# Patient Record
Sex: FEMALE | Race: WHITE | NOT HISPANIC OR LATINO | Employment: OTHER | URBAN - METROPOLITAN AREA
[De-identification: names, ages, dates, MRNs, and addresses within clinical notes are randomized per-mention and may not be internally consistent; named-entity substitution may affect disease eponyms.]

---

## 2017-04-22 DIAGNOSIS — I10 BENIGN ESSENTIAL HTN: ICD-10-CM

## 2017-04-25 RX ORDER — ASPIRIN 81 MG
TABLET,CHEWABLE ORAL
Qty: 90 TAB | Refills: 3 | Status: ON HOLD | OUTPATIENT
Start: 2017-04-25 | End: 2017-11-30

## 2017-06-15 ENCOUNTER — APPOINTMENT (OUTPATIENT)
Dept: RADIOLOGY | Facility: MEDICAL CENTER | Age: 67
End: 2017-06-15
Attending: EMERGENCY MEDICINE
Payer: MEDICAID

## 2017-06-15 ENCOUNTER — HOSPITAL ENCOUNTER (EMERGENCY)
Facility: MEDICAL CENTER | Age: 67
End: 2017-06-15
Attending: EMERGENCY MEDICINE
Payer: MEDICAID

## 2017-06-15 VITALS
HEIGHT: 62 IN | DIASTOLIC BLOOD PRESSURE: 63 MMHG | SYSTOLIC BLOOD PRESSURE: 131 MMHG | RESPIRATION RATE: 15 BRPM | BODY MASS INDEX: 30.36 KG/M2 | HEART RATE: 61 BPM | TEMPERATURE: 98.8 F | WEIGHT: 165 LBS | OXYGEN SATURATION: 94 %

## 2017-06-15 DIAGNOSIS — R07.89 ATYPICAL CHEST PAIN: ICD-10-CM

## 2017-06-15 LAB
ALBUMIN SERPL BCP-MCNC: 4.1 G/DL (ref 3.2–4.9)
ALBUMIN/GLOB SERPL: 1.1 G/DL
ALP SERPL-CCNC: 66 U/L (ref 30–99)
ALT SERPL-CCNC: 18 U/L (ref 2–50)
ANION GAP SERPL CALC-SCNC: 7 MMOL/L (ref 0–11.9)
APTT PPP: 28.9 SEC (ref 24.7–36)
AST SERPL-CCNC: 27 U/L (ref 12–45)
BASOPHILS # BLD AUTO: 0.9 % (ref 0–1.8)
BASOPHILS # BLD: 0.09 K/UL (ref 0–0.12)
BILIRUB SERPL-MCNC: 0.6 MG/DL (ref 0.1–1.5)
BNP SERPL-MCNC: 98 PG/ML (ref 0–100)
BUN SERPL-MCNC: 13 MG/DL (ref 8–22)
CALCIUM SERPL-MCNC: 9.3 MG/DL (ref 8.5–10.5)
CHLORIDE SERPL-SCNC: 106 MMOL/L (ref 96–112)
CO2 SERPL-SCNC: 21 MMOL/L (ref 20–33)
CREAT SERPL-MCNC: 0.86 MG/DL (ref 0.5–1.4)
EOSINOPHIL # BLD AUTO: 0.54 K/UL (ref 0–0.51)
EOSINOPHIL NFR BLD: 5.3 % (ref 0–6.9)
ERYTHROCYTE [DISTWIDTH] IN BLOOD BY AUTOMATED COUNT: 42.5 FL (ref 35.9–50)
GFR SERPL CREATININE-BSD FRML MDRD: >60 ML/MIN/1.73 M 2
GLOBULIN SER CALC-MCNC: 3.9 G/DL (ref 1.9–3.5)
GLUCOSE SERPL-MCNC: 105 MG/DL (ref 65–99)
HCT VFR BLD AUTO: 44.8 % (ref 37–47)
HGB BLD-MCNC: 15.4 G/DL (ref 12–16)
IMM GRANULOCYTES # BLD AUTO: 0.02 K/UL (ref 0–0.11)
IMM GRANULOCYTES NFR BLD AUTO: 0.2 % (ref 0–0.9)
INR PPP: 1.06 (ref 0.87–1.13)
LIPASE SERPL-CCNC: 37 U/L (ref 11–82)
LYMPHOCYTES # BLD AUTO: 3.86 K/UL (ref 1–4.8)
LYMPHOCYTES NFR BLD: 38.2 % (ref 22–41)
MCH RBC QN AUTO: 29.8 PG (ref 27–33)
MCHC RBC AUTO-ENTMCNC: 34.4 G/DL (ref 33.6–35)
MCV RBC AUTO: 86.8 FL (ref 81.4–97.8)
MONOCYTES # BLD AUTO: 0.81 K/UL (ref 0–0.85)
MONOCYTES NFR BLD AUTO: 8 % (ref 0–13.4)
NEUTROPHILS # BLD AUTO: 4.78 K/UL (ref 2–7.15)
NEUTROPHILS NFR BLD: 47.4 % (ref 44–72)
NRBC # BLD AUTO: 0 K/UL
NRBC BLD AUTO-RTO: 0 /100 WBC
PLATELET # BLD AUTO: 290 K/UL (ref 164–446)
PMV BLD AUTO: 9.7 FL (ref 9–12.9)
POTASSIUM SERPL-SCNC: 4 MMOL/L (ref 3.6–5.5)
PROT SERPL-MCNC: 8 G/DL (ref 6–8.2)
PROTHROMBIN TIME: 14.1 SEC (ref 12–14.6)
RBC # BLD AUTO: 5.16 M/UL (ref 4.2–5.4)
SODIUM SERPL-SCNC: 134 MMOL/L (ref 135–145)
TROPONIN I SERPL-MCNC: <0.01 NG/ML (ref 0–0.04)
TROPONIN I SERPL-MCNC: <0.01 NG/ML (ref 0–0.04)
WBC # BLD AUTO: 10.1 K/UL (ref 4.8–10.8)

## 2017-06-15 PROCEDURE — 85730 THROMBOPLASTIN TIME PARTIAL: CPT

## 2017-06-15 PROCEDURE — 93005 ELECTROCARDIOGRAM TRACING: CPT | Performed by: EMERGENCY MEDICINE

## 2017-06-15 PROCEDURE — 71010 DX-CHEST-PORTABLE (1 VIEW): CPT

## 2017-06-15 PROCEDURE — 84484 ASSAY OF TROPONIN QUANT: CPT

## 2017-06-15 PROCEDURE — 83690 ASSAY OF LIPASE: CPT

## 2017-06-15 PROCEDURE — 85025 COMPLETE CBC W/AUTO DIFF WBC: CPT

## 2017-06-15 PROCEDURE — 85610 PROTHROMBIN TIME: CPT

## 2017-06-15 PROCEDURE — A9270 NON-COVERED ITEM OR SERVICE: HCPCS | Performed by: EMERGENCY MEDICINE

## 2017-06-15 PROCEDURE — 700102 HCHG RX REV CODE 250 W/ 637 OVERRIDE(OP): Performed by: EMERGENCY MEDICINE

## 2017-06-15 PROCEDURE — 99284 EMERGENCY DEPT VISIT MOD MDM: CPT

## 2017-06-15 PROCEDURE — 83880 ASSAY OF NATRIURETIC PEPTIDE: CPT

## 2017-06-15 PROCEDURE — 80053 COMPREHEN METABOLIC PANEL: CPT

## 2017-06-15 RX ORDER — ACETAMINOPHEN 325 MG/1
650 TABLET ORAL ONCE
Status: COMPLETED | OUTPATIENT
Start: 2017-06-15 | End: 2017-06-15

## 2017-06-15 RX ORDER — SUCRALFATE 1 G/1
1 TABLET ORAL
Qty: 120 TAB | Refills: 3 | Status: SHIPPED | OUTPATIENT
Start: 2017-06-15 | End: 2017-11-22

## 2017-06-15 RX ADMIN — ACETAMINOPHEN 650 MG: 325 TABLET, FILM COATED ORAL at 17:44

## 2017-06-15 RX ADMIN — LIDOCAINE HYDROCHLORIDE 30 ML: 20 SOLUTION OROPHARYNGEAL at 15:41

## 2017-06-15 ASSESSMENT — LIFESTYLE VARIABLES: DO YOU DRINK ALCOHOL: NO

## 2017-06-15 NOTE — ED AVS SNAPSHOT
6/15/2017    Karen Dimas  [address]  [place] [address]    Dear Karen:    [facility] wants to ensure your discharge home is safe and you or your loved ones have had all of your questions answered regarding your care after you leave the hospital.    Below is a list of resources and contact information should you have any questions regarding your hospital stay, follow-up instructions, or active medical symptoms.    Questions or Concerns Regarding… Contact   Medical Questions Related to Your Discharge  (7 days a week, 8am-5pm) Contact a Nurse Care Coordinator   [phone]   Medical Questions Not Related to Your Discharge  (24 hours a day / 7 days a week)  Contact the Nurse Health Line   [phone]    Medications or Discharge Instructions Refer to your discharge packet   or contact your [facility] Primary Care Provider   [phone]   Follow-up Appointment(s) Schedule your appointment via [employer]   or contact Scheduling [phone]   Billing Review your statement via [employer]  or contact Billing [phone]   Medical Records Review your records via [employer]   or contact Medical Records [phone]     You may receive a telephone call within two days of discharge. This call is to make certain you understand your discharge instructions and have the opportunity to have any questions answered. You can also easily access your medical information, test results and upcoming appointments via the [employer] free online health management tool. You can learn more and sign up at Recommend/Appbyme. For assistance setting up your [employer] account, please call [phone].    Once again, we want to ensure your discharge home is safe and that you have a clear understanding of any next steps in your care. If you have any questions or concerns, please do not hesitate to contact us, we are here for you. Thank you for choosing [facility] for your healthcare needs.    Sincerely,    Your [facility] Healthcare Team

## 2017-06-15 NOTE — ED AVS SNAPSHOT
Home Care Instructions                                                                                                                Karen Dimas   MRN: [id number]    Department:  [facility], Emergency Dept   Date of Visit:  6/15/2017            [facility], Emergency Dept    [address] [facility] [address]    Phone:  [phone]      You were seen by     Janeth Salazar M.D.      Your Diagnosis Was     Atypical chest pain     R07.89       These are the medications you received during your hospitalization from 06/15/2017 1446 to 06/15/2017 1819     Date/Time Order Dose Route Action    06/15/2017 1541 hyoscyamine-maalox plus-lidocaine viscous (GI COCKTAIL) oral susp 30 mL 30 mL Oral Given    06/15/2017 1744 acetaminophen (TYLENOL) tablet 650 mg 650 mg Oral Given      Follow-up Information     1. Schedule an appointment as soon as possible for a visit with Patricio Estrella M.D..    Specialty:  Cardiology    Contact information    [address]  [address]  [facility] [address] [phone]          2. Schedule an appointment as soon as possible for a visit with IAN Oliva.    Specialty:  Family Medicine    Contact information    [address]  [facility] [address] [phone]        Medication Information     Review all of your home medications and newly ordered medications with your primary doctor and/or pharmacist as soon as possible. Follow medication instructions as directed by your doctor and/or pharmacist.     Please keep your complete medication list with you and share with your physician. Update the information when medications are discontinued, doses are changed, or new medications (including over-the-counter products) are added; and carry medication information at all times in the event of emergency situations.               Medication List      START taking these medications        Instructions    Morning Afternoon Evening  Bedtime    sucralfate 1 GM Tabs   Commonly known as:  CARAFATE        Take 1 Tab by mouth 4 Times a Day,Before Meals and at Bedtime.   Dose:  1 g                          ASK your doctor about these medications        Instructions    Morning Afternoon Evening Bedtime    alendronate 70 MG Tabs   Commonly known as:  FOSAMAX        Take 70 mg by mouth every 7 days. Every Friday night   Dose:  70 mg                        * aspirin 81 MG tablet        Take 81 mg by mouth every day.   Dose:  81 mg                        * ASPIRIN LOW DOSE 81 MG Chew chewable tablet   Generic drug:  aspirin        CHEW AND SWALLOW ONE TAB ORALLY EVERY DAY                        atorvastatin 20 MG Tabs   Commonly known as:  LIPITOR        Take 20 mg by mouth every evening.   Dose:  20 mg                        capsaicin 0.025 % cream   Commonly known as:  ZOSTRIX        Doctor's comments:  Apply to painful area as needed.   Apply 1 Application to affected area(s) 3 times a day.   Dose:  1 Application                        enalapril 10 MG Tabs   Commonly known as:  VASOTEC        Take 10 mg by mouth every day.   Dose:  10 mg                        gabapentin 100 MG Caps   Commonly known as:  NEURONTIN        Take 100 mg by mouth 3 times a day.   Dose:  100 mg                        hydrochlorothiazide 12.5 MG tablet   Commonly known as:  HYDRODIURIL        Take 12.5 mg by mouth every day.   Dose:  12.5 mg                        hydrOXYzine 25 MG Tabs   Commonly known as:  ATARAX        Take 25 mg by mouth 2 Times a Day.   Dose:  25 mg                        metoprolol SR 25 MG Tb24   Commonly known as:  TOPROL XL        Take 25 mg by mouth every day.   Dose:  25 mg                        omeprazole 20 MG delayed-release capsule   Commonly known as:  PRILOSEC        Take 20 mg by mouth every day.   Dose:  20 mg                        trazodone 50 MG Tabs   Commonly known as:  DESYREL        Take 150-200 mg by mouth at bedtime as needed for  Sleep.   Dose:  150-200 mg                        vitamin D (Ergocalciferol) 21229 UNITS Caps capsule   Commonly known as:  DRISDOL        Take 50,000 Units by mouth every 7 days. Every Friday night   Dose:  62647 Units                        * Notice:  This list has 2 medication(s) that are the same as other medications prescribed for you. Read the directions carefully, and ask your doctor or other care provider to review them with you.         Where to Get Your Medications      You can get these medications from any pharmacy     Bring a paper prescription for each of these medications    - sucralfate 1 GM Tabs            Procedures and tests performed during your visit     APTT    Btype Natriuretic Peptide (BNP)    CBC with Differential    Complete Metabolic Panel (CMP)    DX-CHEST-PORTABLE (1 VIEW)    EKG (ER)    ESTIMATED GFR    IV Saline Lock    Lipase    Prothrombin Time (PT/INR)    TROPONIN    Troponin STAT        Discharge Instructions       Hiatal Hernia  A hiatal hernia occurs when part of your stomach slides above the muscle that separates your abdomen from your chest (diaphragm). You can be born with a hiatal hernia (congenital), or it may develop over time. In almost all cases of hiatal hernia, only the top part of the stomach pushes through.   Many people have a hiatal hernia with no symptoms. The larger the hernia, the more likely that you will have symptoms. In some cases, a hiatal hernia allows stomach acid to flow back into the tube that carries food from your mouth to your stomach (esophagus). This may cause heartburn symptoms. Severe heartburn symptoms may mean you have developed a condition called gastroesophageal reflux disease (GERD).   CAUSES   Hiatal hernias are caused by a weakness in the opening (hiatus) where your esophagus passes through your diaphragm to attach to the upper part of your stomach. You may be born with a weakness in your hiatus, or a weakness can develop.  RISK  FACTORS  Older age is a major risk factor for a hiatal hernia. Anything that increases pressure on your diaphragm can also increase your risk of a hiatal hernia. This includes:  · Pregnancy.  · Excess weight.  · Frequent constipation.  SIGNS AND SYMPTOMS   People with a hiatal hernia often have no symptoms. If symptoms develop, they are almost always caused by GERD. They may include:  · Heartburn.  · Belching.  · Indigestion.  · Trouble swallowing.  · Coughing or wheezing.   · Sore throat.  · Hoarseness.  · Chest pain.  DIAGNOSIS   A hiatal hernia is sometimes found during an exam for another problem. Your health care provider may suspect a hiatal hernia if you have symptoms of GERD. Tests may be done to diagnose GERD. These may include:  · X-rays of your stomach or chest.  · An upper gastrointestinal (GI) series. This is an X-ray exam of your GI tract involving the use of a chalky liquid that you swallow. The liquid shows up clearly on the X-ray.  · Endoscopy. This is a procedure to look into your stomach using a thin, flexible tube that has a tiny camera and light on the end of it.  TREATMENT   If you have no symptoms, you may not need treatment. If you have symptoms, treatment may include:  · Dietary and lifestyle changes to help reduce GERD symptoms.  · Medicines. These may include:  ¨ Over-the-counter antacids.  ¨ Medicines that make your stomach empty more quickly.  ¨ Medicines that block the production of stomach acid (H2 blockers).  ¨ Stronger medicines to reduce stomach acid (proton pump inhibitors).  · You may need surgery to repair the hernia if other treatments are not helping.  HOME CARE INSTRUCTIONS   · Take all medicines as directed by your health care provider.  · Quit smoking, if you smoke.  · Try to achieve and maintain a healthy body weight.  · Eat frequent small meals instead of three large meals a day. This keeps your stomach from getting too full.  ¨ Eat slowly.  ¨ Do not lie down right after  eating.   ¨ Do not eat 1-2 hours before bed.    · Do not drink beverages with caffeine. These include cola, coffee, cocoa, and tea.  · Do not drink alcohol.  · Avoid foods that can make symptoms of GERD worse. These may include:  ¨ Fatty foods.  ¨ Citrus fruits.  ¨ Other foods and drinks that contain acid.  · Avoid putting pressure on your belly. Anything that puts pressure on your belly increases the amount of acid that may be pushed up into your esophagus.    ¨ Avoid bending over, especially after eating.  ¨ Raise the head of your bed by putting blocks under the legs. This keeps your head and esophagus higher than your stomach.  ¨ Do not wear tight clothing around your chest or stomach.  ¨ Try not to strain when having a bowel movement, when urinating, or when lifting heavy objects.  SEEK MEDICAL CARE IF:  · Your symptoms are not controlled with medicines or lifestyle changes.  · You are having trouble swallowing.  · You have coughing or wheezing that will not go away.  SEEK IMMEDIATE MEDICAL CARE IF:  · Your pain is getting worse.  · Your pain spreads to your arms, neck, jaw, teeth, or back.  · You have shortness of breath.  · You sweat for no reason.  · You feel sick to your stomach (nauseous) or vomit.  · You vomit blood.  · You have bright red blood in your stools.  · You have black, tarry stools.       This information is not intended to replace advice given to you by your health care provider. Make sure you discuss any questions you have with your health care provider.     Document Released: 03/09/2005 Document Revised: 01/08/2016 Document Reviewed: 12/05/2014  Elsevier Interactive Patient Education ©2016 [employer]            Patient Information     Patient Information    Following emergency treatment: all patient requiring follow-up care must return either to a private physician or a clinic if your condition worsens before you are able to obtain further medical attention, please return to the emergency  room.     Billing Information    [facility] we work to make the billing process streamlined for our patients.  Our Representatives are here to answer any questions you may have regarding your hospital bill.  If you have insurance coverage and have supplied your insurance information to us, we will submit a claim to your insurer on your behalf.  Should you have any questions regarding your bill, we can be reached online or by phone as follows:  Online: You are able pay your bills online or live chat with our representatives about any billing questions you may have. We are here to help Monday - Friday from 8:00am to 7:30pm and 9:00am - 12:00pm on Saturdays.  Please visit [email or web address]  for more information.   Phone:  [phone] or [phone]    Please note that your emergency physician, surgeon, pathologist, radiologist, anesthesiologist, and other specialists are not employed by [facility] and will therefore bill separately for their services.  Please contact them directly for any questions concerning their bills at the numbers below:     Emergency Physician Services:  [phone]  [facility]:  [phone]  [facility]:  [phone]  [facility]:  [phone]    1. Your final bill may vary from the amount quoted upon discharge if all procedures are not complete at that time, or if your doctor has additional procedures of which we are not aware. You will receive an additional bill if you return to the [facility] at [facility] for [facility] removal regardless of the facility of which the sutures were placed.     2. Please arrange for settlement of this account at the emergency registration.    3. All self-pay accounts are due in full at the time of treatment.  If you are unable to meet this obligation then payment is expected within 4-5 days.     4. If you have had radiology studies (CT, X-ray, Ultrasound,  MRI), you have received a preliminary result during your emergency department visit. Please contact the radiology department [phone] to receive a copy of your final result. Please discuss the Final result with your primary physician or with the follow up physician provided.     Crisis Hotline:  [place] Crisis Hotline:  9-466-MWNDYJF or 1-785.999.8227  [place] Crisis Hotline:    1-209.976.3391 or 149-933-3322         ED Discharge Follow Up Questions    1. In order to provide you with very good care, we would like to follow up with a phone call in the next few days.  May we have your permission to contact you?     YES /  NO    2. What is the best phone number to call you? (       )_____-__________    3. What is the best time to call you?      Morning  /  Afternoon  /  Evening                   Patient Signature:  ____________________________________________________________    Date:  ____________________________________________________________

## 2017-06-15 NOTE — ED AVS SNAPSHOT
[employer] Access Code: [email or web address]  Expires: 7/15/2017  6:19 PM    Your email address is not on file at [facility].  Email Addresses are required for you to sign up for [employer], please contact [phone] to verify your personal information and to provide your email address prior to attempting to register for [employer].    Karen Dimas  [address] [place] [address], [place] [address]    [employer]  A secure, online tool to manage your health information     [facility]’s [employer]® is a secure, online tool that connects you to your personalized health information from the privacy of your home -- day or night - making it very easy for you to manage your healthcare. Once the activation process is completed, you can even access your medical information using the [employer] lennie, which is available for free in the Apple Lennie store or Google Play store.     To learn more about [employer], visit [email or web address]    There are two levels of access available (as shown below):   My Chart Features  [facility] Primary Care Doctor [facility]  Specialists [facility]  Urgent  Care Non-[facility] Primary Care Doctor   Email your healthcare team securely and privately 24/7 X X X    Manage appointments: schedule your next appointment; view details of past/upcoming appointments X      Request prescription refills. X      View recent personal medical records, including lab and immunizations X X X X   View health record, including health history, allergies, medications X X X X   Read reports about your outpatient visits, procedures, consult and ER notes X X X X   See your discharge summary, which is a recap of your hospital and/or ER visit that includes your diagnosis, lab results, and care plan X X  X     How to register for [employer]:  Once your e-mail address has been verified, follow the following steps to sign up for [employer].     1. Go to  [email or web address]  2. Click on the Sign Up Now box, which takes you to the New Member Sign Up page. You will  need to provide the following information:  a. Enter your [employer] Access Code exactly as it appears at the top of this page. (You will not need to use this code after you’ve completed the sign-up process. If you do not sign up before the expiration date, you must request a new code.)   b. Enter your date of birth.   c. Enter your home email address.   d. Click Submit, and follow the next screen’s instructions.  3. Create a [employer] ID. This will be your [employer] login ID and cannot be changed, so think of one that is secure and easy to remember.  4. Create a [employer] password. You can change your password at any time.  5. Enter your Password Reset Question and Answer. This can be used at a later time if you forget your password.   6. Enter your e-mail address. This allows you to receive e-mail notifications when new information is available in [employer].  7. Click Sign Up. You can now view your health information.    For assistance activating your [employer] account, call [phone]

## 2017-06-15 NOTE — ED NOTES
Pt began feeling chest pain and epigastric pain at 1300. Patient has HX of hiatal hernia, HX of MI, increased stress. Patient was given nitro which did not help to resolve pain

## 2017-06-15 NOTE — ED PROVIDER NOTES
CHIEF COMPLAINT  Chest pain.     HPI  Karen Dimas is a 66 y.o. female who presents to the Emergency Department with chest pain.  It is located anterior chest and feels like sharp pains and has an intensity of 3 with no radiation to the jaw or back.   There is no shortness of breath, there, no nausea, no abdominal pain, yes back pain which is chronic, no jaw pain, no diaphoresis, .C     CAD Risk factor review:  History of CAD which occurred in  and was found incidentally on testing that she had a heart attack prior to admission, yes dyslipidemia, no family history, no diabetes, yes smoking for 20- 30 years which she stopped 2.5 years ago, moderate obesity, yes hypertension, no cocaine or methamphetamines.    Pulmonary Embolist risk factor review:  no recent surgery, no history of DVT or PE, no hemoptysis, no unilateral leg swelling, no malignancy, no BCP or hormone therapy.    History of hiatal hernia. Patient was supposed to have an EKG performed 3 days ago, but was not able to as she does not have a . She confirms diarrhea secondary to colitis and sour taste in mouth, which she takes antacids for. She denies vomiting with or without blood and blood in stool..     Her pain began 2 hours prior to arrival. She has a history of myocardial infarction, hiatal hernia and appendectomy. She reports her   May 17th 2017. Tired nitroglycerin which did not help    REVIEW OF SYSTEMS   As above all other systems are negative.C.     PAST MEDICAL HISTORY   has a past medical history of Hypertension and Stress-induced cardiomyopathy (2014).    FAMILY HISTORY  None noted    SOCIAL HISTORY  Social History     Social History Main Topics   • Smoking status: Former Smoker -- 0.15 packs/day     Quit date: 2015   • Smokeless tobacco: Not on file   • Alcohol Use: Yes      Comment: once every 6 months   • Drug Use: Yes     Special: Marijuana   • Sexual Activity: Not on file       SURGICAL HISTORY    "has past surgical history that includes gastroscopy with biopsy (9/28/2014) and colonoscopy with biopsy (9/28/2014).    CURRENT MEDICATIONS  Reviewed.  See Encounter Summary.  Include No current facility-administered medications for this encounter.    Current outpatient prescriptions:   •  sucralfate (CARAFATE) 1 GM Tab, Take 1 Tab by mouth 4 Times a Day,Before Meals and at Bedtime., Disp: 120 Tab, Rfl: 3  •  ASPIRIN LOW DOSE 81 MG Chew Tab chewable tablet, CHEW AND SWALLOW ONE TAB ORALLY EVERY DAY, Disp: 90 Tab, Rfl: 3  •  capsaicin (ZOSTRIX) 0.025 % cream, Apply 1 Application to affected area(s) 3 times a day., Disp: 1 Tube, Rfl: 0  •  atorvastatin (LIPITOR) 20 MG Tab, Take 20 mg by mouth every evening., Disp: , Rfl:   •  aspirin 81 MG tablet, Take 81 mg by mouth every day., Disp: , Rfl:   •  enalapril (VASOTEC) 10 MG Tab, Take 10 mg by mouth every day., Disp: , Rfl:   •  gabapentin (NEURONTIN) 100 MG Cap, Take 100 mg by mouth 3 times a day., Disp: , Rfl:   •  hydrochlorothiazide (HYDRODIURIL) 12.5 MG tablet, Take 12.5 mg by mouth every day., Disp: , Rfl:   •  hydrOXYzine (ATARAX) 25 MG Tab, Take 25 mg by mouth 2 Times a Day., Disp: , Rfl:   •  metoprolol SR (TOPROL XL) 25 MG TABLET SR 24 HR, Take 25 mg by mouth every day., Disp: , Rfl:   •  omeprazole (PRILOSEC) 20 MG delayed-release capsule, Take 20 mg by mouth every day., Disp: , Rfl:   •  vitamin D, Ergocalciferol, (DRISDOL) 21554 UNITS Cap capsule, Take 50,000 Units by mouth every 7 days. Every Friday night, Disp: , Rfl:   •  alendronate (FOSAMAX) 70 MG Tab, Take 70 mg by mouth every 7 days. Every Friday night, Disp: , Rfl:   •  trazodone (DESYREL) 50 MG Tab, Take 150-200 mg by mouth at bedtime as needed for Sleep., Disp: , Rfl:       ALLERGIES  Allergies   Allergen Reactions   • Codeine Vomiting       PHYSICAL EXAM  VITAL SIGNS: /63 mmHg  Pulse 58  Temp(Src) 37.1 °C (98.8 °F)  Resp 15  Ht 1.575 m (5' 2\")  Wt 74.844 kg (165 lb)  BMI 30.17 kg/m2  " SpO2 96%  Constitutional:  Alert , able to answer questions  HENT: Nose is normal in appearance, external ears are normal,  moist mucous membranes  Eyes: Anicteric,  pupils are equal round and reactive, there is no conjunctival drainage or pallor   Neck: The trachea is midline, there is no obvious mass or meningeal signs  Cardiovascular: Good perfusion,  regular rate and rhythm without murmurs gallops or rubs  Thorax & Lungs: Respiratory rate and effort are normal. There is normal chest excursion with respiration.  No wheezes rhonchi or rales noted.  Abdomen: Abdomen is normal in appearance, no gross peritoneal signs , normal bowel sounds, no pain with cough  :   No CVA tenderness to palpation  Musculoskeletal: No deformities noted in all 4 extremities.   Skin: Visualized skin is warm without rash.  Neurologic:  Cranial nerves II through XII are intact there is no focal abnormality noted.  Psychiatric: Normal mood and mentation    RADIOLOGY/PROCEDURES  Imaging Studies:    DX-CHEST-PORTABLE (1 VIEW)   Final Result      No consolidation.            Pertinent Labs   Results for orders placed or performed during the hospital encounter of 06/15/17   CBC with Differential   Result Value Ref Range    WBC 10.1 4.8 - 10.8 K/uL    RBC 5.16 4.20 - 5.40 M/uL    Hemoglobin 15.4 12.0 - 16.0 g/dL    Hematocrit 44.8 37.0 - 47.0 %    MCV 86.8 81.4 - 97.8 fL    MCH 29.8 27.0 - 33.0 pg    MCHC 34.4 33.6 - 35.0 g/dL    RDW 42.5 35.9 - 50.0 fL    Platelet Count 290 164 - 446 K/uL    MPV 9.7 9.0 - 12.9 fL    Neutrophils-Polys 47.40 44.00 - 72.00 %    Lymphocytes 38.20 22.00 - 41.00 %    Monocytes 8.00 0.00 - 13.40 %    Eosinophils 5.30 0.00 - 6.90 %    Basophils 0.90 0.00 - 1.80 %    Immature Granulocytes 0.20 0.00 - 0.90 %    Nucleated RBC 0.00 /100 WBC    Neutrophils (Absolute) 4.78 2.00 - 7.15 K/uL    Lymphs (Absolute) 3.86 1.00 - 4.80 K/uL    Monos (Absolute) 0.81 0.00 - 0.85 K/uL    Eos (Absolute) 0.54 (H) 0.00 - 0.51 K/uL    Hui  (Absolute) 0.09 0.00 - 0.12 K/uL    Immature Granulocytes (abs) 0.02 0.00 - 0.11 K/uL    NRBC (Absolute) 0.00 K/uL   Complete Metabolic Panel (CMP)   Result Value Ref Range    Sodium 134 (L) 135 - 145 mmol/L    Potassium 4.0 3.6 - 5.5 mmol/L    Chloride 106 96 - 112 mmol/L    Co2 21 20 - 33 mmol/L    Anion Gap 7.0 0.0 - 11.9    Glucose 105 (H) 65 - 99 mg/dL    Bun 13 8 - 22 mg/dL    Creatinine 0.86 0.50 - 1.40 mg/dL    Calcium 9.3 8.5 - 10.5 mg/dL    AST(SGOT) 27 12 - 45 U/L    ALT(SGPT) 18 2 - 50 U/L    Alkaline Phosphatase 66 30 - 99 U/L    Total Bilirubin 0.6 0.1 - 1.5 mg/dL    Albumin 4.1 3.2 - 4.9 g/dL    Total Protein 8.0 6.0 - 8.2 g/dL    Globulin 3.9 (H) 1.9 - 3.5 g/dL    A-G Ratio 1.1 g/dL   Btype Natriuretic Peptide (BNP)   Result Value Ref Range    B Natriuretic Peptide 98 0 - 100 pg/mL   Prothrombin Time (PT/INR)   Result Value Ref Range    PT 14.1 12.0 - 14.6 sec    INR 1.06 0.87 - 1.13   APTT   Result Value Ref Range    APTT 28.9 24.7 - 36.0 sec   Lipase   Result Value Ref Range    Lipase 37 11 - 82 U/L   Troponin STAT   Result Value Ref Range    Troponin I <0.01 0.00 - 0.04 ng/mL   ESTIMATED GFR   Result Value Ref Range    GFR If African American >60 >60 mL/min/1.73 m 2    GFR If Non African American >60 >60 mL/min/1.73 m 2   TROPONIN   Result Value Ref Range    Troponin I <0.01 0.00 - 0.04 ng/mL   EKG (ER)   Result Value Ref Range    Report       [facility]    Test Date:  2017-06-15  Pt Name:    DONAL BOOKER               Department: ER  MRN:        [id number]                      Room:       RD 10  Gender:     F                            Technician: GARY  :        1950                   Requested By:OLLIE JI  Order #:    [id number]                    Reading MD:    Measurements  Intervals                                Axis  Rate:       60                           P:          47  IL:         188                          QRS:        2  QRSD:        76                           T:          25  QT:         456  QTc:        456    Interpretive Statements  SINUS RHYTHM  Compared to ECG 12/19/2016 05:58:30  No significant changes         I interpreted this EKG myself.  This is a 12-lead study.  The rhythm is sinus with a rate of 60.  There are no ST segment nor T wave abnormalities.  Interpretation: No ST segment elevation myocardial infarction.      COURSE & MEDICAL DECISION MAKING  Nursing notes and vital signs were reviewed. (See chart for details)  The patients nursing records were reviewed, history was obtained from the patient and she is followed by cardiology for stress induced cardiomyopathy. Last Myocardial Perfusion 6 months ago and normal     Report:   NUCLEAR IMAGING INTERPRETATION   No evidence of significant jeopardized viable myocardium or prior myocardial    infarction.   Normal left ventricular size, ejection fraction, and wall motion.   ECG INTERPRETATION   Negative stress ECG for ischemia.    The patient presents with chest pain, and the differential diagnosis includes but is not limited to  acute coronary syndrome, anxiety disorder, aortic dissection, esophageal rupture or spasm, gastroesophageal reflux disease, musculoskeletal chest pain, acute myocardial infarction, peptic ulcer disease, pericarditis, pneumothorax, or pulmonary embolism.   I am most concerned about probable ulcer as the patient has     Initial orders in the Emergency Department included DX chest, CBC, CMP, troponin BNP, PCXR, and initial treatment in the Emergency Department included GI cocktail 30 mL and the patient received IV  Saline lock. Will perform a chest X-ray to evaluate her hernia  And cardiac size     ED testing reveals normal perfusion study 6 months ago, serial cardiac enzymes are negative. The patient's chest pain does not sound exertional or classic for a cardiac etiology. At this time I do not believe that this is cardiac in etiology. She does have Dr. Estrella  I have recommended she follow-up with him for further outpatient management    With regards to her hiatal hernia, she was due for an EGD on Monday which she missed. She may develop an early ulceration and I feel she should be on a proton pump inhibitor and so Griffey prior to meals. She should follow-up with GI as soon as possible and knows to return if she is not controlling the pain with this regimen has blood in her stool or vomit or any worsening symptoms    On repeat evaluation of the patient, there was a  good  response to medications, improvement clinically and patient is comfortable going home  FINAL IMPRESSION  1. Acute Atypical Chest Pain    2. Hiatal Hernia       DISPOSITION  home     Patient has had high blood pressure while in the emergency department, felt likely secondary to medical condition. Counseled patient to monitor blood pressure at home and follow up with primary care physician.       FOLLOW UP:  Patricio Estrella M.D.  [address]  [address]  [facility] [id number] [phone]    Schedule an appointment as soon as possible for a visit      IAN Oliva  [address] [facility] [id number] [phone]    Schedule an appointment as soon as possible for a visit        OUTPATIENT MEDICATIONS:  Discharge Medication List as of 6/15/2017  6:19 PM      START taking these medications    Details   sucralfate (CARAFATE) 1 GM Tab Take 1 Tab by mouth 4 Times a Day,Before Meals and at Bedtime., Disp-120 Tab, R-3, Print Rx Paper               The patient was discharged home with an information sheet on chest pain and told to return immediately for any signs or symptoms listed, but specifically if difficulty breathing or any worsening, or any worsening at all.  The patient verbally agreed to the discharge precautions and follow-up plan which is documented in EPIC.    Electronically signed by: Prem Lerma, 6/15/2017 2:57 PM

## 2017-06-16 NOTE — ED NOTES
Patient verbalize sunderstanding of discharge instructions. Patient to follow up with PCP for further treatmen. Patient ambualtory to discharge with steady gait. NAD or deficitsnoted at time of discharge

## 2017-06-16 NOTE — ED NOTES
Pt states the Gi coctail did not resolve her pain. Patient sleeping on [facility]

## 2017-06-16 NOTE — DISCHARGE INSTRUCTIONS
Hiatal Hernia  A hiatal hernia occurs when part of your stomach slides above the muscle that separates your abdomen from your chest (diaphragm). You can be born with a hiatal hernia (congenital), or it may develop over time. In almost all cases of hiatal hernia, only the top part of the stomach pushes through.   Many people have a hiatal hernia with no symptoms. The larger the hernia, the more likely that you will have symptoms. In some cases, a hiatal hernia allows stomach acid to flow back into the tube that carries food from your mouth to your stomach (esophagus). This may cause heartburn symptoms. Severe heartburn symptoms may mean you have developed a condition called gastroesophageal reflux disease (GERD).   CAUSES   Hiatal hernias are caused by a weakness in the opening (hiatus) where your esophagus passes through your diaphragm to attach to the upper part of your stomach. You may be born with a weakness in your hiatus, or a weakness can develop.  RISK FACTORS  Older age is a major risk factor for a hiatal hernia. Anything that increases pressure on your diaphragm can also increase your risk of a hiatal hernia. This includes:  · Pregnancy.  · Excess weight.  · Frequent constipation.  SIGNS AND SYMPTOMS   People with a hiatal hernia often have no symptoms. If symptoms develop, they are almost always caused by GERD. They may include:  · Heartburn.  · Belching.  · Indigestion.  · Trouble swallowing.  · Coughing or wheezing.   · Sore throat.  · Hoarseness.  · Chest pain.  DIAGNOSIS   A hiatal hernia is sometimes found during an exam for another problem. Your health care provider may suspect a hiatal hernia if you have symptoms of GERD. Tests may be done to diagnose GERD. These may include:  · X-rays of your stomach or chest.  · An upper gastrointestinal (GI) series. This is an X-ray exam of your GI tract involving the use of a chalky liquid that you swallow. The liquid shows up clearly on the X-ray.  · Endoscopy.  This is a procedure to look into your stomach using a thin, flexible tube that has a tiny camera and light on the end of it.  TREATMENT   If you have no symptoms, you may not need treatment. If you have symptoms, treatment may include:  · Dietary and lifestyle changes to help reduce GERD symptoms.  · Medicines. These may include:  ¨ Over-the-counter antacids.  ¨ Medicines that make your stomach empty more quickly.  ¨ Medicines that block the production of stomach acid (H2 blockers).  ¨ Stronger medicines to reduce stomach acid (proton pump inhibitors).  · You may need surgery to repair the hernia if other treatments are not helping.  HOME CARE INSTRUCTIONS   · Take all medicines as directed by your health care provider.  · Quit smoking, if you smoke.  · Try to achieve and maintain a healthy body weight.  · Eat frequent small meals instead of three large meals a day. This keeps your stomach from getting too full.  ¨ Eat slowly.  ¨ Do not lie down right after eating.   ¨ Do not eat 1-2 hours before bed.    · Do not drink beverages with caffeine. These include cola, coffee, cocoa, and tea.  · Do not drink alcohol.  · Avoid foods that can make symptoms of GERD worse. These may include:  ¨ Fatty foods.  ¨ Citrus fruits.  ¨ Other foods and drinks that contain acid.  · Avoid putting pressure on your belly. Anything that puts pressure on your belly increases the amount of acid that may be pushed up into your esophagus.    ¨ Avoid bending over, especially after eating.  ¨ Raise the head of your bed by putting blocks under the legs. This keeps your head and esophagus higher than your stomach.  ¨ Do not wear tight clothing around your chest or stomach.  ¨ Try not to strain when having a bowel movement, when urinating, or when lifting heavy objects.  SEEK MEDICAL CARE IF:  · Your symptoms are not controlled with medicines or lifestyle changes.  · You are having trouble swallowing.  · You have coughing or wheezing that will not  go away.  SEEK IMMEDIATE MEDICAL CARE IF:  · Your pain is getting worse.  · Your pain spreads to your arms, neck, jaw, teeth, or back.  · You have shortness of breath.  · You sweat for no reason.  · You feel sick to your stomach (nauseous) or vomit.  · You vomit blood.  · You have bright red blood in your stools.  · You have black, tarry stools.       This information is not intended to replace advice given to you by your health care provider. Make sure you discuss any questions you have with your health care provider.     Document Released: 03/09/2005 Document Revised: 01/08/2016 Document Reviewed: 12/05/2014  [employer] Patient Education ©2016 [employer]

## 2017-07-14 LAB — EKG IMPRESSION: NORMAL

## 2017-11-22 ENCOUNTER — HOSPITAL ENCOUNTER (OUTPATIENT)
Dept: RADIOLOGY | Facility: MEDICAL CENTER | Age: 67
DRG: 029 | End: 2017-11-22
Attending: NEUROLOGICAL SURGERY | Admitting: NEUROLOGICAL SURGERY
Payer: OTHER MISCELLANEOUS

## 2017-11-22 DIAGNOSIS — Z01.810 PRE-OPERATIVE CARDIOVASCULAR EXAMINATION: ICD-10-CM

## 2017-11-22 DIAGNOSIS — Z01.812 PRE-OPERATIVE LABORATORY EXAMINATION: ICD-10-CM

## 2017-11-22 DIAGNOSIS — M50.00 INTERVERTEBRAL CERVICAL DISC DISORDER WITH MYELOPATHY, CERVICAL REGION: ICD-10-CM

## 2017-11-22 LAB
ANION GAP SERPL CALC-SCNC: 8 MMOL/L (ref 0–11.9)
APTT PPP: 29.1 SEC (ref 24.7–36)
BASOPHILS # BLD AUTO: 1.1 % (ref 0–1.8)
BASOPHILS # BLD: 0.09 K/UL (ref 0–0.12)
BUN SERPL-MCNC: 12 MG/DL (ref 8–22)
CALCIUM SERPL-MCNC: 9.8 MG/DL (ref 8.5–10.5)
CHLORIDE SERPL-SCNC: 104 MMOL/L (ref 96–112)
CO2 SERPL-SCNC: 24 MMOL/L (ref 20–33)
CREAT SERPL-MCNC: 0.81 MG/DL (ref 0.5–1.4)
EKG IMPRESSION: NORMAL
EOSINOPHIL # BLD AUTO: 0.38 K/UL (ref 0–0.51)
EOSINOPHIL NFR BLD: 4.8 % (ref 0–6.9)
ERYTHROCYTE [DISTWIDTH] IN BLOOD BY AUTOMATED COUNT: 42.6 FL (ref 35.9–50)
GFR SERPL CREATININE-BSD FRML MDRD: >60 ML/MIN/1.73 M 2
GLUCOSE SERPL-MCNC: 100 MG/DL (ref 65–99)
HCT VFR BLD AUTO: 47 % (ref 37–47)
HGB BLD-MCNC: 15.7 G/DL (ref 12–16)
IMM GRANULOCYTES # BLD AUTO: 0.03 K/UL (ref 0–0.11)
IMM GRANULOCYTES NFR BLD AUTO: 0.4 % (ref 0–0.9)
INR PPP: 1.04 (ref 0.87–1.13)
LYMPHOCYTES # BLD AUTO: 2.6 K/UL (ref 1–4.8)
LYMPHOCYTES NFR BLD: 32.5 % (ref 22–41)
MCH RBC QN AUTO: 30.3 PG (ref 27–33)
MCHC RBC AUTO-ENTMCNC: 33.4 G/DL (ref 33.6–35)
MCV RBC AUTO: 90.6 FL (ref 81.4–97.8)
MONOCYTES # BLD AUTO: 0.7 K/UL (ref 0–0.85)
MONOCYTES NFR BLD AUTO: 8.8 % (ref 0–13.4)
NEUTROPHILS # BLD AUTO: 4.2 K/UL (ref 2–7.15)
NEUTROPHILS NFR BLD: 52.4 % (ref 44–72)
NRBC # BLD AUTO: 0 K/UL
NRBC BLD AUTO-RTO: 0 /100 WBC
PLATELET # BLD AUTO: 264 K/UL (ref 164–446)
PMV BLD AUTO: 10.4 FL (ref 9–12.9)
POTASSIUM SERPL-SCNC: 4 MMOL/L (ref 3.6–5.5)
PROTHROMBIN TIME: 13.3 SEC (ref 12–14.6)
RBC # BLD AUTO: 5.19 M/UL (ref 4.2–5.4)
SODIUM SERPL-SCNC: 136 MMOL/L (ref 135–145)
WBC # BLD AUTO: 8 K/UL (ref 4.8–10.8)

## 2017-11-22 PROCEDURE — 85610 PROTHROMBIN TIME: CPT

## 2017-11-22 PROCEDURE — 36415 COLL VENOUS BLD VENIPUNCTURE: CPT

## 2017-11-22 PROCEDURE — 80048 BASIC METABOLIC PNL TOTAL CA: CPT

## 2017-11-22 PROCEDURE — 85730 THROMBOPLASTIN TIME PARTIAL: CPT

## 2017-11-22 PROCEDURE — 93010 ELECTROCARDIOGRAM REPORT: CPT | Performed by: INTERNAL MEDICINE

## 2017-11-22 PROCEDURE — 72050 X-RAY EXAM NECK SPINE 4/5VWS: CPT

## 2017-11-22 PROCEDURE — 85025 COMPLETE CBC W/AUTO DIFF WBC: CPT

## 2017-11-22 PROCEDURE — 71020 DX-CHEST-2 VIEWS: CPT

## 2017-11-22 PROCEDURE — 93005 ELECTROCARDIOGRAM TRACING: CPT

## 2017-11-22 RX ORDER — ENALAPRIL MALEATE 20 MG/1
20 TABLET ORAL EVERY EVENING
COMMUNITY
End: 2021-01-20 | Stop reason: SDUPTHER

## 2017-11-22 RX ORDER — CYCLOBENZAPRINE HCL 5 MG
5-10 TABLET ORAL 3 TIMES DAILY PRN
Status: ON HOLD | COMMUNITY
End: 2017-11-30

## 2017-11-27 ENCOUNTER — APPOINTMENT (OUTPATIENT)
Dept: RADIOLOGY | Facility: MEDICAL CENTER | Age: 67
DRG: 029 | End: 2017-11-27
Attending: NEUROLOGICAL SURGERY
Payer: OTHER MISCELLANEOUS

## 2017-11-27 ENCOUNTER — HOSPITAL ENCOUNTER (INPATIENT)
Facility: MEDICAL CENTER | Age: 67
LOS: 3 days | DRG: 029 | End: 2017-11-30
Attending: NEUROLOGICAL SURGERY | Admitting: NEUROLOGICAL SURGERY
Payer: OTHER MISCELLANEOUS

## 2017-11-27 DIAGNOSIS — M50.00 INTERVERTEBRAL CERVICAL DISC DISORDER WITH MYELOPATHY, CERVICAL REGION: ICD-10-CM

## 2017-11-27 PROCEDURE — 700102 HCHG RX REV CODE 250 W/ 637 OVERRIDE(OP): Performed by: PHYSICIAN ASSISTANT

## 2017-11-27 PROCEDURE — 770001 HCHG ROOM/CARE - MED/SURG/GYN PRIV*

## 2017-11-27 PROCEDURE — 500698 HCHG HEMOCLIP, MEDIUM: Performed by: NEUROLOGICAL SURGERY

## 2017-11-27 PROCEDURE — 500122 HCHG BOVIE, BLADE: Performed by: NEUROLOGICAL SURGERY

## 2017-11-27 PROCEDURE — 700111 HCHG RX REV CODE 636 W/ 250 OVERRIDE (IP): Performed by: PHYSICIAN ASSISTANT

## 2017-11-27 PROCEDURE — 160036 HCHG PACU - EA ADDL 30 MINS PHASE I: Performed by: NEUROLOGICAL SURGERY

## 2017-11-27 PROCEDURE — 4A11X4G MONITORING OF PERIPHERAL NERVOUS ELECTRICAL ACTIVITY, INTRAOPERATIVE, EXTERNAL APPROACH: ICD-10-PCS | Performed by: NEUROLOGICAL SURGERY

## 2017-11-27 PROCEDURE — A9270 NON-COVERED ITEM OR SERVICE: HCPCS | Performed by: NEUROLOGICAL SURGERY

## 2017-11-27 PROCEDURE — 160009 HCHG ANES TIME/MIN: Performed by: NEUROLOGICAL SURGERY

## 2017-11-27 PROCEDURE — A9270 NON-COVERED ITEM OR SERVICE: HCPCS | Performed by: PHYSICIAN ASSISTANT

## 2017-11-27 PROCEDURE — 160048 HCHG OR STATISTICAL LEVEL 1-5: Performed by: NEUROLOGICAL SURGERY

## 2017-11-27 PROCEDURE — 95939 C MOTOR EVOKED UPR&LWR LIMBS: CPT | Performed by: NEUROLOGICAL SURGERY

## 2017-11-27 PROCEDURE — 95940 IONM IN OPERATNG ROOM 15 MIN: CPT | Performed by: NEUROLOGICAL SURGERY

## 2017-11-27 PROCEDURE — 160035 HCHG PACU - 1ST 60 MINS PHASE I: Performed by: NEUROLOGICAL SURGERY

## 2017-11-27 PROCEDURE — 700111 HCHG RX REV CODE 636 W/ 250 OVERRIDE (IP)

## 2017-11-27 PROCEDURE — A4314 CATH W/DRAINAGE 2-WAY LATEX: HCPCS | Performed by: NEUROLOGICAL SURGERY

## 2017-11-27 PROCEDURE — 502000 HCHG MISC OR IMPLANTS RC 0278: Performed by: NEUROLOGICAL SURGERY

## 2017-11-27 PROCEDURE — 700111 HCHG RX REV CODE 636 W/ 250 OVERRIDE (IP): Mod: JW | Performed by: NEUROLOGICAL SURGERY

## 2017-11-27 PROCEDURE — 501838 HCHG SUTURE GENERAL: Performed by: NEUROLOGICAL SURGERY

## 2017-11-27 PROCEDURE — 700112 HCHG RX REV CODE 229: Performed by: PHYSICIAN ASSISTANT

## 2017-11-27 PROCEDURE — 95937 NEUROMUSCULAR JUNCTION TEST: CPT | Performed by: NEUROLOGICAL SURGERY

## 2017-11-27 PROCEDURE — 700105 HCHG RX REV CODE 258: Performed by: PHYSICIAN ASSISTANT

## 2017-11-27 PROCEDURE — 160029 HCHG SURGERY MINUTES - 1ST 30 MINS LEVEL 4: Performed by: NEUROLOGICAL SURGERY

## 2017-11-27 PROCEDURE — 700102 HCHG RX REV CODE 250 W/ 637 OVERRIDE(OP)

## 2017-11-27 PROCEDURE — 160002 HCHG RECOVERY MINUTES (STAT): Performed by: NEUROLOGICAL SURGERY

## 2017-11-27 PROCEDURE — C1713 ANCHOR/SCREW BN/BN,TIS/BN: HCPCS | Performed by: NEUROLOGICAL SURGERY

## 2017-11-27 PROCEDURE — 500700 HCHG HEMOCLIP, SMALL (RED): Performed by: NEUROLOGICAL SURGERY

## 2017-11-27 PROCEDURE — 110454 HCHG SHELL REV 250: Performed by: NEUROLOGICAL SURGERY

## 2017-11-27 PROCEDURE — 95861 NEEDLE EMG 2 EXTREMITIES: CPT | Performed by: NEUROLOGICAL SURGERY

## 2017-11-27 PROCEDURE — 0RG20A0 FUSION OF 2 OR MORE CERVICAL VERTEBRAL JOINTS WITH INTERBODY FUSION DEVICE, ANTERIOR APPROACH, ANTERIOR COLUMN, OPEN APPROACH: ICD-10-PCS | Performed by: NEUROLOGICAL SURGERY

## 2017-11-27 PROCEDURE — 700101 HCHG RX REV CODE 250

## 2017-11-27 PROCEDURE — 500864 HCHG NEEDLE, SPINAL 18G: Performed by: NEUROLOGICAL SURGERY

## 2017-11-27 PROCEDURE — 500444 HCHG HEMOSTAT, SURGICEL 2X3: Performed by: NEUROLOGICAL SURGERY

## 2017-11-27 PROCEDURE — 95925 SOMATOSENSORY TESTING: CPT | Performed by: NEUROLOGICAL SURGERY

## 2017-11-27 PROCEDURE — A9270 NON-COVERED ITEM OR SERVICE: HCPCS

## 2017-11-27 PROCEDURE — 160041 HCHG SURGERY MINUTES - EA ADDL 1 MIN LEVEL 4: Performed by: NEUROLOGICAL SURGERY

## 2017-11-27 PROCEDURE — 700102 HCHG RX REV CODE 250 W/ 637 OVERRIDE(OP): Performed by: NEUROLOGICAL SURGERY

## 2017-11-27 PROCEDURE — 72040 X-RAY EXAM NECK SPINE 2-3 VW: CPT

## 2017-11-27 DEVICE — PLATE ANTERIOR CERVICAL 60MM (1TX1+2TCX1=3): Type: IMPLANTABLE DEVICE | Status: FUNCTIONAL

## 2017-11-27 DEVICE — SCREW 4.0X14 SELF DRILL VAR (1TX12+2TCX12=36): Type: IMPLANTABLE DEVICE | Status: FUNCTIONAL

## 2017-11-27 RX ORDER — DIAZEPAM 5 MG/1
10 TABLET ORAL EVERY 6 HOURS PRN
Status: DISCONTINUED | OUTPATIENT
Start: 2017-11-27 | End: 2017-11-28

## 2017-11-27 RX ORDER — ONDANSETRON 2 MG/ML
4 INJECTION INTRAMUSCULAR; INTRAVENOUS EVERY 4 HOURS PRN
Status: DISCONTINUED | OUTPATIENT
Start: 2017-11-27 | End: 2017-11-30 | Stop reason: HOSPADM

## 2017-11-27 RX ORDER — LABETALOL HYDROCHLORIDE 5 MG/ML
10 INJECTION, SOLUTION INTRAVENOUS
Status: DISCONTINUED | OUTPATIENT
Start: 2017-11-27 | End: 2017-11-30 | Stop reason: HOSPADM

## 2017-11-27 RX ORDER — SODIUM CHLORIDE, SODIUM LACTATE, POTASSIUM CHLORIDE, AND CALCIUM CHLORIDE .6; .31; .03; .02 G/100ML; G/100ML; G/100ML; G/100ML
IRRIGANT IRRIGATION
Status: DISCONTINUED | OUTPATIENT
Start: 2017-11-27 | End: 2017-11-27 | Stop reason: HOSPADM

## 2017-11-27 RX ORDER — CYCLOBENZAPRINE HCL 10 MG
TABLET ORAL
Status: COMPLETED
Start: 2017-11-27 | End: 2017-11-27

## 2017-11-27 RX ORDER — TRAZODONE HYDROCHLORIDE 50 MG/1
100-150 TABLET ORAL
Status: DISCONTINUED | OUTPATIENT
Start: 2017-11-27 | End: 2017-11-30 | Stop reason: HOSPADM

## 2017-11-27 RX ORDER — DEXAMETHASONE SODIUM PHOSPHATE 4 MG/ML
4 INJECTION, SOLUTION INTRA-ARTICULAR; INTRALESIONAL; INTRAMUSCULAR; INTRAVENOUS; SOFT TISSUE EVERY 6 HOURS
Status: COMPLETED | OUTPATIENT
Start: 2017-11-27 | End: 2017-11-28

## 2017-11-27 RX ORDER — LIDOCAINE AND PRILOCAINE 25; 25 MG/G; MG/G
1 CREAM TOPICAL
Status: ACTIVE | OUTPATIENT
Start: 2017-11-27 | End: 2017-11-28

## 2017-11-27 RX ORDER — OXYCODONE AND ACETAMINOPHEN 10; 325 MG/1; MG/1
1-2 TABLET ORAL EVERY 6 HOURS PRN
Status: DISCONTINUED | OUTPATIENT
Start: 2017-11-27 | End: 2017-11-27

## 2017-11-27 RX ORDER — ONDANSETRON 4 MG/1
4 TABLET, ORALLY DISINTEGRATING ORAL EVERY 4 HOURS PRN
Status: DISCONTINUED | OUTPATIENT
Start: 2017-11-27 | End: 2017-11-30 | Stop reason: HOSPADM

## 2017-11-27 RX ORDER — ZOLPIDEM TARTRATE 5 MG/1
5 TABLET ORAL
Status: DISCONTINUED | OUTPATIENT
Start: 2017-11-28 | End: 2017-11-30 | Stop reason: HOSPADM

## 2017-11-27 RX ORDER — SODIUM CHLORIDE, SODIUM LACTATE, POTASSIUM CHLORIDE, CALCIUM CHLORIDE 600; 310; 30; 20 MG/100ML; MG/100ML; MG/100ML; MG/100ML
INJECTION, SOLUTION INTRAVENOUS CONTINUOUS
Status: DISCONTINUED | OUTPATIENT
Start: 2017-11-27 | End: 2017-11-28

## 2017-11-27 RX ORDER — ALENDRONATE SODIUM 70 MG/1
70 TABLET ORAL
Status: DISCONTINUED | OUTPATIENT
Start: 2017-11-29 | End: 2017-11-30 | Stop reason: HOSPADM

## 2017-11-27 RX ORDER — AMOXICILLIN 250 MG
1 CAPSULE ORAL NIGHTLY
Status: DISCONTINUED | OUTPATIENT
Start: 2017-11-27 | End: 2017-11-30 | Stop reason: HOSPADM

## 2017-11-27 RX ORDER — DIPHENHYDRAMINE HYDROCHLORIDE 50 MG/ML
25 INJECTION INTRAMUSCULAR; INTRAVENOUS EVERY 6 HOURS PRN
Status: DISCONTINUED | OUTPATIENT
Start: 2017-11-27 | End: 2017-11-30 | Stop reason: HOSPADM

## 2017-11-27 RX ORDER — DEXAMETHASONE SODIUM PHOSPHATE 4 MG/ML
INJECTION, SOLUTION INTRA-ARTICULAR; INTRALESIONAL; INTRAMUSCULAR; INTRAVENOUS; SOFT TISSUE
Status: COMPLETED
Start: 2017-11-27 | End: 2017-11-27

## 2017-11-27 RX ORDER — AMOXICILLIN 250 MG
1 CAPSULE ORAL
Status: DISCONTINUED | OUTPATIENT
Start: 2017-11-27 | End: 2017-11-30 | Stop reason: HOSPADM

## 2017-11-27 RX ORDER — GABAPENTIN 300 MG/1
CAPSULE ORAL
Status: COMPLETED
Start: 2017-11-27 | End: 2017-11-27

## 2017-11-27 RX ORDER — CALCIUM CARBONATE 500 MG/1
500 TABLET, CHEWABLE ORAL 2 TIMES DAILY
Status: DISCONTINUED | OUTPATIENT
Start: 2017-11-27 | End: 2017-11-30 | Stop reason: HOSPADM

## 2017-11-27 RX ORDER — LIDOCAINE HYDROCHLORIDE 10 MG/ML
0.5 INJECTION, SOLUTION INFILTRATION; PERINEURAL
Status: ACTIVE | OUTPATIENT
Start: 2017-11-27 | End: 2017-11-28

## 2017-11-27 RX ORDER — CYCLOBENZAPRINE HCL 10 MG
10 TABLET ORAL 3 TIMES DAILY PRN
Status: DISCONTINUED | OUTPATIENT
Start: 2017-11-27 | End: 2017-11-29

## 2017-11-27 RX ORDER — ACETAMINOPHEN 500 MG
500 TABLET ORAL EVERY 6 HOURS PRN
Status: DISCONTINUED | OUTPATIENT
Start: 2017-11-27 | End: 2017-11-30 | Stop reason: HOSPADM

## 2017-11-27 RX ORDER — HYDROMORPHONE HYDROCHLORIDE 2 MG/ML
INJECTION, SOLUTION INTRAMUSCULAR; INTRAVENOUS; SUBCUTANEOUS
Status: COMPLETED
Start: 2017-11-27 | End: 2017-11-27

## 2017-11-27 RX ORDER — SODIUM CHLORIDE 9 MG/ML
INJECTION, SOLUTION INTRAVENOUS CONTINUOUS
Status: DISCONTINUED | OUTPATIENT
Start: 2017-11-27 | End: 2017-11-30 | Stop reason: HOSPADM

## 2017-11-27 RX ORDER — LIDOCAINE HYDROCHLORIDE 10 MG/ML
INJECTION, SOLUTION INFILTRATION; PERINEURAL
Status: COMPLETED
Start: 2017-11-27 | End: 2017-11-27

## 2017-11-27 RX ORDER — MORPHINE SULFATE 4 MG/ML
2 INJECTION, SOLUTION INTRAMUSCULAR; INTRAVENOUS EVERY 4 HOURS PRN
Status: DISCONTINUED | OUTPATIENT
Start: 2017-11-27 | End: 2017-11-30 | Stop reason: HOSPADM

## 2017-11-27 RX ORDER — ACETAMINOPHEN 500 MG
TABLET ORAL
Status: DISPENSED
Start: 2017-11-27 | End: 2017-11-27

## 2017-11-27 RX ORDER — OMEPRAZOLE 20 MG/1
20 CAPSULE, DELAYED RELEASE ORAL DAILY
Status: DISCONTINUED | OUTPATIENT
Start: 2017-11-27 | End: 2017-11-30 | Stop reason: HOSPADM

## 2017-11-27 RX ORDER — CYCLOBENZAPRINE HCL 10 MG
10 TABLET ORAL 3 TIMES DAILY PRN
Status: DISCONTINUED | OUTPATIENT
Start: 2017-11-27 | End: 2017-11-27

## 2017-11-27 RX ORDER — OXYCODONE HCL 5 MG/5 ML
SOLUTION, ORAL ORAL
Status: COMPLETED
Start: 2017-11-27 | End: 2017-11-27

## 2017-11-27 RX ORDER — ENALAPRIL MALEATE 10 MG/1
20 TABLET ORAL EVERY EVENING
Status: DISCONTINUED | OUTPATIENT
Start: 2017-11-27 | End: 2017-11-30 | Stop reason: HOSPADM

## 2017-11-27 RX ORDER — ATORVASTATIN CALCIUM 20 MG/1
20 TABLET, FILM COATED ORAL NIGHTLY
Status: DISCONTINUED | OUTPATIENT
Start: 2017-11-27 | End: 2017-11-30 | Stop reason: HOSPADM

## 2017-11-27 RX ORDER — ENEMA 19; 7 G/133ML; G/133ML
1 ENEMA RECTAL
Status: DISCONTINUED | OUTPATIENT
Start: 2017-11-27 | End: 2017-11-30 | Stop reason: HOSPADM

## 2017-11-27 RX ORDER — POLYETHYLENE GLYCOL 3350 17 G/17G
1 POWDER, FOR SOLUTION ORAL 2 TIMES DAILY PRN
Status: DISCONTINUED | OUTPATIENT
Start: 2017-11-27 | End: 2017-11-30 | Stop reason: HOSPADM

## 2017-11-27 RX ORDER — HYDROCODONE BITARTRATE AND ACETAMINOPHEN 10; 325 MG/1; MG/1
1-2 TABLET ORAL EVERY 4 HOURS PRN
Status: DISCONTINUED | OUTPATIENT
Start: 2017-11-27 | End: 2017-11-27

## 2017-11-27 RX ORDER — GABAPENTIN 100 MG/1
100 CAPSULE ORAL 3 TIMES DAILY
Status: DISCONTINUED | OUTPATIENT
Start: 2017-11-27 | End: 2017-11-30 | Stop reason: HOSPADM

## 2017-11-27 RX ORDER — DIAZEPAM 5 MG/1
5-10 TABLET ORAL EVERY 8 HOURS PRN
Status: DISCONTINUED | OUTPATIENT
Start: 2017-11-27 | End: 2017-11-27

## 2017-11-27 RX ORDER — BUPIVACAINE HYDROCHLORIDE AND EPINEPHRINE 5; 5 MG/ML; UG/ML
INJECTION, SOLUTION EPIDURAL; INTRACAUDAL; PERINEURAL
Status: DISCONTINUED | OUTPATIENT
Start: 2017-11-27 | End: 2017-11-27 | Stop reason: HOSPADM

## 2017-11-27 RX ORDER — ACETAMINOPHEN 500 MG
TABLET ORAL
Status: COMPLETED
Start: 2017-11-27 | End: 2017-11-27

## 2017-11-27 RX ORDER — HYDRALAZINE HYDROCHLORIDE 20 MG/ML
10 INJECTION INTRAMUSCULAR; INTRAVENOUS
Status: DISCONTINUED | OUTPATIENT
Start: 2017-11-27 | End: 2017-11-30 | Stop reason: HOSPADM

## 2017-11-27 RX ORDER — ALPRAZOLAM 0.5 MG/1
0.5 TABLET ORAL 3 TIMES DAILY PRN
Status: DISCONTINUED | OUTPATIENT
Start: 2017-11-27 | End: 2017-11-29

## 2017-11-27 RX ORDER — DOCUSATE SODIUM 100 MG/1
100 CAPSULE, LIQUID FILLED ORAL 2 TIMES DAILY
Status: DISCONTINUED | OUTPATIENT
Start: 2017-11-27 | End: 2017-11-30 | Stop reason: HOSPADM

## 2017-11-27 RX ORDER — ALPRAZOLAM 0.5 MG/1
0.5 TABLET ORAL ONCE
Status: COMPLETED | OUTPATIENT
Start: 2017-11-27 | End: 2017-11-27

## 2017-11-27 RX ORDER — CLONIDINE HYDROCHLORIDE 0.1 MG/1
0.1 TABLET ORAL EVERY 4 HOURS PRN
Status: DISCONTINUED | OUTPATIENT
Start: 2017-11-27 | End: 2017-11-30 | Stop reason: HOSPADM

## 2017-11-27 RX ORDER — CEFAZOLIN SODIUM 2 G/100ML
2 INJECTION, SOLUTION INTRAVENOUS EVERY 8 HOURS
Status: COMPLETED | OUTPATIENT
Start: 2017-11-27 | End: 2017-11-28

## 2017-11-27 RX ORDER — GABAPENTIN 300 MG/1
CAPSULE ORAL
Status: DISPENSED
Start: 2017-11-27 | End: 2017-11-27

## 2017-11-27 RX ORDER — KETOROLAC TROMETHAMINE 30 MG/ML
15 INJECTION, SOLUTION INTRAMUSCULAR; INTRAVENOUS ONCE
Status: COMPLETED | OUTPATIENT
Start: 2017-11-27 | End: 2017-11-27

## 2017-11-27 RX ORDER — BISACODYL 10 MG
10 SUPPOSITORY, RECTAL RECTAL
Status: DISCONTINUED | OUTPATIENT
Start: 2017-11-27 | End: 2017-11-30 | Stop reason: HOSPADM

## 2017-11-27 RX ORDER — DIPHENHYDRAMINE HCL 25 MG
25 TABLET ORAL EVERY 6 HOURS PRN
Status: DISCONTINUED | OUTPATIENT
Start: 2017-11-27 | End: 2017-11-30 | Stop reason: HOSPADM

## 2017-11-27 RX ORDER — TRAMADOL HYDROCHLORIDE 50 MG/1
100 TABLET ORAL EVERY 6 HOURS PRN
Status: DISCONTINUED | OUTPATIENT
Start: 2017-11-27 | End: 2017-11-30 | Stop reason: HOSPADM

## 2017-11-27 RX ORDER — CYCLOBENZAPRINE HCL 10 MG
10 TABLET ORAL EVERY 8 HOURS PRN
Status: DISCONTINUED | OUTPATIENT
Start: 2017-11-27 | End: 2017-11-27

## 2017-11-27 RX ORDER — HYDROXYZINE HYDROCHLORIDE 25 MG/1
25 TABLET, FILM COATED ORAL 2 TIMES DAILY
Status: DISCONTINUED | OUTPATIENT
Start: 2017-11-27 | End: 2017-11-30 | Stop reason: HOSPADM

## 2017-11-27 RX ORDER — DIAZEPAM 5 MG/1
5 TABLET ORAL EVERY 6 HOURS PRN
Status: DISCONTINUED | OUTPATIENT
Start: 2017-11-27 | End: 2017-11-28

## 2017-11-27 RX ADMIN — KETOROLAC TROMETHAMINE 15 MG: 30 INJECTION, SOLUTION INTRAMUSCULAR at 20:18

## 2017-11-27 RX ADMIN — LIDOCAINE HYDROCHLORIDE 0.5 ML: 10 INJECTION, SOLUTION INFILTRATION; PERINEURAL at 09:15

## 2017-11-27 RX ADMIN — VITAMIN D, TAB 1000IU (100/BT) 5000 UNITS: 25 TAB at 16:32

## 2017-11-27 RX ADMIN — DIAZEPAM 5 MG: 5 TABLET ORAL at 19:00

## 2017-11-27 RX ADMIN — GABAPENTIN 100 MG: 100 CAPSULE ORAL at 17:31

## 2017-11-27 RX ADMIN — Medication 10 MG: at 13:30

## 2017-11-27 RX ADMIN — ALPRAZOLAM 0.5 MG: 0.5 TABLET ORAL at 20:19

## 2017-11-27 RX ADMIN — DEXAMETHASONE SODIUM PHOSPHATE 4 MG: 4 INJECTION, SOLUTION INTRAMUSCULAR; INTRAVENOUS at 20:13

## 2017-11-27 RX ADMIN — OXYCODONE HYDROCHLORIDE 10 MG: 5 SOLUTION ORAL at 13:26

## 2017-11-27 RX ADMIN — HYDROCODONE BITARTRATE AND ACETAMINOPHEN 1 TABLET: 10; 325 TABLET ORAL at 17:34

## 2017-11-27 RX ADMIN — FENTANYL CITRATE 50 MCG: 50 INJECTION, SOLUTION INTRAMUSCULAR; INTRAVENOUS at 12:59

## 2017-11-27 RX ADMIN — ONDANSETRON 4 MG: 2 INJECTION INTRAMUSCULAR; INTRAVENOUS at 17:34

## 2017-11-27 RX ADMIN — STANDARDIZED SENNA CONCENTRATE AND DOCUSATE SODIUM 1 TABLET: 8.6; 5 TABLET, FILM COATED ORAL at 20:14

## 2017-11-27 RX ADMIN — HYDROXYZINE HYDROCHLORIDE 25 MG: 25 TABLET ORAL at 16:30

## 2017-11-27 RX ADMIN — HYDROMORPHONE HYDROCHLORIDE 0.2 MG: 2 INJECTION INTRAMUSCULAR; INTRAVENOUS; SUBCUTANEOUS at 15:07

## 2017-11-27 RX ADMIN — SODIUM CHLORIDE, SODIUM LACTATE, POTASSIUM CHLORIDE, CALCIUM CHLORIDE: 600; 310; 30; 20 INJECTION, SOLUTION INTRAVENOUS at 09:15

## 2017-11-27 RX ADMIN — DEXAMETHASONE SODIUM PHOSPHATE 4 MG: 4 INJECTION, SOLUTION INTRAMUSCULAR; INTRAVENOUS at 15:15

## 2017-11-27 RX ADMIN — CEFAZOLIN SODIUM 2 G: 2 INJECTION, SOLUTION INTRAVENOUS at 18:22

## 2017-11-27 RX ADMIN — GABAPENTIN 300 MG: 300 CAPSULE ORAL at 10:15

## 2017-11-27 RX ADMIN — ANTACID TABLETS 500 MG: 500 TABLET, CHEWABLE ORAL at 16:30

## 2017-11-27 RX ADMIN — DOCUSATE SODIUM 100 MG: 100 CAPSULE ORAL at 20:09

## 2017-11-27 RX ADMIN — ATORVASTATIN CALCIUM 20 MG: 20 TABLET, FILM COATED ORAL at 20:13

## 2017-11-27 RX ADMIN — ALPRAZOLAM 0.5 MG: 0.5 TABLET ORAL at 20:18

## 2017-11-27 RX ADMIN — HYDROMORPHONE HYDROCHLORIDE 0.2 MG: 2 INJECTION INTRAMUSCULAR; INTRAVENOUS; SUBCUTANEOUS at 14:52

## 2017-11-27 RX ADMIN — OMEPRAZOLE 20 MG: 20 CAPSULE, DELAYED RELEASE ORAL at 16:30

## 2017-11-27 RX ADMIN — ACETAMINOPHEN 500 MG: 500 TABLET, FILM COATED ORAL at 10:15

## 2017-11-27 RX ADMIN — TRAZODONE HYDROCHLORIDE 100 MG: 50 TABLET ORAL at 22:48

## 2017-11-27 RX ADMIN — FENTANYL CITRATE 50 MCG: 50 INJECTION, SOLUTION INTRAMUSCULAR; INTRAVENOUS at 13:35

## 2017-11-27 RX ADMIN — ENALAPRIL MALEATE 20 MG: 10 TABLET ORAL at 20:13

## 2017-11-27 RX ADMIN — HYDROMORPHONE HYDROCHLORIDE 0.2 MG: 2 INJECTION INTRAMUSCULAR; INTRAVENOUS; SUBCUTANEOUS at 14:44

## 2017-11-27 RX ADMIN — CYCLOBENZAPRINE HYDROCHLORIDE 10 MG: 10 TABLET, FILM COATED ORAL at 13:30

## 2017-11-27 RX ADMIN — SODIUM CHLORIDE: 9 INJECTION, SOLUTION INTRAVENOUS at 16:26

## 2017-11-27 RX ADMIN — DEXAMETHASONE SODIUM PHOSPHATE 4 MG: 4 INJECTION, SOLUTION INTRA-ARTICULAR; INTRALESIONAL; INTRAMUSCULAR; INTRAVENOUS; SOFT TISSUE at 15:15

## 2017-11-27 ASSESSMENT — PAIN SCALES - GENERAL
PAINLEVEL_OUTOF10: 9
PAINLEVEL_OUTOF10: 10
PAINLEVEL_OUTOF10: 10
PAINLEVEL_OUTOF10: 7
PAINLEVEL_OUTOF10: 9
PAINLEVEL_OUTOF10: 6
PAINLEVEL_OUTOF10: 7
PAINLEVEL_OUTOF10: 10
PAINLEVEL_OUTOF10: 6
PAINLEVEL_OUTOF10: 4
PAINLEVEL_OUTOF10: 8
PAINLEVEL_OUTOF10: 5
PAINLEVEL_OUTOF10: 8

## 2017-11-27 ASSESSMENT — PATIENT HEALTH QUESTIONNAIRE - PHQ9
3. TROUBLE FALLING OR STAYING ASLEEP OR SLEEPING TOO MUCH: NOT AT ALL
6. FEELING BAD ABOUT YOURSELF - OR THAT YOU ARE A FAILURE OR HAVE LET YOURSELF OR YOUR FAMILY DOWN: MORE THAN HALF THE DAYS
9. THOUGHTS THAT YOU WOULD BE BETTER OFF DEAD, OR OF HURTING YOURSELF: NOT AT ALL
5. POOR APPETITE OR OVEREATING: NOT AT ALL
4. FEELING TIRED OR HAVING LITTLE ENERGY: NOT AT ALL
9. THOUGHTS THAT YOU WOULD BE BETTER OFF DEAD, OR OF HURTING YOURSELF: NOT AT ALL
SUM OF ALL RESPONSES TO PHQ9 QUESTIONS 1 AND 2: 2
8. MOVING OR SPEAKING SO SLOWLY THAT OTHER PEOPLE COULD HAVE NOTICED. OR THE OPPOSITE, BEING SO FIGETY OR RESTLESS THAT YOU HAVE BEEN MOVING AROUND A LOT MORE THAN USUAL: NOT AT ALL
8. MOVING OR SPEAKING SO SLOWLY THAT OTHER PEOPLE COULD HAVE NOTICED. OR THE OPPOSITE, BEING SO FIGETY OR RESTLESS THAT YOU HAVE BEEN MOVING AROUND A LOT MORE THAN USUAL: NOT AT ALL
4. FEELING TIRED OR HAVING LITTLE ENERGY: NOT AT ALL
5. POOR APPETITE OR OVEREATING: NOT AT ALL
SUM OF ALL RESPONSES TO PHQ QUESTIONS 1-9: 6
2. FEELING DOWN, DEPRESSED, IRRITABLE, OR HOPELESS: SEVERAL DAYS
2. FEELING DOWN, DEPRESSED, IRRITABLE, OR HOPELESS: SEVERAL DAYS
6. FEELING BAD ABOUT YOURSELF - OR THAT YOU ARE A FAILURE OR HAVE LET YOURSELF OR YOUR FAMILY DOWN: MORE THAN HALF THE DAYS
3. TROUBLE FALLING OR STAYING ASLEEP OR SLEEPING TOO MUCH: NOT AT ALL
7. TROUBLE CONCENTRATING ON THINGS, SUCH AS READING THE NEWSPAPER OR WATCHING TELEVISION: MORE THAN HALF THE DAYS
SUM OF ALL RESPONSES TO PHQ QUESTIONS 1-9: 6
SUM OF ALL RESPONSES TO PHQ9 QUESTIONS 1 AND 2: 2
7. TROUBLE CONCENTRATING ON THINGS, SUCH AS READING THE NEWSPAPER OR WATCHING TELEVISION: MORE THAN HALF THE DAYS
1. LITTLE INTEREST OR PLEASURE IN DOING THINGS: SEVERAL DAYS
1. LITTLE INTEREST OR PLEASURE IN DOING THINGS: SEVERAL DAYS

## 2017-11-27 ASSESSMENT — LIFESTYLE VARIABLES
EVER_SMOKED: YES
TOTAL SCORE: 0
ALCOHOL_USE: YES
HAVE PEOPLE ANNOYED YOU BY CRITICIZING YOUR DRINKING: NO
ON A TYPICAL DAY WHEN YOU DRINK ALCOHOL HOW MANY DRINKS DO YOU HAVE: 0
HAVE YOU EVER FELT YOU SHOULD CUT DOWN ON YOUR DRINKING: NO
EVER HAD A DRINK FIRST THING IN THE MORNING TO STEADY YOUR NERVES TO GET RID OF A HANGOVER: NO
AVERAGE NUMBER OF DAYS PER WEEK YOU HAVE A DRINK CONTAINING ALCOHOL: 0
CONSUMPTION TOTAL: NEGATIVE
HOW MANY TIMES IN THE PAST YEAR HAVE YOU HAD 5 OR MORE DRINKS IN A DAY: 0
TOTAL SCORE: 0
EVER FELT BAD OR GUILTY ABOUT YOUR DRINKING: NO
TOTAL SCORE: 0
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
## 2017-11-27 NOTE — OR SURGEON
Immediate Post OP Note    PreOp Diagnosis: C4-7 stenosis    PostOp Diagnosis: same    Procedure(s):  CERVICAL DISK AND FUSION ANTERIOR C4-7 WITH PLATE - Wound Class: Clean with Drain    Surgeon(s):  Prem Back M.D.    Anesthesiologist/Type of Anesthesia:  Anesthesiologist: Himanshu Agosto M.D./General    Surgical Staff:  Assistant: Elizabeth Donaldson P.A.-C.  Circulator: Amy Tran R.N.  Relief Circulator: Brianne Salinas R.N.  Relief Scrub: Joseph Martinez  Scrub Person: Cintia Maurer    Specimens:  * No specimens in log *    Estimated Blood Loss: minimal    Findings: severe stenosis relieved, stable ssep's, emg's, mep's, rln monitoring    Complications: none        11/27/2017 12:08 PM Prem Back

## 2017-11-28 PROCEDURE — 700102 HCHG RX REV CODE 250 W/ 637 OVERRIDE(OP): Performed by: PHYSICIAN ASSISTANT

## 2017-11-28 PROCEDURE — A9270 NON-COVERED ITEM OR SERVICE: HCPCS | Performed by: PHYSICIAN ASSISTANT

## 2017-11-28 PROCEDURE — G8987 SELF CARE CURRENT STATUS: HCPCS | Mod: CK

## 2017-11-28 PROCEDURE — 770001 HCHG ROOM/CARE - MED/SURG/GYN PRIV*

## 2017-11-28 PROCEDURE — 700102 HCHG RX REV CODE 250 W/ 637 OVERRIDE(OP): Performed by: NEUROLOGICAL SURGERY

## 2017-11-28 PROCEDURE — 700105 HCHG RX REV CODE 258: Performed by: PHYSICIAN ASSISTANT

## 2017-11-28 PROCEDURE — 700112 HCHG RX REV CODE 229: Performed by: PHYSICIAN ASSISTANT

## 2017-11-28 PROCEDURE — A9270 NON-COVERED ITEM OR SERVICE: HCPCS | Performed by: NEUROLOGICAL SURGERY

## 2017-11-28 PROCEDURE — 97162 PT EVAL MOD COMPLEX 30 MIN: CPT

## 2017-11-28 PROCEDURE — G8978 MOBILITY CURRENT STATUS: HCPCS | Mod: CK

## 2017-11-28 PROCEDURE — G8979 MOBILITY GOAL STATUS: HCPCS | Mod: CI

## 2017-11-28 PROCEDURE — G8988 SELF CARE GOAL STATUS: HCPCS | Mod: CI

## 2017-11-28 PROCEDURE — 700111 HCHG RX REV CODE 636 W/ 250 OVERRIDE (IP): Performed by: PHYSICIAN ASSISTANT

## 2017-11-28 PROCEDURE — 97165 OT EVAL LOW COMPLEX 30 MIN: CPT

## 2017-11-28 RX ORDER — MORPHINE SULFATE 15 MG/1
15 TABLET, FILM COATED, EXTENDED RELEASE ORAL EVERY 12 HOURS
Status: DISCONTINUED | OUTPATIENT
Start: 2017-11-28 | End: 2017-11-30 | Stop reason: HOSPADM

## 2017-11-28 RX ADMIN — GABAPENTIN 100 MG: 100 CAPSULE ORAL at 05:24

## 2017-11-28 RX ADMIN — OMEPRAZOLE 20 MG: 20 CAPSULE, DELAYED RELEASE ORAL at 10:14

## 2017-11-28 RX ADMIN — DEXAMETHASONE SODIUM PHOSPHATE 4 MG: 4 INJECTION, SOLUTION INTRAMUSCULAR; INTRAVENOUS at 01:54

## 2017-11-28 RX ADMIN — ALPRAZOLAM 0.5 MG: 0.5 TABLET ORAL at 15:26

## 2017-11-28 RX ADMIN — ANTACID TABLETS 500 MG: 500 TABLET, CHEWABLE ORAL at 20:13

## 2017-11-28 RX ADMIN — TRAMADOL HYDROCHLORIDE 100 MG: 50 TABLET, COATED ORAL at 15:23

## 2017-11-28 RX ADMIN — ENALAPRIL MALEATE 20 MG: 10 TABLET ORAL at 20:14

## 2017-11-28 RX ADMIN — GABAPENTIN 100 MG: 100 CAPSULE ORAL at 20:14

## 2017-11-28 RX ADMIN — SODIUM CHLORIDE: 9 INJECTION, SOLUTION INTRAVENOUS at 01:54

## 2017-11-28 RX ADMIN — TRAZODONE HYDROCHLORIDE 150 MG: 50 TABLET ORAL at 20:46

## 2017-11-28 RX ADMIN — ALPRAZOLAM 0.5 MG: 0.5 TABLET ORAL at 05:25

## 2017-11-28 RX ADMIN — ONDANSETRON 4 MG: 2 INJECTION INTRAMUSCULAR; INTRAVENOUS at 10:14

## 2017-11-28 RX ADMIN — CYCLOBENZAPRINE 10 MG: 10 TABLET, FILM COATED ORAL at 11:45

## 2017-11-28 RX ADMIN — CEFAZOLIN SODIUM 2 G: 2 INJECTION, SOLUTION INTRAVENOUS at 03:51

## 2017-11-28 RX ADMIN — MORPHINE SULFATE 2 MG: 4 INJECTION INTRAVENOUS at 05:49

## 2017-11-28 RX ADMIN — ATORVASTATIN CALCIUM 20 MG: 20 TABLET, FILM COATED ORAL at 20:13

## 2017-11-28 RX ADMIN — ONDANSETRON 4 MG: 2 INJECTION INTRAMUSCULAR; INTRAVENOUS at 01:47

## 2017-11-28 RX ADMIN — HYDROXYZINE HYDROCHLORIDE 25 MG: 25 TABLET ORAL at 20:14

## 2017-11-28 RX ADMIN — STANDARDIZED SENNA CONCENTRATE AND DOCUSATE SODIUM 1 TABLET: 8.6; 5 TABLET, FILM COATED ORAL at 20:14

## 2017-11-28 RX ADMIN — DEXAMETHASONE SODIUM PHOSPHATE 4 MG: 4 INJECTION, SOLUTION INTRAMUSCULAR; INTRAVENOUS at 10:14

## 2017-11-28 RX ADMIN — MORPHINE SULFATE 2 MG: 4 INJECTION INTRAVENOUS at 01:48

## 2017-11-28 RX ADMIN — TRAMADOL HYDROCHLORIDE 100 MG: 50 TABLET, COATED ORAL at 22:04

## 2017-11-28 RX ADMIN — ANTACID TABLETS 500 MG: 500 TABLET, CHEWABLE ORAL at 10:13

## 2017-11-28 RX ADMIN — CYCLOBENZAPRINE 10 MG: 10 TABLET, FILM COATED ORAL at 20:13

## 2017-11-28 RX ADMIN — CEFAZOLIN SODIUM 2 G: 2 INJECTION, SOLUTION INTRAVENOUS at 11:45

## 2017-11-28 RX ADMIN — DOCUSATE SODIUM 100 MG: 100 CAPSULE ORAL at 20:13

## 2017-11-28 RX ADMIN — GABAPENTIN 100 MG: 100 CAPSULE ORAL at 11:54

## 2017-11-28 RX ADMIN — HYDROXYZINE HYDROCHLORIDE 25 MG: 25 TABLET ORAL at 10:13

## 2017-11-28 RX ADMIN — MORPHINE SULFATE 15 MG: 15 TABLET, EXTENDED RELEASE ORAL at 10:14

## 2017-11-28 RX ADMIN — VITAMIN D, TAB 1000IU (100/BT) 5000 UNITS: 25 TAB at 10:13

## 2017-11-28 RX ADMIN — MORPHINE SULFATE 15 MG: 15 TABLET, EXTENDED RELEASE ORAL at 20:13

## 2017-11-28 RX ADMIN — DOCUSATE SODIUM 100 MG: 100 CAPSULE ORAL at 10:13

## 2017-11-28 ASSESSMENT — PAIN SCALES - GENERAL
PAINLEVEL_OUTOF10: 4
PAINLEVEL_OUTOF10: 6
PAINLEVEL_OUTOF10: 4
PAINLEVEL_OUTOF10: 4
PAINLEVEL_OUTOF10: 6
PAINLEVEL_OUTOF10: 4
PAINLEVEL_OUTOF10: 6
PAINLEVEL_OUTOF10: ASSUMED PAIN PRESENT
PAINLEVEL_OUTOF10: 3
PAINLEVEL_OUTOF10: 4

## 2017-11-28 ASSESSMENT — COGNITIVE AND FUNCTIONAL STATUS - GENERAL
TOILETING: A LITTLE
DRESSING REGULAR UPPER BODY CLOTHING: A LITTLE
SUGGESTED CMS G CODE MODIFIER MOBILITY: CK
STANDING UP FROM CHAIR USING ARMS: A LITTLE
DAILY ACTIVITIY SCORE: 19
DRESSING REGULAR LOWER BODY CLOTHING: A LITTLE
HELP NEEDED FOR BATHING: A LITTLE
SUGGESTED CMS G CODE MODIFIER DAILY ACTIVITY: CK
TURNING FROM BACK TO SIDE WHILE IN FLAT BAD: A LITTLE
PERSONAL GROOMING: A LITTLE
MOBILITY SCORE: 18
WALKING IN HOSPITAL ROOM: A LITTLE
MOVING TO AND FROM BED TO CHAIR: A LITTLE
MOVING FROM LYING ON BACK TO SITTING ON SIDE OF FLAT BED: A LITTLE
CLIMB 3 TO 5 STEPS WITH RAILING: A LITTLE

## 2017-11-28 ASSESSMENT — ACTIVITIES OF DAILY LIVING (ADL): TOILETING: INDEPENDENT

## 2017-11-28 ASSESSMENT — GAIT ASSESSMENTS
GAIT LEVEL OF ASSIST: CONTACT GUARD ASSIST
DISTANCE (FEET): 100
DEVIATION: BRADYKINETIC;SHUFFLED GAIT
ASSISTIVE DEVICE: FRONT WHEEL WALKER

## 2017-11-28 NOTE — DISCHARGE PLANNING
Current documentation reveals a potential for acute inpatient rehabilitation, pending an OT eval.  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  Please consider a PMR referral to assist with discharge planning.

## 2017-11-28 NOTE — THERAPY
"Occupational Therapy Evaluation completed.   Functional Status:  Min A supine>sit EOB, CGA w/sit>Stand walking in room w/fww, unsteady, CGA w/toilet txf, min A w/grooming standing at sink, max A w/UB dressing and collar management, mod A w/LB dressing, c/o fatigue and pain RN aware, min A sit>supine BTB provided spinal precautions packet but needs review   Plan of Care: Will benefit from Occupational Therapy 4 times per week  Discharge Recommendations:  Equipment: Will Continue to Assess for Equipment Needs. Post-acute therapy Discharge to be determined     See \"Rehab Therapy-Acute\" Patient Summary Report for complete documentation.    "

## 2017-11-28 NOTE — CARE PLAN
Problem: Safety  Goal: Will remain free from injury    Intervention: Provide assistance with mobility  Patient assisted to bathroom using front wheel walker.  Uses call light appropriately for assistance.  Bed alarm activated.  Call light within reach.      Problem: Infection  Goal: Will remain free from infection    Intervention: Assess signs and symptoms of infection  Standard precautions in place.  Dressing to anterior neck, clean, dry, intact.  Vital signs monitored.      Problem: Venous Thromboembolism (VTW)/Deep Vein Thrombosis (DVT) Prevention:  Goal: Patient will participate in Venous Thrombosis (VTE)/Deep Vein Thrombosis (DVT)Prevention Measures    Intervention: Ensure patient wears graduated elastic stockings (CIERRA hose) and/or SCDs, if ordered, when in bed or chair (Remove at least once per shift for skin check)  SCD's to bilateral lower extremities in place.  Encouraged to perform ROM to bilateral lower extremities while awake.

## 2017-11-28 NOTE — THERAPY
"Physical Therapy Evaluation completed.   Bed Mobility:  Supine to Sit: Minimal Assist  Transfers: Sit to Stand: Contact Guard Assist  Gait: Level Of Assist: Contact Guard Assist with Front-Wheel Walker       Plan of Care: Will benefit from Physical Therapy 4 times per week  Discharge Recommendations: Equipment: Front-Wheel Walker. Post-acute therapy Discharge to home with outpatient or home health for additional skilled therapy services.    See \"Rehab Therapy-Acute\" Patient Summary Report for complete documentation.   pt presents w/ weakness, decreased activity tolerance, pain, decreased balance, decreased knowledge of prec and decreased weight shifting limiting functional mobility. Pt will benefit from skilled acute PT services to address deficits. pt motivated to move but reprtos not sleeping for about 25 hours and needing a nap. pt left in bed after session. Rn notified of session and mobility.   "

## 2017-11-28 NOTE — PROGRESS NOTES
AAOX4, CORNELL   Denies N/T  Up with 1x assist    Pt complaining of posterior neck pain at  5/10   Hemovac in place, compressed   Dressing clean, dry and intact  IVF running, tolerating liquids without difficulty  Hard collar in place  Awaiting first void by 1830  Bed alarm on. Call light within reach. Treaded socks and SCDs in place

## 2017-11-28 NOTE — NON-PROVIDER
Neurosurgery Progress Note    Subjective:  Ambulatory to bathroom, reports some dizziness  Bond out, not passing gas yet  x2 episodes of emesis overnight, not tolerating PO medications secondary to nausea  Requesting IV morphine, received Tramadol and Xanax last night  Pain 5/10, nausea has resolved  Denies new pain, numbness, weakness  Denies HA, chest pain, SOB, difficulty breathing, chills      Exam:  VSS  A&O x 4, NAD  No hoarseness of voice  Trachea midline, no difficulty swallowing- no coughing or choking while drinking water  No nuchal rigidity  NM: 5/5 deltoid, handgrip, intrinsics, 4/5 left biceps and triceps, handgrip   Sensation intact and equal throughout all extremities  Pulmonary: non-labored breathing on 2L O2 via nasal cannula, normal respiratory effort  No LE edema, erythema, cyanosis, clubbing  Calves non-tender to compression bilaterally  Incision CDI, no halo sign  C-collar being worn properly  Drain 35 cc      BP  Min: 117/52  Max: 165/90  Pulse  Av.4  Min: 76  Max: 98  Resp  Avg: 15.1  Min: 11  Max: 18  Temp  Av.4 °C (97.6 °F)  Min: 36.2 °C (97.2 °F)  Max: 36.8 °C (98.3 °F)  SpO2  Av %  Min: 94 %  Max: 98 %    No Data Recorded        No results for input(s): SODIUM, POTASSIUM, CHLORIDE, CO2, GLUCOSE, BUN, CPKTOTAL in the last 72 hours.            Intake/Output       17 07 - 17 0659 17 07 - 17 0659       Total [id number] Total       Intake    P.O.  120  -- 120  --  -- --    P.O. 120 -- 120 -- -- --    I.V.  1916  930 2846  --  -- --    Crystalloid Intake 1700 -- 1700 -- -- --    IV Piggyback Volume (IV Piggyback) 50 100 150 -- -- --    IV Volume (NS) 166 830 996 -- -- --    Total Intake 2036 930 2966 -- -- --       Output    Urine  250  -- 250  --  -- --    Number of Times Voided 1 x 2 x 3 x -- -- --    Indwelling Cathether 250 -- 250 -- -- --    Drains  --  35 35  --  -- --    Hemovac 1 -- 35 35 -- -- --    Stool  --  -- --   --  -- --    Number of Times Stooled 0 x -- 0 x -- -- --    Total Output 250 35 285 -- -- --       Net I/O     [phone] -- -- --            Intake/Output Summary (Last 24 hours) at 11/28/17 0756  Last data filed at 11/28/17 0600   Gross per 24 hour   Intake             2966 ml   Output              285 ml   Net             2681 ml            • lidocaine-prilocaine  1 Application Once PRN    Or   • lidocaine  0.5 mL Once PRN   • LR   Continuous   • [START ON 11/29/2017] alendronate  70 mg Q7 DAYS   • atorvastatin  20 mg Nightly   • enalapril  20 mg Q EVENING   • gabapentin  100 mg TID   • hydrOXYzine HCl  25 mg BID   • omeprazole  20 mg DAILY   • trazodone  100-150 mg QHS PRN   • MD ALERT...Do not administer NSAIDS or ASPIRIN unless ORDERED By Neurosurgery  1 Each PRN   • docusate sodium  100 mg BID   • senna-docusate  1 Tab Nightly   • senna-docusate  1 Tab Q24HRS PRN   • polyethylene glycol/lytes  1 Packet BID PRN   • magnesium hydroxide  30 mL QDAY PRN   • bisacodyl  10 mg Q24HRS PRN   • fleet  1 Each Once PRN   • NS   Continuous   • acetaminophen  500 mg Q6HRS PRN   • ceFAZolin  2 g Q8HR   • diphenhydrAMINE  25 mg Q6HRS PRN    Or   • diphenhydrAMINE  25 mg Q6HRS PRN   • ondansetron  4 mg Q4HRS PRN   • ondansetron  4 mg Q4HRS PRN   • dexamethasone  4 mg Q6HRS   • zolpidem  5 mg HS PRN - MR X 1   • clonidine  0.1 mg Q4HRS PRN   • hydrALAZINE  10 mg Q HOUR PRN   • labetalol  10 mg Q HOUR PRN   • vitamin D  5,000 Units DAILY   • calcium carbonate  500 mg BID   • benzocaine-menthol  1 Lozenge Q2HRS PRN   • morphine injection  2 mg Q4HRS PRN   • pneumococcal 13-Luisa Conj Vacc  0.5 mL Once PRN   • tramadol  100 mg Q6HRS PRN   • cyclobenzaprine  10 mg TID PRN    Or   • diazepam  5 mg Q6HRS PRN    Or   • diazepam  10 mg Q6HRS PRN   • alprazolam  0.5 mg TID PRN       Assessment and Plan:  POD #1   CERVICAL DISK AND FUSION ANTERIOR C4-7 WITH PLATE  Encourage to ambulate and work with therapies  Continue incentive  spirometry Q1H  Continue pain control  Continue stool softeners to encourage BM  Discussed pain control with pt, nursing - will add MS contin as pt states morphine works and doesn't contribute to nausea. OK to medicate for nausea with zofran, phenergan, compazine.     Plan to d/c tomorrow pending improved pain control and wean off oxygen  Patient agrees with treatment plan.   Case discussed with Dr. Back.

## 2017-11-28 NOTE — OR NURSING
Pt A&OX4. Oxygen titrated down to 2 L via nasal cannula. Pt in hard cervical collar. Dressing to anterior surgical incision CDI with Hemovac in place. Bedside dysphagia screening competed. Pt has no complaints of nausea. Pt complains of pain to BUE and neck, mediated per anesthesia orders. Bond removed by OR team upon arrival to PACU and Dr.Song Elizabeth's P.A., notified. Report called to receiving RNArminda. Pt off unit with transport via [facility].

## 2017-11-28 NOTE — CARE PLAN
Problem: Neuro Status  Goal: Monitor neuro status and rapid identification of neuro changes    Intervention: Neuro checks Q 4 hours and PRN (see Neuro flowsheet)  q4h neuro checks in place      Problem: Risk for Deep Vein Thrombosis/Venous Thromboembolism  Goal: DVT/VTE Prevention Measures in Place  scds in place, pt ambulated POD 0

## 2017-11-28 NOTE — PROGRESS NOTES
Pt A&Ox4, numbness and tingling in L arm, pain is 6/10 (pain medication given).    Bed alarm on, call light within reach, pt educated on importance of using the call light when she needs assistance, pt verbalized understanding.

## 2017-11-28 NOTE — PROGRESS NOTES
RN educated pt on importance of trying oral pain medications so we can identify effective oral pain medication that will manage her pain once she is discharged. Pt has only been getting pain med IV.    This was also addressed with SAL Johnson. SAL Johnson stated she will discuss this with the pt as well.

## 2017-11-29 ENCOUNTER — HOME HEALTH ADMISSION (OUTPATIENT)
Dept: HOME HEALTH SERVICES | Facility: HOME HEALTHCARE | Age: 67
End: 2017-11-29
Payer: MEDICARE

## 2017-11-29 PROBLEM — M50.00 INTERVERTEBRAL CERVICAL DISC DISORDER WITH MYELOPATHY, CERVICAL REGION: Status: ACTIVE | Noted: 2017-11-29

## 2017-11-29 PROCEDURE — 97535 SELF CARE MNGMENT TRAINING: CPT

## 2017-11-29 PROCEDURE — A9270 NON-COVERED ITEM OR SERVICE: HCPCS | Performed by: PHYSICIAN ASSISTANT

## 2017-11-29 PROCEDURE — 700111 HCHG RX REV CODE 636 W/ 250 OVERRIDE (IP): Performed by: PHYSICIAN ASSISTANT

## 2017-11-29 PROCEDURE — 770001 HCHG ROOM/CARE - MED/SURG/GYN PRIV*

## 2017-11-29 PROCEDURE — A9270 NON-COVERED ITEM OR SERVICE: HCPCS | Performed by: NEUROLOGICAL SURGERY

## 2017-11-29 PROCEDURE — 700111 HCHG RX REV CODE 636 W/ 250 OVERRIDE (IP): Performed by: NEUROLOGICAL SURGERY

## 2017-11-29 PROCEDURE — 700102 HCHG RX REV CODE 250 W/ 637 OVERRIDE(OP): Performed by: NEUROLOGICAL SURGERY

## 2017-11-29 PROCEDURE — 700102 HCHG RX REV CODE 250 W/ 637 OVERRIDE(OP): Performed by: PHYSICIAN ASSISTANT

## 2017-11-29 PROCEDURE — 700112 HCHG RX REV CODE 229: Performed by: PHYSICIAN ASSISTANT

## 2017-11-29 RX ORDER — DIAZEPAM 5 MG/1
5 TABLET ORAL EVERY 8 HOURS PRN
Status: DISCONTINUED | OUTPATIENT
Start: 2017-11-29 | End: 2017-11-29

## 2017-11-29 RX ORDER — DIAZEPAM 5 MG/1
5 TABLET ORAL EVERY 8 HOURS PRN
Status: DISCONTINUED | OUTPATIENT
Start: 2017-11-29 | End: 2017-11-30 | Stop reason: HOSPADM

## 2017-11-29 RX ORDER — CYCLOBENZAPRINE HCL 10 MG
10 TABLET ORAL 3 TIMES DAILY
Status: DISCONTINUED | OUTPATIENT
Start: 2017-11-29 | End: 2017-11-30 | Stop reason: HOSPADM

## 2017-11-29 RX ORDER — DEXAMETHASONE SODIUM PHOSPHATE 4 MG/ML
4 INJECTION, SOLUTION INTRA-ARTICULAR; INTRALESIONAL; INTRAMUSCULAR; INTRAVENOUS; SOFT TISSUE EVERY 6 HOURS
Status: DISCONTINUED | OUTPATIENT
Start: 2017-11-29 | End: 2017-11-30 | Stop reason: HOSPADM

## 2017-11-29 RX ORDER — METHYLPREDNISOLONE ACETATE 80 MG/ML
80 INJECTION, SUSPENSION INTRA-ARTICULAR; INTRALESIONAL; INTRAMUSCULAR; SOFT TISSUE ONCE
Status: COMPLETED | OUTPATIENT
Start: 2017-11-29 | End: 2017-11-29

## 2017-11-29 RX ADMIN — MORPHINE SULFATE 15 MG: 15 TABLET, EXTENDED RELEASE ORAL at 20:12

## 2017-11-29 RX ADMIN — CYCLOBENZAPRINE 10 MG: 10 TABLET, FILM COATED ORAL at 04:11

## 2017-11-29 RX ADMIN — MORPHINE SULFATE 2 MG: 4 INJECTION INTRAVENOUS at 10:33

## 2017-11-29 RX ADMIN — HYDROXYZINE HYDROCHLORIDE 25 MG: 25 TABLET ORAL at 20:12

## 2017-11-29 RX ADMIN — DEXAMETHASONE SODIUM PHOSPHATE 4 MG: 4 INJECTION, SOLUTION INTRAMUSCULAR; INTRAVENOUS at 13:36

## 2017-11-29 RX ADMIN — ALPRAZOLAM 0.5 MG: 0.5 TABLET ORAL at 08:46

## 2017-11-29 RX ADMIN — METHYLPREDNISOLONE ACETATE 80 MG: 80 INJECTION, SUSPENSION INTRA-ARTICULAR; INTRALESIONAL; INTRAMUSCULAR; SOFT TISSUE at 06:36

## 2017-11-29 RX ADMIN — VITAMIN D, TAB 1000IU (100/BT) 5000 UNITS: 25 TAB at 08:48

## 2017-11-29 RX ADMIN — CYCLOBENZAPRINE 10 MG: 10 TABLET, FILM COATED ORAL at 08:46

## 2017-11-29 RX ADMIN — DOCUSATE SODIUM 100 MG: 100 CAPSULE ORAL at 08:48

## 2017-11-29 RX ADMIN — ENALAPRIL MALEATE 20 MG: 10 TABLET ORAL at 20:12

## 2017-11-29 RX ADMIN — GABAPENTIN 100 MG: 100 CAPSULE ORAL at 20:12

## 2017-11-29 RX ADMIN — ATORVASTATIN CALCIUM 20 MG: 20 TABLET, FILM COATED ORAL at 20:12

## 2017-11-29 RX ADMIN — GABAPENTIN 100 MG: 100 CAPSULE ORAL at 13:36

## 2017-11-29 RX ADMIN — BENZOCAINE AND MENTHOL 1 LOZENGE: 15; 3.6 LOZENGE ORAL at 08:47

## 2017-11-29 RX ADMIN — HYDROXYZINE HYDROCHLORIDE 25 MG: 25 TABLET ORAL at 08:48

## 2017-11-29 RX ADMIN — ANTACID TABLETS 500 MG: 500 TABLET, CHEWABLE ORAL at 08:47

## 2017-11-29 RX ADMIN — DEXAMETHASONE SODIUM PHOSPHATE 4 MG: 4 INJECTION, SOLUTION INTRAMUSCULAR; INTRAVENOUS at 18:12

## 2017-11-29 RX ADMIN — OMEPRAZOLE 20 MG: 20 CAPSULE, DELAYED RELEASE ORAL at 08:47

## 2017-11-29 RX ADMIN — MORPHINE SULFATE 15 MG: 15 TABLET, EXTENDED RELEASE ORAL at 08:47

## 2017-11-29 RX ADMIN — ANTACID TABLETS 500 MG: 500 TABLET, CHEWABLE ORAL at 20:12

## 2017-11-29 RX ADMIN — ALENDRONATE SODIUM 70 MG: 70 TABLET ORAL at 06:37

## 2017-11-29 RX ADMIN — STANDARDIZED SENNA CONCENTRATE AND DOCUSATE SODIUM 1 TABLET: 8.6; 5 TABLET, FILM COATED ORAL at 20:12

## 2017-11-29 RX ADMIN — DOCUSATE SODIUM 100 MG: 100 CAPSULE ORAL at 20:12

## 2017-11-29 RX ADMIN — CYCLOBENZAPRINE 10 MG: 10 TABLET, FILM COATED ORAL at 20:12

## 2017-11-29 RX ADMIN — CYCLOBENZAPRINE 10 MG: 10 TABLET, FILM COATED ORAL at 13:36

## 2017-11-29 RX ADMIN — ACETAMINOPHEN 500 MG: 500 TABLET ORAL at 01:25

## 2017-11-29 RX ADMIN — TRAMADOL HYDROCHLORIDE 100 MG: 50 TABLET, COATED ORAL at 13:36

## 2017-11-29 RX ADMIN — MORPHINE SULFATE 2 MG: 4 INJECTION INTRAVENOUS at 01:21

## 2017-11-29 RX ADMIN — GABAPENTIN 100 MG: 100 CAPSULE ORAL at 04:11

## 2017-11-29 RX ADMIN — TRAMADOL HYDROCHLORIDE 100 MG: 50 TABLET, COATED ORAL at 04:11

## 2017-11-29 ASSESSMENT — PAIN SCALES - GENERAL
PAINLEVEL_OUTOF10: 5
PAINLEVEL_OUTOF10: 8
PAINLEVEL_OUTOF10: 5
PAINLEVEL_OUTOF10: 4
PAINLEVEL_OUTOF10: 7
PAINLEVEL_OUTOF10: 3
PAINLEVEL_OUTOF10: 10
PAINLEVEL_OUTOF10: 6

## 2017-11-29 ASSESSMENT — COGNITIVE AND FUNCTIONAL STATUS - GENERAL
DAILY ACTIVITIY SCORE: 17
PERSONAL GROOMING: A LITTLE
TOILETING: A LITTLE
EATING MEALS: A LITTLE
DRESSING REGULAR LOWER BODY CLOTHING: A LOT
DRESSING REGULAR UPPER BODY CLOTHING: A LITTLE
HELP NEEDED FOR BATHING: A LITTLE
SUGGESTED CMS G CODE MODIFIER DAILY ACTIVITY: CK

## 2017-11-29 NOTE — PROGRESS NOTES
Dr. Back called for updates regarding patients, informed him that this patient was having some difficulty swallowing, fluids and pills going down okay and O2 above 90%.     Orders for depromedrol 80mg IM one time dose.

## 2017-11-29 NOTE — THERAPY
"Occupational Therapy Treatment completed with focus on ADLs and ADL transfers.  Functional Status:  Pt min A logroll supine>sit EOB, CGA sit>stand with FWW, close CGA for mobility to bathroom with c/o lightheadedness and pain. Pt min A for toilet txf, min A logroll BTB d/t significant pain and inability to complete other activities. Pt lef tin bed with bed alarm on, call light and tray table in reach RN aware.  Plan of Care: Will benefit from Occupational Therapy 4 times per week  Discharge Recommendations:  Equipment Will Continue to Assess for Equipment Needs. Post-acute therapy Discharge to a transitional care facility for continued skilled therapy services. Will continue to assess    Pt seen for OT tx. Pt demonstrated poor pain control, decreased balance and significantly decreased activity tolerance. Pt currently very limited in participation of ADLS, transfers and functional mobility. Recommend continued acute OT and possibly post-acute services prior to d/c home based on progress made in this setting.    See \"Rehab Therapy-Acute\" Patient Summary Report for complete documentation.   "

## 2017-11-29 NOTE — PROGRESS NOTES
Pt A&Ox4, reporting pain, medicated per mar.   Incision to anterior neck covered with dressing, CDI, C-Collar in place.   N/t in L arm with improvement since surgery, no new onset n/t.  + BS, + flatus, stool softeners provided.  POC discussed, no further needs at this time, call light within reach.

## 2017-11-29 NOTE — NON-PROVIDER
Neurosurgery Progress Note    Subjective:  Ambulatory with PT/OT  C/o some throat pain with exhaling  Doing spirometer q1h  Bond out, not passing gas yet  Requesting IV morphine, received Tramadol and Xanax last night  Pain improved from yesterday slightly, admits poor tolerance to pain  Nausea resolved   Denies new pain, numbness, weakness  Denies HA, chest pain, SOB, difficulty breathing, chills      Exam:  VSS  A&O x 4, NAD  No hoarseness of voice  Trachea midline, no difficulty swallowing- no coughing or choking while drinking water  No nuchal rigidity  NM: 5/5 deltoid, handgrip, intrinsics, 4/5 left deltoid, biceps, and triceps with breakaway , handgrip  Sensation intact and equal throughout all extremities  Pulmonary: non-labored breathing on room air, normal respiratory effort  No LE edema, erythema, cyanosis, clubbing  Calves non-tender to compression bilaterally  Incision CDI, no halo sign, some incisional edema without erythema or evidence of infection   C-collar being worn properly  Drain out       BP  Min: 133/77  Max: 151/79  Pulse  Av.3  Min: 84  Max: 92  Resp  Av  Min: 16  Max: 16  Temp  Av.3 °C (97.4 °F)  Min: 36.1 °C (97 °F)  Max: 36.7 °C (98 °F)  SpO2  Av.8 %  Min: 97 %  Max: 98 %    No Data Recorded        No results for input(s): SODIUM, POTASSIUM, CHLORIDE, CO2, GLUCOSE, BUN, CPKTOTAL in the last 72 hours.            Intake/Output       17 07 - 17 0659 17 07 - 17 0659       Total  Total       Intake    P.O.  --  200 200  --  -- --    P.O. -- 200 200 -- -- --    Other  50  -- 50  --  -- --    Medications (P.O./ Enteral Liquids) 50 -- 50 -- -- --    Total Intake 50 200 250 -- -- --       Output    Urine  --  -- --  --  -- --    Number of Times Voided -- 2 x 2 x -- -- --    Total Output -- -- -- -- -- --       Net I/O     50 200 250 -- -- --            Intake/Output Summary (Last 24 hours) at 17 9548  Last  data filed at 11/29/17 0644   Gross per 24 hour   Intake              250 ml   Output                0 ml   Net              250 ml            • morphine ER  15 mg Q12HRS   • alendronate  70 mg Q7 DAYS   • atorvastatin  20 mg Nightly   • enalapril  20 mg Q EVENING   • gabapentin  100 mg TID   • hydrOXYzine HCl  25 mg BID   • omeprazole  20 mg DAILY   • trazodone  100-150 mg QHS PRN   • MD ALERT...Do not administer NSAIDS or ASPIRIN unless ORDERED By Neurosurgery  1 Each PRN   • docusate sodium  100 mg BID   • senna-docusate  1 Tab Nightly   • senna-docusate  1 Tab Q24HRS PRN   • polyethylene glycol/lytes  1 Packet BID PRN   • magnesium hydroxide  30 mL QDAY PRN   • bisacodyl  10 mg Q24HRS PRN   • fleet  1 Each Once PRN   • NS   Continuous   • acetaminophen  500 mg Q6HRS PRN   • diphenhydrAMINE  25 mg Q6HRS PRN    Or   • diphenhydrAMINE  25 mg Q6HRS PRN   • ondansetron  4 mg Q4HRS PRN   • ondansetron  4 mg Q4HRS PRN   • zolpidem  5 mg HS PRN - MR X 1   • clonidine  0.1 mg Q4HRS PRN   • hydrALAZINE  10 mg Q HOUR PRN   • labetalol  10 mg Q HOUR PRN   • vitamin D  5,000 Units DAILY   • calcium carbonate  500 mg BID   • benzocaine-menthol  1 Lozenge Q2HRS PRN   • morphine injection  2 mg Q4HRS PRN   • pneumococcal 13-Luisa Conj Vacc  0.5 mL Once PRN   • tramadol  100 mg Q6HRS PRN   • cyclobenzaprine  10 mg TID PRN   • alprazolam  0.5 mg TID PRN       Assessment and Plan:  POD #2  CERVICAL DISK AND FUSION ANTERIOR C4-7 WITH PLATE  Encourage to ambulate and work with therapies  Continue incentive spirometry Q1H  Continue pain control with tramadol, tylenol, flexeril   Continue stool softeners to encourage BM  OK to medicate for nausea with zofran, phenergan, compazine.   Will order home health     Plan to d/c tomorrow pending improved pain control and wean off oxygen  Patient agrees with treatment plan.   Case discussed with Dr. Back.

## 2017-11-29 NOTE — CARE PLAN
Problem: Knowledge Deficit  Goal: Knowledge of the prescribed therapeutic regimen will improve  RN educated pt on importance of identifying an effective oral pain medication that will manage her pain level when she is discharged. Pt verbalized understanding.    Problem: Pain Management  Goal: Pain level will decrease to patient's comfort goal  Routine pain assessment performed and appropriate pain management interventions implemented.

## 2017-11-29 NOTE — PROGRESS NOTES
Notified  office  That patient states pain is not control and more pain in right arm up to neck and anterior are of neck and ant incision. No abnormal edema, swallowing but complains of hard to breath. On  2 LPM NC running 97%. /87, 91 Hr, RR 20 even and unlabored. Lungs diminished. No chest pain reported per patient. Patient had morphine, flexeril, had xanax for anxiety. Patient receiving schedule morphine PO.

## 2017-11-29 NOTE — DISCHARGE PLANNING
Medical Social Worker    Per most recent Neuro APRN note, pt will have HH ordered. Once order and FTF for HH have been completed, SW will obtain CHOICE and have referrals sent out.

## 2017-11-29 NOTE — CARE PLAN
Problem: Safety  Goal: Will remain free from falls  Outcome: PROGRESSING AS EXPECTED  Bed alarm refused, pt A&Ox4, call light within reach and used appropriately.     Problem: Pain Management  Goal: Pain level will decrease to patient's comfort goal  Outcome: PROGRESSING AS EXPECTED  Tramadol q6hr and flexeril q8hr controlling pain well so far.

## 2017-11-29 NOTE — FACE TO FACE
Face to Face Supporting Documentation - Home Health    The encounter with this patient was in whole or in part the primary reason for home health admission.    Date of encounter:   Patient:                    MRN:                       YOB: 2015  Karen Dimas  [id number]  1950     Home health to see patient for:  Skilled Nursing care for assessment, interventions & education, Home health aide, Physical Therapy evaluation and treatment and Occupational therapy evaluation and treatment    Skilled need for:  Surgical Aftercare cervical fusion    Skilled nursing interventions to include:  Wound Care    Homebound status evidenced by:  Need the aid of supportive devices such as crutches, canes, wheelchairs or walkers. Leaving home requires a considerable and taxing effort. There is a normal inability to leave the home.    Community Physician to provide follow up care: IAN Oliva     Optional Interventions? No      I certify the face to face encounter for this home health care referral meets the CMS requirements and the encounter/clinical assessment with the patient was, in whole, or in part, for the medical condition(s) listed above, which is the primary reason for home health care. Based on my clinical findings: the service(s) are medically necessary, support the need for home health care, and the homebound criteria are met.  I certify that this patient has had a face to face encounter by myself.  Elizabeth Donaldson P.A.-C. - NPI: [id number]

## 2017-11-30 VITALS
SYSTOLIC BLOOD PRESSURE: 107 MMHG | RESPIRATION RATE: 15 BRPM | HEART RATE: 96 BPM | HEIGHT: 62 IN | DIASTOLIC BLOOD PRESSURE: 83 MMHG | TEMPERATURE: 97.6 F | BODY MASS INDEX: 29.01 KG/M2 | OXYGEN SATURATION: 96 % | WEIGHT: 157.63 LBS

## 2017-11-30 PROCEDURE — G8989 SELF CARE D/C STATUS: HCPCS | Mod: CJ

## 2017-11-30 PROCEDURE — 97535 SELF CARE MNGMENT TRAINING: CPT

## 2017-11-30 PROCEDURE — A9270 NON-COVERED ITEM OR SERVICE: HCPCS | Performed by: PHYSICIAN ASSISTANT

## 2017-11-30 PROCEDURE — 97530 THERAPEUTIC ACTIVITIES: CPT

## 2017-11-30 PROCEDURE — G8988 SELF CARE GOAL STATUS: HCPCS | Mod: CI

## 2017-11-30 PROCEDURE — 700111 HCHG RX REV CODE 636 W/ 250 OVERRIDE (IP): Performed by: PHYSICIAN ASSISTANT

## 2017-11-30 PROCEDURE — 90471 IMMUNIZATION ADMIN: CPT

## 2017-11-30 PROCEDURE — 90670 PCV13 VACCINE IM: CPT | Performed by: NEUROLOGICAL SURGERY

## 2017-11-30 PROCEDURE — 97116 GAIT TRAINING THERAPY: CPT

## 2017-11-30 PROCEDURE — 700102 HCHG RX REV CODE 250 W/ 637 OVERRIDE(OP): Performed by: PHYSICIAN ASSISTANT

## 2017-11-30 PROCEDURE — 700111 HCHG RX REV CODE 636 W/ 250 OVERRIDE (IP): Performed by: NEUROLOGICAL SURGERY

## 2017-11-30 PROCEDURE — 3E0234Z INTRODUCTION OF SERUM, TOXOID AND VACCINE INTO MUSCLE, PERCUTANEOUS APPROACH: ICD-10-PCS | Performed by: NEUROLOGICAL SURGERY

## 2017-11-30 PROCEDURE — 700112 HCHG RX REV CODE 229: Performed by: PHYSICIAN ASSISTANT

## 2017-11-30 RX ORDER — CYCLOBENZAPRINE HCL 10 MG
10 TABLET ORAL 3 TIMES DAILY
Qty: 90 TAB | Refills: 0 | Status: SHIPPED | OUTPATIENT
Start: 2017-11-30 | End: 2019-09-10

## 2017-11-30 RX ORDER — METHYLPREDNISOLONE 4 MG/1
TABLET ORAL
Qty: 1 KIT | Refills: 0 | Status: SHIPPED
Start: 2017-11-30 | End: 2017-12-05

## 2017-11-30 RX ORDER — MORPHINE SULFATE 15 MG/1
15 TABLET, FILM COATED, EXTENDED RELEASE ORAL EVERY 12 HOURS
Qty: 17 TAB | Refills: 0 | Status: SHIPPED | OUTPATIENT
Start: 2017-11-30 | End: 2019-09-10

## 2017-11-30 RX ADMIN — MAGNESIUM HYDROXIDE 30 ML: 400 SUSPENSION ORAL at 09:25

## 2017-11-30 RX ADMIN — GABAPENTIN 100 MG: 100 CAPSULE ORAL at 06:43

## 2017-11-30 RX ADMIN — ENOXAPARIN SODIUM 40 MG: 100 INJECTION SUBCUTANEOUS at 09:23

## 2017-11-30 RX ADMIN — CYCLOBENZAPRINE 10 MG: 10 TABLET, FILM COATED ORAL at 09:23

## 2017-11-30 RX ADMIN — ANTACID TABLETS 500 MG: 500 TABLET, CHEWABLE ORAL at 09:23

## 2017-11-30 RX ADMIN — DEXAMETHASONE SODIUM PHOSPHATE 4 MG: 4 INJECTION, SOLUTION INTRAMUSCULAR; INTRAVENOUS at 01:42

## 2017-11-30 RX ADMIN — MORPHINE SULFATE 15 MG: 15 TABLET, EXTENDED RELEASE ORAL at 09:22

## 2017-11-30 RX ADMIN — DOCUSATE SODIUM 100 MG: 100 CAPSULE ORAL at 09:23

## 2017-11-30 RX ADMIN — DIPHENHYDRAMINE HCL 25 MG: 25 TABLET ORAL at 01:54

## 2017-11-30 RX ADMIN — POLYETHYLENE GLYCOL 3350 1 PACKET: 17 POWDER, FOR SOLUTION ORAL at 09:25

## 2017-11-30 RX ADMIN — OMEPRAZOLE 20 MG: 20 CAPSULE, DELAYED RELEASE ORAL at 09:23

## 2017-11-30 RX ADMIN — PNEUMOCOCCAL 13-VALENT CONJUGATE VACCINE 0.5 ML: 2.2; 2.2; 2.2; 2.2; 2.2; 4.4; 2.2; 2.2; 2.2; 2.2; 2.2; 2.2; 2.2 INJECTION, SUSPENSION INTRAMUSCULAR at 09:34

## 2017-11-30 RX ADMIN — DEXAMETHASONE SODIUM PHOSPHATE 4 MG: 4 INJECTION, SOLUTION INTRAMUSCULAR; INTRAVENOUS at 06:43

## 2017-11-30 RX ADMIN — VITAMIN D, TAB 1000IU (100/BT) 5000 UNITS: 25 TAB at 09:23

## 2017-11-30 RX ADMIN — TRAMADOL HYDROCHLORIDE 100 MG: 50 TABLET, COATED ORAL at 01:42

## 2017-11-30 RX ADMIN — HYDROXYZINE HYDROCHLORIDE 25 MG: 25 TABLET ORAL at 09:23

## 2017-11-30 ASSESSMENT — GAIT ASSESSMENTS
GAIT LEVEL OF ASSIST: SUPERVISED
DISTANCE (FEET): 150
DEVIATION: BRADYKINETIC;SHUFFLED GAIT
ASSISTIVE DEVICE: FRONT WHEEL WALKER

## 2017-11-30 ASSESSMENT — PAIN SCALES - GENERAL: PAINLEVEL_OUTOF10: 5

## 2017-11-30 ASSESSMENT — LIFESTYLE VARIABLES: EVER_SMOKED: YES

## 2017-11-30 NOTE — THERAPY
"Occupational Therapy Treatment completed with focus on ADLs, patient education and caregiver training.  Functional Status:  Pt declined OOB activity but requested education w/family re: collar and spinal prec.Pt sister was present reviewed don/doff of collar, changing of pads and postural changes to assist w/pain management. Review packet and answered questions re: ADL's. Both reported no further questions or concerns, preparing for d/c   Plan of Care: Patient with no further skilled OT needs in the acute care setting at this time  Discharge Recommendations:  Equipment No Equipment Needed. Post-acute therapy Discharge to home with outpatient or home health for additional skilled therapy services.    Pt d/c'd after tx     See \"Rehab Therapy-Acute\" Patient Summary Report for complete documentation.   "

## 2017-11-30 NOTE — DISCHARGE INSTRUCTIONS
Discharge Instructions    Discharged to home by car with relative. Discharged via wheelchair, hospital escort: Yes.  Special equipment needed: C-Collar    Be sure to schedule a follow-up appointment with your primary care doctor or any specialists as instructed. Notified  office if any abnormal bleeding, foul drainage. Any questions call the office.       Discharge Plan:   Diet Plan: Discussed  Activity Level: Discussed  Smoking Cessation Offered: Patient Refused  Pneumococcal Vaccine Given - only chart on this line when given: Given (See MAR)  Influenza Vaccine Indication: Not indicated: Previously immunized this influenza season and > 8 years of age    I understand that a diet low in cholesterol, fat, and sodium is recommended for good health. Unless I have been given specific instructions below for another diet, I accept this instruction as my diet prescription.   Other diet: Regular    Special Instructions: None    · Is patient discharged on Warfarin / Coumadin?   No     · Is patient Post Blood Transfusion?  No    Depression / Suicide Risk    As you are discharged from this [facility] facility, it is important to learn how to keep safe from harming yourself. Notified  office if any abnormal bleeding, foul drainage. Any questions call the office.     Recognize the warning signs:  · Abrupt changes in personality, positive or negative- including increase in energy   · Giving away possessions  · Change in eating patterns- significant weight changes-  positive or negative  · Change in sleeping patterns- unable to sleep or sleeping all the time   · Unwillingness or inability to communicate  · Depression  · Unusual sadness, discouragement and loneliness  · Talk of wanting to die  · Neglect of personal appearance   · Rebelliousness- reckless behavior  · Withdrawal from people/activities they love  · Confusion- inability to concentrate     If you or a loved one observes any of these behaviors or has  concerns about self-harm, here's what you can do:  · Talk about it- your feelings and reasons for harming yourself  · Remove any means that you might use to hurt yourself (examples: pills, rope, extension cords, firearm)  · Get professional help from the community (Mental Health, Substance Abuse, psychological counseling)  · Do not be alone:Call your Safe Contact- someone whom you trust who will be there for you.  · Call your local CRISIS HOTLINE 892-3974 or 169-069-2371  · Call your local Children's Mobile Crisis Response Team [place] (718) 527-5042 or www.[employer]Stage  · Call the toll free National Suicide Prevention Hotlines   · National Suicide Prevention Lifeline 579-929-WNTL (8006)  · National Hope Line Network 800-SUICIDE (272-5195)

## 2017-11-30 NOTE — PROGRESS NOTES
Patient states pain better than yesterday. Pain 3 at this time. Patient walked 200 ft with rolling walker.Voiding well. Standby assist to get out and in bed.

## 2017-11-30 NOTE — DISCHARGE PLANNING
Medical Social Worker    VISHAL got CHOICE for HH. 1-[facility]  2- [facility]  3- [facility]  4- [facility]     VISHAL faxed CHOICE to [facility].

## 2017-11-30 NOTE — CARE PLAN
Problem: Pain Management  Goal: Pain level will decrease to patient's comfort goal  Outcome: PROGRESSING AS EXPECTED  Verbalizes understanding about alternating pain medication and muscle relaxants. New order for Valium for muscle pain and anxiety and not to be given with xanax.

## 2017-11-30 NOTE — CARE PLAN
Problem: Pain Management  Goal: Pain level will decrease to patient's comfort goal  Verbalizes understanding about alternating pain medication and muscle relaxants. New order for Valium for muscle pain and anxiety and not to be given with xanax.

## 2017-11-30 NOTE — NON-PROVIDER
Neurosurgery Progress Note    Subjective:  Ambulatory with PT/OT  Doing spirometer q1h  Voiding, passing gas  UE pain resolved   No BM yet   No nausea  Pain well controlled on PO medication   Denies new pain, numbness, weakness  Denies HA, chest pain, SOB, difficulty breathing, chills      Exam:  VSS  A&O x 4, NAD  No hoarseness of voice  Trachea midline, no difficulty swallowing- no coughing or choking while drinking water  No nuchal rigidity  NM: 5/5 deltoid, handgrip, intrinsics, deltoid, handgrip  4+/5 leftbiceps, and triceps   Sensation intact and equal throughout all extremities  Pulmonary: non-labored breathing on room air, normal respiratory effort  No LE edema, erythema, cyanosis, clubbing  Calves non-tender to compression bilaterally  Incision CDI, no halo sign, some incisional edema without erythema or evidence of infection   C-collar being worn properly  Drain out       BP  Min: 105/51  Max: 150/82  Pulse  Av.3  Min: 82  Max: 96  Resp  Av.8  Min: 15  Max: 20  Temp  Av.2 °C (97.2 °F)  Min: 35.9 °C (96.6 °F)  Max: 36.6 °C (97.9 °F)  SpO2  Av.3 %  Min: 90 %  Max: 96 %    No Data Recorded        No results for input(s): SODIUM, POTASSIUM, CHLORIDE, CO2, GLUCOSE, BUN, CPKTOTAL in the last 72 hours.            Intake/Output       17 0700 - 1759 17 0700 - 17 0659       Total  Total       Intake    Total Intake -- -- -- -- -- --       Output    Urine  --  -- --  --  -- --    Number of Times Voided -- 3 x 3 x 1 x -- 1 x    Total Output -- -- -- -- -- --       Net I/O     -- -- -- -- -- --          No intake or output data in the 24 hours ending 17         • pneumococcal 13-Luisa Conj Vacc  0.5 mL Once   • enoxaparin (LOVENOX) injection  40 mg Q12HRS   • cyclobenzaprine  10 mg TID   • dexamethasone  4 mg Q6HRS   • diazepam  5 mg Q8HRS PRN   • morphine ER  15 mg Q12HRS   • alendronate  70 mg Q7 DAYS   • atorvastatin  20 mg  Nightly   • enalapril  20 mg Q EVENING   • gabapentin  100 mg TID   • hydrOXYzine HCl  25 mg BID   • omeprazole  20 mg DAILY   • trazodone  100-150 mg QHS PRN   • MD ALERT...Do not administer NSAIDS or ASPIRIN unless ORDERED By Neurosurgery  1 Each PRN   • docusate sodium  100 mg BID   • senna-docusate  1 Tab Nightly   • senna-docusate  1 Tab Q24HRS PRN   • polyethylene glycol/lytes  1 Packet BID PRN   • magnesium hydroxide  30 mL QDAY PRN   • bisacodyl  10 mg Q24HRS PRN   • fleet  1 Each Once PRN   • NS   Continuous   • acetaminophen  500 mg Q6HRS PRN   • diphenhydrAMINE  25 mg Q6HRS PRN    Or   • diphenhydrAMINE  25 mg Q6HRS PRN   • ondansetron  4 mg Q4HRS PRN   • ondansetron  4 mg Q4HRS PRN   • zolpidem  5 mg HS PRN - MR X 1   • clonidine  0.1 mg Q4HRS PRN   • hydrALAZINE  10 mg Q HOUR PRN   • labetalol  10 mg Q HOUR PRN   • vitamin D  5,000 Units DAILY   • calcium carbonate  500 mg BID   • benzocaine-menthol  1 Lozenge Q2HRS PRN   • morphine injection  2 mg Q4HRS PRN   • tramadol  100 mg Q6HRS PRN       Assessment and Plan:  POD #3  CERVICAL DISK AND FUSION ANTERIOR C4-7 WITH PLATE  Encourage to ambulate and work with therapies  Continue incentive spirometry Q1H  Continue pain control with tramadol, tylenol, flexeril   MOM and suppository today  Please redress incision prior to d/c  Would like to shower prior to d/c   Home health/walker ordered  Plan to d/c tomorrow pending improved pain control and wean off oxygen  Patient agrees with treatment plan.   Case discussed with Dr. Back.

## 2017-11-30 NOTE — DISCHARGE PLANNING
Received choice form from K[facility](VISHAL). Referral sent to pts first choice, [facility].

## 2017-11-30 NOTE — DISCHARGE PLANNING
Medical Social Work    Per pts chart, pt needs a DME referral. SW met with pt at bedside who requested that referrals be sent to [facility]. SW addressed all questions and encouraged follow up as needed. SW sent the choice form to [facility] and confirmed receipt of choice form. SW to monitor response status and follow up with care team.

## 2017-11-30 NOTE — CARE PLAN
Problem: Safety  Goal: Will remain free from injury  Outcome: PROGRESSING AS EXPECTED  Bed alarm on, educated patient about needing bed alarm related to pain medication therapy and weakness and cervical collar, pt A&Ox4, call light within reach and used appropriately

## 2017-11-30 NOTE — DISCHARGE PLANNING
Followed up with [facility]. Per Dulce Maria, they are verifying patient's PCP. Monika(VISHAL) updated.

## 2017-12-01 ENCOUNTER — HOME CARE VISIT (OUTPATIENT)
Dept: HOME HEALTH SERVICES | Facility: HOME HEALTHCARE | Age: 67
End: 2017-12-01
Payer: MEDICARE

## 2017-12-01 PROCEDURE — 665998 HH PPS REVENUE CREDIT

## 2017-12-01 PROCEDURE — 665999 HH PPS REVENUE DEBIT

## 2017-12-01 PROCEDURE — G0162 HHC RN E&M PLAN SVS, 15 MIN: HCPCS

## 2017-12-01 PROCEDURE — 665001 SOC-HOME HEALTH

## 2017-12-01 NOTE — DISCHARGE SUMMARY
DATE OF ADMISSION:  11/27/2017    DATE OF DISCHARGE:  11/30/2017    ADMITTING DIAGNOSES:  C4-C7 spinal cord compression with symptomatic radicular   myelopathy.    DISCHARGE DIAGNOSES:  C4-C7 spinal cord compression with symptomatic radicular   myelopathy.    PRINCIPAL PROCEDURE PERFORMED:  Right-sided approach for C4-C7 anterior   cervical diskectomy and interbody fusion with placement of 3 titanium Truss   interbody cage by 4-web and anterior cervical plate made by [employer].    PROCEDURE PERFORMED BY:  Prem Back MD    ASSISTANT:  Elizabeth Donaldson PA-C    COMPLICATIONS:  There were no complications.    HOSPITAL COURSE:  Patient was extubated and brought to recovery room and then   to the neurosurgery floor.  On postop day 1, the patient had poor pain   control, knowing that she had increase pain in her right and left upper   extremity.  She stated that she also had dysphagia and difficulty swallowing;   however, the patient was able to demonstrate that she could swallow water   without coughing or choking.  The patient was not cleared by physical therapy   or occupational therapy for discharge to home secondary to limited evaluation   with pain control.  On postop day #2, the patient stated that her pain has   improved, although she did continue to complain of some difficulty with   swallowing.  She stated that she had increased pain in her throat particularly   with ____ phase.  Her Bond was out.  She was not passing gas.  She was   ambulatory to the bathroom with some nausea and dizziness, which was   controlled with Zofran and Phenergan.  Ultimately, the patient's medications   were changed and she was switched from Norco and Percocet to MS Contin 15 mg   b.i.d. with Flexeril.  The patient responded well to parenteral medications as   well as continued use of Decadron.  On postop day #3, this patient stated   that her pain was very well controlled on oral medication.  She was   ambulatory, cleared by  physical therapy and occupational therapy for safe   discharge to home.  She was voiding.  She is passing gas; however, she did not   have a bowel movement.  Patient was discharged to home in stable condition   with the following physical exam.    PHYSICAL EXAMINATION:  VITAL SIGNS:  Stable.  GENERAL: Alert and oriented x4.  No apparent distress.  HEENT:  No hoarseness of voice.  Trachea midline.  No difficulty swallowing.    Not cough or choke when drinking water.  No nuchal rigidity.  NEUROMUSCULAR:  A 5/5 deltoid, ____ 4+/5, left biceps and triceps.  Sensation   intact and equal throughout all 4 extremities.  PULMONARY:  Nonlabored breathing on room air, normal respiratory effort.  LOWER EXTREMITIES:  No lower extremity edema, erythema, cyanosis or clubbing.    Calves nontender to compression bilaterally.  Incision is clean, dry, and   intact.  There is some incisional edema and ecchymosis.  NECK:  C-collar being worn appropriately.    DISCHARGE MEDICATIONS:  Patient will be discharged on the following   medications, MS Contin 15 mg, take 1 p.o. b.i.d. x5 days, then 1 p.o. at   bedtime x7 days, then stop, Flexeril 10 mg to take 1 p.o. t.i.d., milk of   magnesia take 30 mg b.i.d. p.r.n. constipation, hold for loose stools.    DISCHARGE INSTRUCTIONS:  Patient's instructions:  Ambulate often to prevent   DVT.  Continue incentive spirometer hourly, daily over-the-counter stool   softener while on narcotic medication, no driving for 6 weeks, no driving   while on hard cervical collar.  No NSAIDs, anticoagulants, aspirin for 10   days.  Please call office if patient does not have a bowel movement by end of   the each day.  Patient will be discharged to home with home health as well as   a walker.  Please follow up with advanced neurosurgery in 2 weeks.  Please   call office to confirm date, time and location of having appointment made.       ____________________________________     LUCRETIA Gramajo /  ANGEL    DD:  11/30/2017 09:23:41  DT:  12/01/2017 05:20:55    D#:  [id number]  Job#:  [id number]

## 2017-12-02 VITALS
OXYGEN SATURATION: 94 % | SYSTOLIC BLOOD PRESSURE: 112 MMHG | HEIGHT: 62 IN | RESPIRATION RATE: 16 BRPM | WEIGHT: 160 LBS | HEART RATE: 89 BPM | DIASTOLIC BLOOD PRESSURE: 62 MMHG | BODY MASS INDEX: 29.44 KG/M2 | TEMPERATURE: 97.8 F

## 2017-12-02 PROCEDURE — 665998 HH PPS REVENUE CREDIT

## 2017-12-02 PROCEDURE — 665999 HH PPS REVENUE DEBIT

## 2017-12-02 SDOH — ECONOMIC STABILITY: HOUSING INSECURITY: UNSAFE COOKING RANGE AREA: 0

## 2017-12-02 SDOH — ECONOMIC STABILITY: HOUSING INSECURITY: UNSAFE APPLIANCES: 0

## 2017-12-02 SDOH — ECONOMIC STABILITY: HOUSING INSECURITY
HOME SAFETY: PT HAS SEVERAL THROW RUGS, NO SHOWER BARS OR SHOWER CHAIR THAT COULD CREATE A POTENTIAL RISK AS A TRIP/SLIP HAZARD.

## 2017-12-02 ASSESSMENT — ENCOUNTER SYMPTOMS
VOMITING: DENIES
DEPRESSED MOOD: 1
NAUSEA: DENIES

## 2017-12-02 ASSESSMENT — ACTIVITIES OF DAILY LIVING (ADL): HOME_HEALTH_OASIS: 01

## 2017-12-02 ASSESSMENT — PATIENT HEALTH QUESTIONNAIRE - PHQ9
1. LITTLE INTEREST OR PLEASURE IN DOING THINGS: 00
2. FEELING DOWN, DEPRESSED, IRRITABLE, OR HOPELESS: 03

## 2017-12-03 PROCEDURE — 665998 HH PPS REVENUE CREDIT

## 2017-12-03 PROCEDURE — 665999 HH PPS REVENUE DEBIT

## 2017-12-04 ENCOUNTER — HOME CARE VISIT (OUTPATIENT)
Dept: HOME HEALTH SERVICES | Facility: HOME HEALTHCARE | Age: 67
End: 2017-12-04
Payer: MEDICARE

## 2017-12-04 VITALS
DIASTOLIC BLOOD PRESSURE: 70 MMHG | RESPIRATION RATE: 18 BRPM | OXYGEN SATURATION: 98 % | TEMPERATURE: 97.9 F | SYSTOLIC BLOOD PRESSURE: 120 MMHG | HEART RATE: 89 BPM

## 2017-12-04 PROCEDURE — 665999 HH PPS REVENUE DEBIT

## 2017-12-04 PROCEDURE — G0299 HHS/HOSPICE OF RN EA 15 MIN: HCPCS

## 2017-12-04 PROCEDURE — 665998 HH PPS REVENUE CREDIT

## 2017-12-04 SDOH — ECONOMIC STABILITY: HOUSING INSECURITY: UNSAFE COOKING RANGE AREA: 0

## 2017-12-04 SDOH — ECONOMIC STABILITY: HOUSING INSECURITY: UNSAFE APPLIANCES: 0

## 2017-12-04 ASSESSMENT — ENCOUNTER SYMPTOMS
NAUSEA: DENIES
VOMITING: DENIES

## 2017-12-05 ENCOUNTER — TELEPHONE (OUTPATIENT)
Dept: VASCULAR LAB | Facility: MEDICAL CENTER | Age: 67
End: 2017-12-05

## 2017-12-05 PROCEDURE — 665999 HH PPS REVENUE DEBIT

## 2017-12-05 PROCEDURE — 665998 HH PPS REVENUE CREDIT

## 2017-12-06 ENCOUNTER — HOME CARE VISIT (OUTPATIENT)
Dept: HOME HEALTH SERVICES | Facility: HOME HEALTHCARE | Age: 67
End: 2017-12-06
Payer: MEDICARE

## 2017-12-06 PROCEDURE — 665999 HH PPS REVENUE DEBIT

## 2017-12-06 PROCEDURE — G0151 HHCP-SERV OF PT,EA 15 MIN: HCPCS

## 2017-12-06 PROCEDURE — 665998 HH PPS REVENUE CREDIT

## 2017-12-07 ENCOUNTER — HOME CARE VISIT (OUTPATIENT)
Dept: HOME HEALTH SERVICES | Facility: HOME HEALTHCARE | Age: 67
End: 2017-12-07
Payer: MEDICARE

## 2017-12-07 VITALS
TEMPERATURE: 97.6 F | HEART RATE: 78 BPM | SYSTOLIC BLOOD PRESSURE: 110 MMHG | DIASTOLIC BLOOD PRESSURE: 60 MMHG | OXYGEN SATURATION: 97 % | RESPIRATION RATE: 18 BRPM

## 2017-12-07 VITALS
DIASTOLIC BLOOD PRESSURE: 82 MMHG | RESPIRATION RATE: 18 BRPM | OXYGEN SATURATION: 96 % | SYSTOLIC BLOOD PRESSURE: 118 MMHG | TEMPERATURE: 97.9 F | HEART RATE: 104 BPM

## 2017-12-07 PROCEDURE — 665998 HH PPS REVENUE CREDIT

## 2017-12-07 PROCEDURE — 665999 HH PPS REVENUE DEBIT

## 2017-12-07 PROCEDURE — G0495 RN CARE TRAIN/EDU IN HH: HCPCS

## 2017-12-07 SDOH — ECONOMIC STABILITY: HOUSING INSECURITY: UNSAFE APPLIANCES: 0

## 2017-12-07 SDOH — ECONOMIC STABILITY: HOUSING INSECURITY: UNSAFE COOKING RANGE AREA: 0

## 2017-12-07 ASSESSMENT — ENCOUNTER SYMPTOMS
NAUSEA: DENIED
MENTAL STATUS CHANGE: 0
VOMITING: DENIED

## 2017-12-07 ASSESSMENT — ACTIVITIES OF DAILY LIVING (ADL): IADLS_COMMENTS: <!--EPICS-->REQUIRES ASSIST WITH ALL IADLS<!--EPICE-->

## 2017-12-08 ENCOUNTER — HOME CARE VISIT (OUTPATIENT)
Dept: HOME HEALTH SERVICES | Facility: HOME HEALTHCARE | Age: 67
End: 2017-12-08
Payer: MEDICARE

## 2017-12-08 PROCEDURE — G0155 HHCP-SVS OF CSW,EA 15 MIN: HCPCS

## 2017-12-08 PROCEDURE — 665998 HH PPS REVENUE CREDIT

## 2017-12-08 PROCEDURE — 665999 HH PPS REVENUE DEBIT

## 2017-12-09 PROCEDURE — 665999 HH PPS REVENUE DEBIT

## 2017-12-09 PROCEDURE — 665998 HH PPS REVENUE CREDIT

## 2017-12-10 PROCEDURE — 665999 HH PPS REVENUE DEBIT

## 2017-12-10 PROCEDURE — 665998 HH PPS REVENUE CREDIT

## 2017-12-11 ENCOUNTER — HOME CARE VISIT (OUTPATIENT)
Dept: HOME HEALTH SERVICES | Facility: HOME HEALTHCARE | Age: 67
End: 2017-12-11
Payer: MEDICARE

## 2017-12-11 VITALS
DIASTOLIC BLOOD PRESSURE: 77 MMHG | HEART RATE: 93 BPM | OXYGEN SATURATION: 99 % | TEMPERATURE: 98.6 F | SYSTOLIC BLOOD PRESSURE: 118 MMHG | RESPIRATION RATE: 18 BRPM

## 2017-12-11 PROCEDURE — 665999 HH PPS REVENUE DEBIT

## 2017-12-11 PROCEDURE — 665998 HH PPS REVENUE CREDIT

## 2017-12-11 PROCEDURE — G0152 HHCP-SERV OF OT,EA 15 MIN: HCPCS

## 2017-12-11 ASSESSMENT — ENCOUNTER SYMPTOMS: DEBILITATING PAIN: 1

## 2017-12-11 ASSESSMENT — ACTIVITIES OF DAILY LIVING (ADL): OASIS_M1830: 05

## 2017-12-12 ENCOUNTER — HOME CARE VISIT (OUTPATIENT)
Dept: HOME HEALTH SERVICES | Facility: HOME HEALTHCARE | Age: 67
End: 2017-12-12
Payer: MEDICARE

## 2017-12-12 VITALS
OXYGEN SATURATION: 97 % | TEMPERATURE: 98.5 F | DIASTOLIC BLOOD PRESSURE: 60 MMHG | HEART RATE: 91 BPM | SYSTOLIC BLOOD PRESSURE: 96 MMHG

## 2017-12-12 PROCEDURE — 665999 HH PPS REVENUE DEBIT

## 2017-12-12 PROCEDURE — G0496 LPN CARE TRAIN/EDU IN HH: HCPCS

## 2017-12-12 PROCEDURE — 665998 HH PPS REVENUE CREDIT

## 2017-12-12 ASSESSMENT — ENCOUNTER SYMPTOMS: DEBILITATING PAIN: 1

## 2017-12-13 ENCOUNTER — HOME CARE VISIT (OUTPATIENT)
Dept: HOME HEALTH SERVICES | Facility: HOME HEALTHCARE | Age: 67
End: 2017-12-13
Payer: MEDICARE

## 2017-12-13 VITALS
OXYGEN SATURATION: 98 % | RESPIRATION RATE: 18 BRPM | SYSTOLIC BLOOD PRESSURE: 100 MMHG | HEART RATE: 98 BPM | DIASTOLIC BLOOD PRESSURE: 68 MMHG | TEMPERATURE: 98.6 F

## 2017-12-13 PROCEDURE — 665998 HH PPS REVENUE CREDIT

## 2017-12-13 PROCEDURE — 665999 HH PPS REVENUE DEBIT

## 2017-12-13 PROCEDURE — G0152 HHCP-SERV OF OT,EA 15 MIN: HCPCS

## 2017-12-13 ASSESSMENT — ENCOUNTER SYMPTOMS: DEBILITATING PAIN: 1

## 2017-12-14 ENCOUNTER — HOME CARE VISIT (OUTPATIENT)
Dept: HOME HEALTH SERVICES | Facility: HOME HEALTHCARE | Age: 67
End: 2017-12-14
Payer: MEDICARE

## 2017-12-14 VITALS
DIASTOLIC BLOOD PRESSURE: 58 MMHG | OXYGEN SATURATION: 97 % | SYSTOLIC BLOOD PRESSURE: 100 MMHG | RESPIRATION RATE: 18 BRPM | HEART RATE: 95 BPM | TEMPERATURE: 99.6 F

## 2017-12-14 VITALS
OXYGEN SATURATION: 97 % | HEART RATE: 96 BPM | TEMPERATURE: 99.2 F | SYSTOLIC BLOOD PRESSURE: 104 MMHG | RESPIRATION RATE: 18 BRPM | DIASTOLIC BLOOD PRESSURE: 60 MMHG

## 2017-12-14 PROCEDURE — G0495 RN CARE TRAIN/EDU IN HH: HCPCS

## 2017-12-14 PROCEDURE — 665999 HH PPS REVENUE DEBIT

## 2017-12-14 PROCEDURE — 665998 HH PPS REVENUE CREDIT

## 2017-12-14 PROCEDURE — G0151 HHCP-SERV OF PT,EA 15 MIN: HCPCS

## 2017-12-14 SDOH — ECONOMIC STABILITY: HOUSING INSECURITY: UNSAFE COOKING RANGE AREA: 0

## 2017-12-14 SDOH — ECONOMIC STABILITY: HOUSING INSECURITY: UNSAFE APPLIANCES: 0

## 2017-12-14 ASSESSMENT — ENCOUNTER SYMPTOMS: RESPIRATORY SYMPTOMS COMMENTS: DENIES BREATHING PROBLEM

## 2017-12-15 ENCOUNTER — HOME CARE VISIT (OUTPATIENT)
Dept: HOME HEALTH SERVICES | Facility: HOME HEALTHCARE | Age: 67
End: 2017-12-15
Payer: MEDICARE

## 2017-12-15 VITALS
DIASTOLIC BLOOD PRESSURE: 70 MMHG | HEART RATE: 93 BPM | TEMPERATURE: 97.8 F | RESPIRATION RATE: 16 BRPM | SYSTOLIC BLOOD PRESSURE: 100 MMHG

## 2017-12-15 PROCEDURE — G0153 HHCP-SVS OF S/L PATH,EA 15MN: HCPCS

## 2017-12-15 PROCEDURE — 665999 HH PPS REVENUE DEBIT

## 2017-12-15 PROCEDURE — 665998 HH PPS REVENUE CREDIT

## 2017-12-15 SDOH — ECONOMIC STABILITY: HOUSING INSECURITY: UNSAFE COOKING RANGE AREA: 0

## 2017-12-15 SDOH — ECONOMIC STABILITY: HOUSING INSECURITY: UNSAFE APPLIANCES: 0

## 2017-12-16 PROCEDURE — 665998 HH PPS REVENUE CREDIT

## 2017-12-16 PROCEDURE — 665999 HH PPS REVENUE DEBIT

## 2017-12-17 PROCEDURE — 665998 HH PPS REVENUE CREDIT

## 2017-12-17 PROCEDURE — 665999 HH PPS REVENUE DEBIT

## 2017-12-18 ENCOUNTER — HOME CARE VISIT (OUTPATIENT)
Dept: HOME HEALTH SERVICES | Facility: HOME HEALTHCARE | Age: 67
End: 2017-12-18
Payer: MEDICARE

## 2017-12-18 VITALS
OXYGEN SATURATION: 98 % | SYSTOLIC BLOOD PRESSURE: 100 MMHG | RESPIRATION RATE: 18 BRPM | HEART RATE: 81 BPM | TEMPERATURE: 99.2 F | DIASTOLIC BLOOD PRESSURE: 60 MMHG

## 2017-12-18 PROCEDURE — 665999 HH PPS REVENUE DEBIT

## 2017-12-18 PROCEDURE — G0299 HHS/HOSPICE OF RN EA 15 MIN: HCPCS

## 2017-12-18 PROCEDURE — 665998 HH PPS REVENUE CREDIT

## 2017-12-18 SDOH — ECONOMIC STABILITY: HOUSING INSECURITY: UNSAFE COOKING RANGE AREA: 0

## 2017-12-18 SDOH — ECONOMIC STABILITY: HOUSING INSECURITY: UNSAFE APPLIANCES: 0

## 2017-12-18 ASSESSMENT — ENCOUNTER SYMPTOMS
NAUSEA: DENIES
VOMITING: DENIES

## 2017-12-19 ENCOUNTER — HOME CARE VISIT (OUTPATIENT)
Dept: HOME HEALTH SERVICES | Facility: HOME HEALTHCARE | Age: 67
End: 2017-12-19
Payer: MEDICARE

## 2017-12-19 VITALS
DIASTOLIC BLOOD PRESSURE: 80 MMHG | OXYGEN SATURATION: 98 % | HEART RATE: 85 BPM | SYSTOLIC BLOOD PRESSURE: 110 MMHG | RESPIRATION RATE: 18 BRPM | TEMPERATURE: 98.8 F

## 2017-12-19 PROCEDURE — 665999 HH PPS REVENUE DEBIT

## 2017-12-19 PROCEDURE — 665998 HH PPS REVENUE CREDIT

## 2017-12-19 PROCEDURE — G0152 HHCP-SERV OF OT,EA 15 MIN: HCPCS

## 2017-12-19 ASSESSMENT — ENCOUNTER SYMPTOMS: DEBILITATING PAIN: 1

## 2017-12-20 PROCEDURE — 665998 HH PPS REVENUE CREDIT

## 2017-12-20 PROCEDURE — 665999 HH PPS REVENUE DEBIT

## 2017-12-21 ENCOUNTER — HOME CARE VISIT (OUTPATIENT)
Dept: HOME HEALTH SERVICES | Facility: HOME HEALTHCARE | Age: 67
End: 2017-12-21
Payer: MEDICARE

## 2017-12-21 VITALS
DIASTOLIC BLOOD PRESSURE: 81 MMHG | TEMPERATURE: 98.4 F | HEART RATE: 87 BPM | SYSTOLIC BLOOD PRESSURE: 130 MMHG | OXYGEN SATURATION: 99 % | RESPIRATION RATE: 18 BRPM

## 2017-12-21 PROCEDURE — 665999 HH PPS REVENUE DEBIT

## 2017-12-21 PROCEDURE — 665998 HH PPS REVENUE CREDIT

## 2017-12-21 PROCEDURE — G0160 HHC OCCUP THERAPY EA 15: HCPCS

## 2017-12-21 PROCEDURE — 665997 HH PPS REVENUE ADJ

## 2017-12-21 ASSESSMENT — ACTIVITIES OF DAILY LIVING (ADL)
HOME_HEALTH_OASIS: 00
OASIS_M1830: 00
HOME_HEALTH_OASIS: 01

## 2017-12-21 ASSESSMENT — ENCOUNTER SYMPTOMS: DEBILITATING PAIN: 1

## 2017-12-22 PROCEDURE — 665998 HH PPS REVENUE CREDIT

## 2017-12-22 PROCEDURE — 665999 HH PPS REVENUE DEBIT

## 2017-12-23 PROCEDURE — 665998 HH PPS REVENUE CREDIT

## 2017-12-23 PROCEDURE — 665999 HH PPS REVENUE DEBIT

## 2017-12-24 PROCEDURE — 665999 HH PPS REVENUE DEBIT

## 2017-12-24 PROCEDURE — 665998 HH PPS REVENUE CREDIT

## 2017-12-25 PROCEDURE — 665999 HH PPS REVENUE DEBIT

## 2017-12-25 PROCEDURE — 665998 HH PPS REVENUE CREDIT

## 2017-12-26 PROCEDURE — 665999 HH PPS REVENUE DEBIT

## 2017-12-26 PROCEDURE — 665998 HH PPS REVENUE CREDIT

## 2018-03-14 NOTE — ADDENDUM NOTE
Encounter addended by: Pam Roman on: 3/14/2018  1:23 PM<BR>    Actions taken: Charge Capture section accepted

## 2019-08-16 NOTE — THERAPY
"Physical Therapy Treatment completed.   Bed Mobility:  Supine to Sit: Supervised  Transfers: Sit to Stand: Supervised  Gait: Level Of Assist: Supervised with Front-Wheel Walker       Plan of Care: Patient with no further skilled PT needs in the acute care setting at this time and Patient demonstrates safety with mobility in this environment at this time.   Discharge Recommendations: Equipment: Front-Wheel Walker. Post-acute therapy Discharge to home with outpatient or home health for additional skilled therapy services.     See \"Rehab Therapy-Acute\" Patient Summary Report for complete documentation.       " [address] E/M Level 3 Established Patient

## 2019-09-10 ENCOUNTER — HOSPITAL ENCOUNTER (EMERGENCY)
Facility: MEDICAL CENTER | Age: 69
End: 2019-09-10
Attending: EMERGENCY MEDICINE
Payer: COMMERCIAL

## 2019-09-10 ENCOUNTER — APPOINTMENT (OUTPATIENT)
Dept: RADIOLOGY | Facility: MEDICAL CENTER | Age: 69
End: 2019-09-10
Attending: EMERGENCY MEDICINE
Payer: COMMERCIAL

## 2019-09-10 VITALS
OXYGEN SATURATION: 95 % | HEIGHT: 62 IN | RESPIRATION RATE: 16 BRPM | TEMPERATURE: 97.2 F | BODY MASS INDEX: 25.88 KG/M2 | WEIGHT: 140.65 LBS | HEART RATE: 59 BPM | DIASTOLIC BLOOD PRESSURE: 89 MMHG | SYSTOLIC BLOOD PRESSURE: 149 MMHG

## 2019-09-10 DIAGNOSIS — M79.672 LEFT FOOT PAIN: ICD-10-CM

## 2019-09-10 PROCEDURE — 99284 EMERGENCY DEPT VISIT MOD MDM: CPT

## 2019-09-10 PROCEDURE — 73630 X-RAY EXAM OF FOOT: CPT | Mod: LT

## 2019-09-10 NOTE — ED PROVIDER NOTES
ED Provider Note    Scribed for Jt Robles M.D. by Toney Anton. 9/10/2019, 10:35 AM.    Primary care provider: IAN Oliva  Means of arrival: Walk-in  History obtained from: Patient  History limited by: None    CHIEF COMPLAINT  Chief Complaint   Patient presents with   • Foot Pain     left foot, pt reports that she chrushed her foot with a can, happened 5 wks ago, pt reports that she has pain and swelling and can't put a normal shoe on.       HPI  Karen Dimas is a 68 y.o. female who presents to the Emergency Department for mild, persistent left foot pain after dropping a can on her foot about 5 weeks ago. She is having difficulty walking secondary to pain and states that her pain is exacerbated upon moving her big toe. Denies ankle pain.     REVIEW OF SYSTEMS  See HPI above.     PAST MEDICAL HISTORY  Patient has a past medical history of Cataract, Chronic back pain, Dental disorder, Heart burn, Hemorrhagic disorder (), Hiatus hernia syndrome, High cholesterol, Hypertension, Indigestion, Myocardial infarct (HCC) (), Psychiatric problem, and Stress-induced cardiomyopathy (2014).    SURGICAL HISTORY  Patient has a past surgical history that includes gastroscopy with biopsy (2014); colonoscopy with biopsy (2014); gyn surgery; cholecystectomy; and cervical disk and fusion anterior (2017).    SOCIAL HISTORY  Social History     Tobacco Use   • Smoking status: Former Smoker     Packs/day: 0.50     Years: 20.00     Pack years: 10.00     Types: Cigarettes     Last attempt to quit: 10/24/2017     Years since quittin.8   • Smokeless tobacco: Never Used   Substance Use Topics   • Alcohol use: Yes     Comment: once every 6 months   • Drug use: Yes     Types: Marijuana     Comment: marijuana, last smoke yesterday at 1800      Social History     Substance and Sexual Activity   Drug Use Yes   • Types: Marijuana    Comment: marijuana, last smoke yesterday at 1800  "      FAMILY HISTORY  None noted.     CURRENT MEDICATIONS  Home Medications     Reviewed by Sofy Villanueva R.N. (Registered Nurse) on 09/10/19 at [employer]  Med List Status: Partial   Medication Last Dose Status   alendronate (FOSAMAX) 70 MG Tab 9/4/2019 Active   aspirin EC (ECOTRIN) 81 MG Tablet Delayed Response 9/9/2019 Active   enalapril (VASOTEC) 20 MG tablet 9/9/2019 Active   hydrOXYzine (ATARAX) 25 MG Tab 9/10/2019 Active   trazodone (DESYREL) 50 MG Tab 9/9/2019 Active   vitamin D 5000 units Tab 9/9/2019 Active                ALLERGIES  Allergies   Allergen Reactions   • Codeine Vomiting     [id number]       PHYSICAL EXAM  VITAL SIGNS: BP (!) 180/105   Pulse 80   Temp 36.2 °C (97.2 °F) (Temporal)   Resp 16   Ht 1.575 m (5' 2\")   Wt 63.8 kg (140 lb 10.5 oz)   SpO2 98%   BMI 25.73 kg/m²     Constitutional: Well developed, Well nourished, No acute distress, Non-toxic appearance.   HENT: Normocephalic, Atraumatic, Bilateral external ears normal.  Eyes: conjunctiva is normal. There are no signs of exudate.  Neck: Supple.   Cardiovascular: Regular rate and rhythm without murmurs gallops or rubs.   Thorax & Lungs: No respiratory distress. Breathing comfortably.  Skin: Warm, Dry, No erythema,   Back: No tenderness, No CVA tenderness.  Musculoskeletal: Well-healed scar over the dorsum of the left foot over the distal 3rd metatarsal. Tender over the 1st metatarsal. Slight tenderness over the first MCP joint. No other deformities. All tendons intact. Good range of motion in all major joints.  Intact distal pulses, no clubbing, no cyanosis, no edema.  Neurologic: Alert & oriented x 3, Normal motor function, Normal sensory function, No focal deficits noted.   Psychiatric: Affect normal, Judgment normal, Mood normal.     RADIOLOGY  DX-FOOT-COMPLETE 3+ LEFT   Final Result      No acute fracture is identified.        The radiologist's interpretation of all radiological studies have been reviewed by me.    COURSE & MEDICAL " DECISION MAKING  Nursing notes, VS, PMSFHx reviewed in chart.    10:35 AM - Patient seen and examined at bedside. Ordered DX-Foot to evaluate her symptoms.     11:11 AM - Reviewed radiology films and did not show any acute abnormalities. Reevaluated patient at bedside and inform her of results. I have placed her on crutches for a week.  Feels more like just a contusion to the area.  I advised her to then follow up with an orthopedist in 2 weeks. She is to take OTC anti-inflammatories for management of her pain.      It was noticed that the patient's blood pressure was greater than 120/80 on today's visit. At this point, most likely related to reactive hypertension, secondary to the emergency visit itself. I have recommend the patient followup with her primary care physician for recheck of her blood pressure.       DISPOSITION:  Patient will be discharged home in stable condition.    FOLLOW UP:  Carlos Kim M.D.  [address] [facility] [address]  [phone]    Schedule an appointment as soon as possible for a visit in 1 week  For re-check, Return if any symptoms worsen      FINAL IMPRESSION  1. Left foot pain          Toney BENAVIDES (Scribe), am scribing for, and in the presence of, Jt Robles M.D..    Electronically signed by: Toney Anton (Rosalind), 9/10/2019    Jt BENAVIDES M.D. personally performed the services described in this documentation, as scribed by Toney Anton in my presence, and it is both accurate and complete. E    The note accurately reflects work and decisions made by me.  Jt Robles  9/10/2019  5:08 PM

## 2019-09-10 NOTE — ED TRIAGE NOTES
Pt ambulated to triage with   Chief Complaint   Patient presents with   • Foot Pain     left foot, pt reports that she chrushed her foot with a can, happened 5 wks ago, pt reports that she has pain and swelling and can't put a normal shoe on.      Pt Informed regarding triage process and verbalized understanding to inform triage tech or RN for any changes in condition. Placed in lobby.

## 2019-11-26 NOTE — DISCHARGE PLANNING
[facility] has accepted patient.      Received DME choice form from Monika(VISHAL). Referral sent to [facility].   no

## 2020-03-12 ENCOUNTER — HOSPITAL ENCOUNTER (OUTPATIENT)
Dept: RADIOLOGY | Facility: MEDICAL CENTER | Age: 70
End: 2020-03-12
Attending: PHYSICIAN ASSISTANT
Payer: MEDICARE

## 2020-03-12 DIAGNOSIS — M79.622 PAIN OF LEFT UPPER ARM: ICD-10-CM

## 2020-03-12 DIAGNOSIS — M79.621 PAIN OF RIGHT UPPER ARM: ICD-10-CM

## 2020-03-12 DIAGNOSIS — M54.2 CERVICALGIA: ICD-10-CM

## 2020-03-12 PROCEDURE — 72141 MRI NECK SPINE W/O DYE: CPT

## 2020-03-12 PROCEDURE — 72052 X-RAY EXAM NECK SPINE 6/>VWS: CPT

## 2020-03-12 PROCEDURE — 73030 X-RAY EXAM OF SHOULDER: CPT | Mod: RT

## 2020-03-12 PROCEDURE — 73030 X-RAY EXAM OF SHOULDER: CPT | Mod: LT

## 2020-06-09 NOTE — PROGRESS NOTES
CC: ***    HPI:  Karen Dimas is a 69 y.o. female kindly referred by Trisha Coffey P.A.-C.for evaluation of polycythemia and snoring.      Symptom Summary:  Snoring: +  Very loud snoring: +  Witnessed apneas: +  Resuscitative snorts: +  Nocturnal shortness of breath: +  Non-restorative sleep: +  Insomnia: +  Nocturnal awakenings: +  Nocturia: +  EDS: +  Fatigue: +  Tiredness: +  Falls asleep accidentally: +   Napping or returning to bed after arising: +  Restless legs: -  Limb movements during sleep: +  Nocturnal headaches: +      Significant comorbidities modifying factors include former smoker, chronic back pain, dyslipidemia, hypertension, gastroesophageal reflux, abnormal echo, anxiety, fatty liver, and polycythemia.    Patient Active Problem List    Diagnosis Date Noted   • Atypical chest pain 12/18/2016     Priority: High   • Transaminitis 12/19/2016     Priority: Medium   • Hyperglobulinemia 12/19/2016     Priority: Medium   • Stress-induced cardiomyopathy 09/30/2014     Priority: Medium   • Microscopic colitis 09/28/2014     Priority: Medium   • Hypophosphatemia 09/26/2014     Priority: Medium   • Essential hypertension 09/26/2014     Priority: Medium   • Gastritis 09/25/2014     Priority: Medium   • Osteoporosis 12/18/2016     Priority: Low   • Anxiety 09/29/2014     Priority: Low   • Insomnia 09/26/2014     Priority: Low   • Intervertebral cervical disc disorder with myelopathy, cervical region 11/29/2017   • Weakness 07/31/2015   • Elevated troponin 09/29/2014       Past Medical History:   Diagnosis Date   • Cataract     no surgery   • Chronic back pain     and neck   • Dental disorder    • Heart burn    • Hemorrhagic disorder (HCC)     hemorrhaged after hysterectomy   • Hiatus hernia syndrome    • High cholesterol    • Hypertension    • Indigestion    • Myocardial infarct (HCC) 1995    showed up on ekg, sees once a year cardiology   • Psychiatric problem     anxiety/depression   •  Stress-induced cardiomyopathy 2014        Past Surgical History:   Procedure Laterality Date   • CERVICAL DISK AND FUSION ANTERIOR  2017    Procedure: CERVICAL DISK AND FUSION ANTERIOR C4-7 WITH PLATE;  Surgeon: Prem Back M.D.;  Location: SURGERY [facility];  Service: Neurosurgery   • GASTROSCOPY WITH BIOPSY  2014    Performed by Carlos Ivan M.D. at ENDOSCOPY [facility]   • COLONOSCOPY WITH BIOPSY  2014    Performed by Carlos Ivan M.D. at ENDOSCOPY [facility]   • CHOLECYSTECTOMY     • GYN SURGERY      Hysterectomy       No family history on file.    Social History     Socioeconomic History   • Marital status:      Spouse name: Not on file   • Number of children: Not on file   • Years of education: Not on file   • Highest education level: Not on file   Occupational History   • Not on file   Social Needs   • Financial resource strain: Not on file   • Food insecurity     Worry: Not on file     Inability: Not on file   • Transportation needs     Medical: Not on file     Non-medical: Not on file   Tobacco Use   • Smoking status: Former Smoker     Packs/day: 0.50     Years: 20.00     Pack years: 10.00     Types: Cigarettes     Last attempt to quit: 10/24/2017     Years since quittin.6   • Smokeless tobacco: Never Used   Substance and Sexual Activity   • Alcohol use: Yes     Comment: once every 6 months   • Drug use: Yes     Types: Marijuana     Comment: marijuana, last smoke yesterday at 1800   • Sexual activity: Not on file   Lifestyle   • Physical activity     Days per week: Not on file     Minutes per session: Not on file   • Stress: Not on file   Relationships   • Social connections     Talks on phone: Not on file     Gets together: Not on file     Attends Confucianist service: Not on file     Active member of club or organization: Not on file     Attends meetings of clubs or organizations: Not on file     Relationship status: Not on file   •  "Intimate partner violence     Fear of current or ex partner: Not on file     Emotionally abused: Not on file     Physically abused: Not on file     Forced sexual activity: Not on file   Other Topics Concern   • Not on file   Social History Narrative   • Not on file       Current Outpatient Medications   Medication Sig Dispense Refill   • aspirin EC (ECOTRIN) 81 MG Tablet Delayed Response Take 81 mg by mouth every day.     • vitamin D 5000 units Tab Take 5,000 Units by mouth every day. 60 Tab 0   • enalapril (VASOTEC) 20 MG tablet Take 20 mg by mouth every evening.     • hydrOXYzine (ATARAX) 25 MG Tab Take 25 mg by mouth 2 Times a Day.     • alendronate (FOSAMAX) 70 MG Tab Take 70 mg by mouth every 7 days.     • trazodone (DESYREL) 50 MG Tab Take 100-150 mg by mouth at bedtime as needed for Sleep.       No current facility-administered medications for this visit.     \"CURRENT RX\"    ALLERGIES: Codeine    ROS  ***  Constitutional: Denies fever, chills, sweats,  weight loss, fatigue.  Eyes: Denies vision loss, pain, drainage, double vision, glasses.  Ears/Nose/Mouth/Throat: Denies earache, difficulty hearing, rhinitis/nasal congestion, injury, recurrent sore throat, persistent hoarseness, decayed teeth/toothaches, ringing or buzzing in the ears.  Cardiovascular: Denies chest pain, tightness, palpitations, swelling in legs/feet, fainting, difficulty breathing when lying down but gets better when sitting up.   Respiratory: Denies shortness of breath, cough, sputum, wheezing, painful breathing, coughing up blood.   Sleep: per HPI  Gastrointestinal: Denies  difficulty swallowing, nausea, abdominal pain, diarrhea, constipation, heartburn.  Genitourinary: Denies  blood in urine, discharge, frequent urination.   Musculoskeletal: Denies painful joints, sore muscles, back pain.   Integumentary: Denies rashes, lumps, color changes.   Neurological: Denies frequent headaches,weakness, dizziness.    PHYSICAL EXAM  ***    There were " no vitals taken for this visit.  Appearance: Well-nourished, well-developed, no acute distress  Eyes:  PERRLA, EOMI  ENMT: without lesions, deformities;hearing grossly intact; tongue normal, posterior pharynx without erythema or exudate;   Modified Mallampati (AKA Hernandez) Score:  Neck: Supple, trachea midline, no masses  Respiratory effort:  No intercostal retractions or use of accessory muscles  Lung auscultation:  No wheezes rhonchi rubs or rales  Cardiac: No murmurs, rubs, or gallops; regular rhythm, normal rate; no edema  Abdomen:  No tenderness, no organomegaly  Musculoskeletal:  Grossly normal; gait and station normal; digits and nails normal  Skin:  No rashes, petechiae, cyanosis  Neurologic: without focal signs; oriented to person, time, place, and purpose; judgement intact  Psychiatric:  No depression, anxiety, agitation        PROBLEMS:  ***  There are no diagnoses linked to this encounter.    PLAN:   The patient has signs and symptoms consistent with obstructive sleep apnea hypopnea syndrome. Will schedule to have a nocturnal polysomnogram using zolpidem to assist with sleep onset and maintenance should the need arise. Will return after the results are available to determine further diagnostic needs and/or treatment options.    The risks of untreated sleep apnea were discussed with the patient at length. Patients with BRETT are at increased risk of cardiovascular disease including coronary artery disease, systemic arterial hypertension, pulmonary arterial hypertension, cardiac arrythmias, and stroke. BRTET patients have an increased risk of motor vehicle accidents, type 2 diabetes, chronic kidney disease, and non-alcoholic liver disease. The patient was advised to avoid driving a motor vehicle when drowsy.    Positive airway pressure, such as CPAP, is considered first-line and preferred therapy for sleep apnea and may reverse both symptoms and risks.    Have advised the patient to follow up with the  appropriate healthcare practitioners for all other medical problems and issues.        ***  No follow-ups on file.

## 2020-06-11 ENCOUNTER — APPOINTMENT (OUTPATIENT)
Dept: SLEEP MEDICINE | Facility: MEDICAL CENTER | Age: 70
End: 2020-06-11
Payer: MEDICARE

## 2020-07-31 ENCOUNTER — TELEPHONE (OUTPATIENT)
Dept: MEDICAL GROUP | Facility: PHYSICIAN GROUP | Age: 70
End: 2020-07-31

## 2020-08-03 ENCOUNTER — OFFICE VISIT (OUTPATIENT)
Dept: MEDICAL GROUP | Facility: MEDICAL CENTER | Age: 70
End: 2020-08-03
Payer: MEDICARE

## 2020-08-03 VITALS
TEMPERATURE: 98.5 F | HEART RATE: 97 BPM | HEIGHT: 62 IN | DIASTOLIC BLOOD PRESSURE: 80 MMHG | WEIGHT: 143.74 LBS | OXYGEN SATURATION: 95 % | BODY MASS INDEX: 26.45 KG/M2 | SYSTOLIC BLOOD PRESSURE: 150 MMHG

## 2020-08-03 DIAGNOSIS — I51.81 STRESS-INDUCED CARDIOMYOPATHY: ICD-10-CM

## 2020-08-03 DIAGNOSIS — Z00.00 HEALTH CARE MAINTENANCE: ICD-10-CM

## 2020-08-03 DIAGNOSIS — M50.00 INTERVERTEBRAL CERVICAL DISC DISORDER WITH MYELOPATHY, CERVICAL REGION: ICD-10-CM

## 2020-08-03 DIAGNOSIS — M81.0 AGE-RELATED OSTEOPOROSIS WITHOUT CURRENT PATHOLOGICAL FRACTURE: ICD-10-CM

## 2020-08-03 DIAGNOSIS — Z12.11 COLON CANCER SCREENING: ICD-10-CM

## 2020-08-03 DIAGNOSIS — F51.04 CHRONIC INSOMNIA: ICD-10-CM

## 2020-08-03 DIAGNOSIS — I10 ESSENTIAL HYPERTENSION: ICD-10-CM

## 2020-08-03 DIAGNOSIS — E78.2 MIXED HYPERLIPIDEMIA: ICD-10-CM

## 2020-08-03 PROCEDURE — 99204 OFFICE O/P NEW MOD 45 MIN: CPT | Performed by: FAMILY MEDICINE

## 2020-08-03 RX ORDER — GABAPENTIN 100 MG/1
CAPSULE ORAL
COMMUNITY
Start: 2020-05-20 | End: 2021-01-20

## 2020-08-03 RX ORDER — IBUPROFEN 800 MG/1
TABLET ORAL
COMMUNITY
Start: 2020-07-06 | End: 2021-01-20

## 2020-08-03 RX ORDER — ACETAMINOPHEN 160 MG
TABLET,DISINTEGRATING ORAL DAILY
COMMUNITY

## 2020-08-03 RX ORDER — ATORVASTATIN CALCIUM 20 MG/1
TABLET, FILM COATED ORAL
COMMUNITY
Start: 2020-06-17 | End: 2021-01-20 | Stop reason: SDUPTHER

## 2020-08-03 RX ORDER — MIRTAZAPINE 15 MG/1
15 TABLET, FILM COATED ORAL
Qty: 90 TAB | Refills: 3 | Status: SHIPPED | OUTPATIENT
Start: 2020-08-03 | End: 2021-01-20

## 2020-08-03 RX ORDER — PREDNISONE 20 MG/1
TABLET ORAL
COMMUNITY
Start: 2020-07-15 | End: 2020-08-03

## 2020-08-03 RX ORDER — TIZANIDINE 2 MG/1
TABLET ORAL
COMMUNITY
Start: 2020-05-20 | End: 2021-01-20

## 2020-08-03 ASSESSMENT — PATIENT HEALTH QUESTIONNAIRE - PHQ9
SUM OF ALL RESPONSES TO PHQ QUESTIONS 1-9: 18
5. POOR APPETITE OR OVEREATING: 3 - NEARLY EVERY DAY
CLINICAL INTERPRETATION OF PHQ2 SCORE: 6

## 2020-08-03 NOTE — LETTER
[facility]  Alfonso Rowland M.D.  [address]  [place] [address]  Fax: [phone]   Authorization for Release/Disclosure of   Protected Health Information   Name: DONAL BOOKER : 1950 SSN: [id number]   Address: [address] [place] [address]  [place] [address] Phone:    [phone] (home)    I authorize the entity listed below to release/disclose the PHI below to:   [facility]/Alfonso Rowland M.D. and Alfonso Rowland M.D.   Provider or Entity Name:                                                [facility]     Address   City, State, Zip   Phone:      Fax:     Reason for request: continuity of care   Information to be released:    [  ] LAST COLONOSCOPY,  including any PATH REPORT and follow-up  [  ] LAST FIT/COLOGUARD RESULT [  ] LAST DEXA  [  ] LAST MAMMOGRAM  [  ] LAST PAP  [  ] LAST LABS [  ] RETINA EXAM REPORT  [  ] IMMUNIZATION RECORDS  [X] Release all info      [  ] Check here and initial the line next to each item to release ALL health information INCLUDING  _____ Care and treatment for drug and / or alcohol abuse  _____ HIV testing, infection status, or AIDS  _____ Genetic Testing    DATES OF SERVICE OR TIME PERIOD TO BE DISCLOSED: _____________  I understand and acknowledge that:  * This Authorization may be revoked at any time by you in writing, except if your health information has already been used or disclosed.  * Your health information that will be used or disclosed as a result of you signing this authorization could be re-disclosed by the recipient. If this occurs, your re-disclosed health information may no longer be protected by State or Federal laws.  * You may refuse to sign this Authorization. Your refusal will not affect your ability to obtain treatment.  * This Authorization becomes effective upon signing and will  on (date) __________.      If no date is indicated, this Authorization will  one (1) year from the signature  date.    Name: Karen Galeford    Signature:   Date:     8/3/2020       PLEASE FAX REQUESTED RECORDS BACK TO: [phone]

## 2020-08-03 NOTE — PROGRESS NOTES
CC: New patient: Hypertension, cervical disc disorder, hyperlipidemia, stress cardiomyopathy, osteoporosis    HPI:  Karen presents today to establish new PCP.    Patient has been active and independent with all ADLs.  Of the following chronic medical issues:    Essential hypertension  Has been adequately controlled on current medication. Denies headache, chest pain, and SOB.patient has been on enalapril 20 mg daily,    Intervertebral cervical disc disorder with myelopathy, cervical region  Patient underwent cervical spine( C4-C7 spinal cord compression with symptomatic radicular myelopathy) decompression surgery in 11/2017.  She has been doing better since then, she is currently on gabapentin, and tizanidine, and as needed ibuprofen    Mixed hyperlipidemia  she has been tolerating the statin. Denies muscle pain LFTs has been normal, has been on atorvastatin 20 mg daily.  Patient stated that she has history of heart attack, however last echocardiogram showed no significant abnormality    Stress-induced cardiomyopathy  Patient has been asymptomatic.  However last echocardiogram was essentially normal:    Left ventricular ejection fraction is 55% to 60%.   Focal mid anterior and anteroseptal wall  hypokinesis.   Grade II diastolic dysfunction .   Normal right ventricular size and function.   No significant valvular abnormalities.   Normal pericardium without effusion.     Patient has been on aspirin and statin    Age-related osteoporosis without current pathological fracture  No history of fracture, patient has been asymptomatic.  She has been doing fine on Fosamax 70 mg weekly    Chronic insomnia  Trazodone has been helping but has been causing abdominal upset, wants to try something else.      Discussed health maintenance topics next visitg  Due for colonoscopy        Patient Active Problem List    Diagnosis Date Noted   • Atypical chest pain 12/18/2016     Priority: High   • Transaminitis 12/19/2016     Priority:  Medium   • Hyperglobulinemia 12/19/2016     Priority: Medium   • Stress-induced cardiomyopathy 09/30/2014     Priority: Medium   • Microscopic colitis 09/28/2014     Priority: Medium   • Hypophosphatemia 09/26/2014     Priority: Medium   • Essential hypertension 09/26/2014     Priority: Medium   • Gastritis 09/25/2014     Priority: Medium   • Osteoporosis 12/18/2016     Priority: Low   • Anxiety 09/29/2014     Priority: Low   • Insomnia 09/26/2014     Priority: Low   • Intervertebral cervical disc disorder with myelopathy, cervical region 11/29/2017   • Weakness 07/31/2015   • Elevated troponin 09/29/2014       Current Outpatient Medications   Medication Sig Dispense Refill   • gabapentin (NEURONTIN) 100 MG Cap TAKE 1 CAPSULE ORALLY ONCE PER DAY AT BEDTIME FOR PAIN FOR 90 DAYS     • atorvastatin (LIPITOR) 20 MG Tab TAKE 1 TABLET ORALLY ONCE PER DAY  DAYS EVERY EVENING TO LOWER CHOLESTEROL     • ibuprofen (MOTRIN) 800 MG Tab TAKE 1 TABLET ORALLY EVERY 12 HOURS FOR 30 DAYS WITH FULL GLASS OF WATER AND FOOD FOR PAIN     • tizanidine (ZANAFLEX) 2 MG tablet TAKE 2 TABLETS (1 OR 2 TABS) BY MOUTH DAILY AT BEDTIME AS NEEDED FOR MUSCLE SPASMS**DOSE INCREASE**     • Cholecalciferol (VITAMIN D3) 2000 UNIT Cap Take  by mouth.     • mirtazapine (REMERON) 15 MG Tab Take 1 Tab by mouth every bedtime. 90 Tab 3   • aspirin EC (ECOTRIN) 81 MG Tablet Delayed Response Take 81 mg by mouth every day.     • enalapril (VASOTEC) 20 MG tablet Take 20 mg by mouth every evening.     • alendronate (FOSAMAX) 70 MG Tab Take 70 mg by mouth every 7 days.     • Diclofenac Sodium 1 % Gel APPLY 2 3 GRAMS ON THE SKIN TO ANY AREA YOU HAVE PAIN THREE TO FOUR TIMES DAILY AS NEEDED     • hydrOXYzine (ATARAX) 25 MG Tab Take 25 mg by mouth 2 Times a Day.       No current facility-administered medications for this visit.          Allergies as of 08/03/2020 - Reviewed 08/03/2020   Allergen Reaction Noted   • Codeine Vomiting 09/25/2014        Social  History     Socioeconomic History   • Marital status:      Spouse name: Not on file   • Number of children: Not on file   • Years of education: Not on file   • Highest education level: Not on file   Occupational History   • Not on file   Social Needs   • Financial resource strain: Not on file   • Food insecurity     Worry: Not on file     Inability: Not on file   • Transportation needs     Medical: Not on file     Non-medical: Not on file   Tobacco Use   • Smoking status: Former Smoker     Packs/day: 0.50     Years: 20.00     Pack years: 10.00     Types: Cigarettes     Last attempt to quit: 10/24/2017     Years since quittin.7   • Smokeless tobacco: Never Used   Substance and Sexual Activity   • Alcohol use: Yes     Comment: once every 6 months   • Drug use: Yes     Types: Marijuana     Comment: marijuana, last smoke yesterday at 1800   • Sexual activity: Not on file   Lifestyle   • Physical activity     Days per week: Not on file     Minutes per session: Not on file   • Stress: Not on file   Relationships   • Social connections     Talks on phone: Not on file     Gets together: Not on file     Attends [employer] service: Not on file     Active member of club or organization: Not on file     Attends meetings of clubs or organizations: Not on file     Relationship status: Not on file   • Intimate partner violence     Fear of current or ex partner: Not on file     Emotionally abused: Not on file     Physically abused: Not on file     Forced sexual activity: Not on file   Other Topics Concern   • Not on file   Social History Narrative   • Not on file       History reviewed. No pertinent family history.    Past Surgical History:   Procedure Laterality Date   • CERVICAL DISK AND FUSION ANTERIOR  2017    Procedure: CERVICAL DISK AND FUSION ANTERIOR C4-7 WITH PLATE;  Surgeon: Prem Back M.D.;  Location: SURGERY [facility];  Service: Neurosurgery   • GASTROSCOPY WITH BIOPSY  2014    Performed  "by Carlos Ivan M.D. at ENDOSCOPY [facility]   • COLONOSCOPY WITH BIOPSY  9/28/2014    Performed by aCrlos Ivan M.D. at ENDOSCOPY [facility]   • CHOLECYSTECTOMY     • GYN SURGERY      Hysterectomy       ROS:  Denies any Headache, Blurred Vision, Confusion Chest pain,  Shortness of breath,  Abdominal pain, Changes of bowel or bladder, Lower ext edema, Fevers, Nights sweats, Weight Changes, Focal weakness or numbness.  All other systems are negative.    /80 (BP Location: Left arm, Patient Position: Sitting, BP Cuff Size: Adult)   Pulse 97   Temp 36.9 °C (98.5 °F) (Tympanic)   Ht 1.575 m (5' 2\")   Wt 65.2 kg (143 lb 11.8 oz)   SpO2 95%   BMI 26.29 kg/m²     Physical Exam:  Gen:         Alert and oriented, No apparent distress.  HEENT:   Perrla, TM clear,  Oralpharynx no erythema or exudates.  Neck:       No Jugular venous distension, Lymphadenopathy, Thyromegaly, Bruits.  Lungs:     Clear to auscultation bilaterally  CV:          Regular rate and rhythm. No murmurs, rubs or gallops.  Abd:         Soft non tender, non distended. Normal active bowel sounds. No                                        Hepatosplenomegaly, No pulsatile masses.  Ext:          No clubbing, cyanosis, edema.      Assessment and Plan.   69 y.o. female     1. Essential hypertension  Controlled.  Continue on enalapril 20 mg daily,    - CBC WITH DIFFERENTIAL; Future  - Comp Metabolic Panel; Future  - Lipid Profile; Future  - TSH; Future    2. Intervertebral cervical disc disorder with myelopathy, cervical region  Chronic, status post decompression surgery.  Continue on gabapentin, and tizanidine, and as needed ibuprofen    3. Mixed hyperlipidemia  He has been tolerating the statin. Denies muscle pain LFTs has been normal  Continue on atorvastatin 20 mg daily.    - Lipid Profile; Future  - TSH; Future    4. Stress-induced cardiomyopathy  Stable.  Asymptomatic.  However last echocardiogram was essentially " normal  Continue on aspirin and statin    5. Age-related osteoporosis without current pathological fracture  Patient has been doing fine on Fosamax 70 mg weekly    6. Chronic insomnia  Trazodone has been helping but has been causing abdominal upset, will change it to mirtazapine 15 mg nightly.    - mirtazapine (REMERON) 15 MG Tab; Take 1 Tab by mouth every bedtime.  Dispense: 90 Tab; Refill: 3    7. Health care maintenance  Discussed health maintenance topics next visit    8. Colon cancer screening  Due for colonoscopy    - REFERRAL TO GI FOR COLONOSCOPY

## 2020-09-21 ENCOUNTER — HOSPITAL ENCOUNTER (OUTPATIENT)
Dept: LAB | Facility: MEDICAL CENTER | Age: 70
End: 2020-09-21
Attending: FAMILY MEDICINE
Payer: MEDICARE

## 2020-09-21 DIAGNOSIS — I10 ESSENTIAL HYPERTENSION: ICD-10-CM

## 2020-09-21 DIAGNOSIS — E78.2 MIXED HYPERLIPIDEMIA: ICD-10-CM

## 2020-09-21 LAB
ALBUMIN SERPL BCP-MCNC: 4.4 G/DL (ref 3.2–4.9)
ALBUMIN/GLOB SERPL: 1.1 G/DL
ALP SERPL-CCNC: 86 U/L (ref 30–99)
ALT SERPL-CCNC: 52 U/L (ref 2–50)
ANION GAP SERPL CALC-SCNC: 12 MMOL/L (ref 7–16)
AST SERPL-CCNC: 59 U/L (ref 12–45)
BASOPHILS # BLD AUTO: 1.3 % (ref 0–1.8)
BASOPHILS # BLD: 0.09 K/UL (ref 0–0.12)
BILIRUB SERPL-MCNC: 0.5 MG/DL (ref 0.1–1.5)
BUN SERPL-MCNC: 9 MG/DL (ref 8–22)
CALCIUM SERPL-MCNC: 9.5 MG/DL (ref 8.5–10.5)
CHLORIDE SERPL-SCNC: 107 MMOL/L (ref 96–112)
CHOLEST SERPL-MCNC: 135 MG/DL (ref 100–199)
CO2 SERPL-SCNC: 23 MMOL/L (ref 20–33)
CREAT SERPL-MCNC: 0.75 MG/DL (ref 0.5–1.4)
EOSINOPHIL # BLD AUTO: 0.72 K/UL (ref 0–0.51)
EOSINOPHIL NFR BLD: 10.1 % (ref 0–6.9)
ERYTHROCYTE [DISTWIDTH] IN BLOOD BY AUTOMATED COUNT: 45 FL (ref 35.9–50)
FASTING STATUS PATIENT QL REPORTED: NORMAL
GLOBULIN SER CALC-MCNC: 4 G/DL (ref 1.9–3.5)
GLUCOSE SERPL-MCNC: 109 MG/DL (ref 65–99)
HCT VFR BLD AUTO: 47.3 % (ref 37–47)
HDLC SERPL-MCNC: 57 MG/DL
HGB BLD-MCNC: 15.8 G/DL (ref 12–16)
IMM GRANULOCYTES # BLD AUTO: 0.02 K/UL (ref 0–0.11)
IMM GRANULOCYTES NFR BLD AUTO: 0.3 % (ref 0–0.9)
LDLC SERPL CALC-MCNC: 53 MG/DL
LYMPHOCYTES # BLD AUTO: 2.37 K/UL (ref 1–4.8)
LYMPHOCYTES NFR BLD: 33.3 % (ref 22–41)
MCH RBC QN AUTO: 31.4 PG (ref 27–33)
MCHC RBC AUTO-ENTMCNC: 33.4 G/DL (ref 33.6–35)
MCV RBC AUTO: 94 FL (ref 81.4–97.8)
MONOCYTES # BLD AUTO: 0.67 K/UL (ref 0–0.85)
MONOCYTES NFR BLD AUTO: 9.4 % (ref 0–13.4)
NEUTROPHILS # BLD AUTO: 3.25 K/UL (ref 2–7.15)
NEUTROPHILS NFR BLD: 45.6 % (ref 44–72)
NRBC # BLD AUTO: 0 K/UL
NRBC BLD-RTO: 0 /100 WBC
PLATELET # BLD AUTO: 231 K/UL (ref 164–446)
PMV BLD AUTO: 10 FL (ref 9–12.9)
POTASSIUM SERPL-SCNC: 4.4 MMOL/L (ref 3.6–5.5)
PROT SERPL-MCNC: 8.4 G/DL (ref 6–8.2)
RBC # BLD AUTO: 5.03 M/UL (ref 4.2–5.4)
SODIUM SERPL-SCNC: 142 MMOL/L (ref 135–145)
TRIGL SERPL-MCNC: 125 MG/DL (ref 0–149)
TSH SERPL DL<=0.005 MIU/L-ACNC: 1.18 UIU/ML (ref 0.38–5.33)
WBC # BLD AUTO: 7.1 K/UL (ref 4.8–10.8)

## 2020-09-21 PROCEDURE — 80061 LIPID PANEL: CPT

## 2020-09-21 PROCEDURE — 80053 COMPREHEN METABOLIC PANEL: CPT

## 2020-09-21 PROCEDURE — 36415 COLL VENOUS BLD VENIPUNCTURE: CPT

## 2020-09-21 PROCEDURE — 84443 ASSAY THYROID STIM HORMONE: CPT

## 2020-09-21 PROCEDURE — 85025 COMPLETE CBC W/AUTO DIFF WBC: CPT

## 2021-01-20 ENCOUNTER — OFFICE VISIT (OUTPATIENT)
Dept: MEDICAL GROUP | Facility: MEDICAL CENTER | Age: 71
End: 2021-01-20
Payer: MEDICARE

## 2021-01-20 VITALS
HEART RATE: 84 BPM | DIASTOLIC BLOOD PRESSURE: 82 MMHG | SYSTOLIC BLOOD PRESSURE: 138 MMHG | WEIGHT: 155 LBS | TEMPERATURE: 97.7 F | RESPIRATION RATE: 16 BRPM | OXYGEN SATURATION: 95 % | BODY MASS INDEX: 28.52 KG/M2 | HEIGHT: 62 IN

## 2021-01-20 DIAGNOSIS — Z78.0 POSTMENOPAUSAL STATUS: ICD-10-CM

## 2021-01-20 DIAGNOSIS — R74.8 ELEVATED LIVER ENZYMES: ICD-10-CM

## 2021-01-20 DIAGNOSIS — M81.0 AGE-RELATED OSTEOPOROSIS WITHOUT CURRENT PATHOLOGICAL FRACTURE: ICD-10-CM

## 2021-01-20 DIAGNOSIS — F51.04 CHRONIC INSOMNIA: ICD-10-CM

## 2021-01-20 DIAGNOSIS — M62.838 MUSCLE SPASM: ICD-10-CM

## 2021-01-20 DIAGNOSIS — Z23 NEED FOR VACCINATION AGAINST STREPTOCOCCUS PNEUMONIAE: ICD-10-CM

## 2021-01-20 DIAGNOSIS — K29.50 CHRONIC GASTRITIS WITHOUT BLEEDING, UNSPECIFIED GASTRITIS TYPE: ICD-10-CM

## 2021-01-20 DIAGNOSIS — M50.00 INTERVERTEBRAL CERVICAL DISC DISORDER WITH MYELOPATHY, CERVICAL REGION: ICD-10-CM

## 2021-01-20 DIAGNOSIS — I51.81 STRESS-INDUCED CARDIOMYOPATHY: ICD-10-CM

## 2021-01-20 DIAGNOSIS — E78.2 MIXED HYPERLIPIDEMIA: ICD-10-CM

## 2021-01-20 DIAGNOSIS — M54.10 BILATERAL RADICULAR PAIN: ICD-10-CM

## 2021-01-20 DIAGNOSIS — R07.89 LEFT-SIDED CHEST WALL PAIN: ICD-10-CM

## 2021-01-20 DIAGNOSIS — S22.41XS CLOSED FRACTURE OF MULTIPLE RIBS OF RIGHT SIDE, SEQUELA: ICD-10-CM

## 2021-01-20 DIAGNOSIS — Z12.31 ENCOUNTER FOR SCREENING MAMMOGRAM FOR BREAST CANCER: ICD-10-CM

## 2021-01-20 DIAGNOSIS — D22.61: ICD-10-CM

## 2021-01-20 DIAGNOSIS — I10 ESSENTIAL HYPERTENSION: ICD-10-CM

## 2021-01-20 DIAGNOSIS — E55.9 VITAMIN D DEFICIENCY: ICD-10-CM

## 2021-01-20 PROCEDURE — G0009 ADMIN PNEUMOCOCCAL VACCINE: HCPCS | Performed by: FAMILY MEDICINE

## 2021-01-20 PROCEDURE — 90732 PPSV23 VACC 2 YRS+ SUBQ/IM: CPT | Performed by: FAMILY MEDICINE

## 2021-01-20 PROCEDURE — 99214 OFFICE O/P EST MOD 30 MIN: CPT | Mod: 25 | Performed by: FAMILY MEDICINE

## 2021-01-20 RX ORDER — ENALAPRIL MALEATE 20 MG/1
20 TABLET ORAL EVERY EVENING
Qty: 90 TAB | Refills: 3 | Status: SHIPPED | OUTPATIENT
Start: 2021-01-20 | End: 2021-02-23 | Stop reason: SDUPTHER

## 2021-01-20 RX ORDER — GABAPENTIN 100 MG/1
100 CAPSULE ORAL 3 TIMES DAILY
Qty: 270 CAP | Refills: 3 | Status: SHIPPED | OUTPATIENT
Start: 2021-01-20 | End: 2021-04-06 | Stop reason: SDUPTHER

## 2021-01-20 RX ORDER — ALENDRONATE SODIUM 70 MG/1
70 TABLET ORAL
Qty: 12 TAB | Refills: 1 | Status: SHIPPED | OUTPATIENT
Start: 2021-01-20 | End: 2021-04-06

## 2021-01-20 RX ORDER — TIZANIDINE 2 MG/1
2 TABLET ORAL
Qty: 90 TAB | Refills: 3 | Status: SHIPPED | OUTPATIENT
Start: 2021-01-20 | End: 2021-04-06 | Stop reason: SDUPTHER

## 2021-01-20 RX ORDER — OMEPRAZOLE 40 MG/1
40 CAPSULE, DELAYED RELEASE ORAL DAILY
COMMUNITY
Start: 2020-12-17 | End: 2021-04-06

## 2021-01-20 RX ORDER — MIRTAZAPINE 30 MG/1
30 TABLET, FILM COATED ORAL
Qty: 90 TAB | Refills: 3 | Status: SHIPPED | OUTPATIENT
Start: 2021-01-20 | End: 2021-04-06 | Stop reason: SDUPTHER

## 2021-01-20 RX ORDER — ATORVASTATIN CALCIUM 20 MG/1
20 TABLET, FILM COATED ORAL
Qty: 90 TAB | Refills: 3 | Status: SHIPPED | OUTPATIENT
Start: 2021-01-20 | End: 2021-04-06 | Stop reason: SDUPTHER

## 2021-01-20 ASSESSMENT — FIBROSIS 4 INDEX: FIB4 SCORE: 2.41

## 2021-01-20 ASSESSMENT — PATIENT HEALTH QUESTIONNAIRE - PHQ9: CLINICAL INTERPRETATION OF PHQ2 SCORE: 0

## 2021-01-20 NOTE — PROGRESS NOTES
Chief Complaint   Patient presents with   • Establish Care       Subjective:     HPI:   Karen Dimas presents today with the following: She is establishing care with me as her previous provider is no longer in practice.  All problems are new to me.    1. Bilateral radicular pain  She has radicular pain in her arms but is also now having radicular pain down both legs into her feet.  She states this is quite severe.  She wakes up with quite severe pain.  After walking for a bit it is a little bit better.  Discussed imaging and also B vitamin blood testing.  Orders are discussed and placed.    2. Intervertebral cervical disc disorder with myelopathy, cervical region  Patient has history of cervical disc disease with some history of cord compression and radiculopathy.  This may be progressing, imaging discussed and orders placed.  Patient states the gabapentin is helpful.  The dosing is adjusted and the prescription is rewritten.    3. Muscle spasm  States the tizanidine 2 mg continues to be helpful.  This is renewed.    4. Essential hypertension  HTN - Chronic condition stable. Currently taking all meds as directed.   She is taking baby aspirin daily.   She is not monitoring BP at home.   Denies symptoms low BP: light-headed, tunnel-vision, unusual fatigue.   Denies symptoms high BP:pounding headache, visual changes, palpitations, flushed face.   Denies medicine side effects: unusual fatigue, slow heartbeat, foot/leg swelling, cough.  Follow-up lab order discussed and placed.    5. Chronic gastritis without bleeding, unspecified gastritis type  The patient feels the current medication regimen of over-the-counter omeprazole is controlling the gastritis symptoms well. Denies dysphagia, reflux symptoms, acidity, abdominal pain or visible blood or mucus in the stool. Denies vomiting or hematemesis. Denies burping or abdominal bloating. Patient avoids nonsteroidal anti-inflammatory drugs. Avoids heavy meals or eating  within 2 hours of bedtime.  Follow-up lab orders discussed and placed.    6. Stress-induced cardiomyopathy  Patient's last imaging in 2016 was relatively good.  She had a normal stress test with no induced ischemia and normal ejection fraction.  However, with this history I feel the patient should follow with cardiology.  Referral is discussed and placed.    7. Mixed hyperlipidemia  Patient denies chest pain, chest pressure, palpitations or exertional shortness of breath. Patient denies muscle aches or muscle weakness from the atorvastatin medication. Patient is a current daily smoker.  She is contemplative.  Patient takes 81 mg aspirin daily. Patient has past history of cardiomyopathy, also has history of myocardial infarction in 1995.  No history of stroke or significant peripheral vascular disease.  Lab orders discussed and placed.    8. Elevated liver enzymes  History of elevated liver enzymes in the past.  Denies significant alcohol use, states it is very rare.  Follow-up lab order discussed and placed..    9. Vitamin D deficiency/Age-related osteoporosis without current pathological fracture/Postmenopausal status  History of osteoporosis but I am having trouble finding documentation.  Patient currently taking Fosamax, unclear exactly how long she has been doing that may be 5 years by now.  Needs bone density reevaluation to see if she still needs to be even on the Fosamax.  DEXA scan order discussed and placed.    10. Encounter for screening mammogram for breast cancer  Mammogram order discussed and placed    11. Nevus of finger of right hand  Appears to be a black nevus under the nail of her third finger on her right hand.  This is significantly painful.  This does not appear to be a splinter hemorrhage.  Patient relates no trauma.  Concerned this may be an enlarging nevus.  Patient states this has been there at least a few months.  Dermatology referral is discussed and placed.    12. Chronic  insomnia  Patient was begun on mirtazapine by her previous provider for chronic insomnia.  She states it has helped significantly but would like an increase in the dose.  Does not appear to have any side effects to the medication.  This is increased to 30 mg nightly.    13. Need for vaccination against Streptococcus pneumoniae  Administered today pneumococcal 23.    14. Left-sided chest wall pain/Closed fracture of multiple ribs of right side, sequela  Patient with persistent left chest wall pain and swelling.  There is also mention on previous imaging of fractures of multiple ribs of the right side.  Patient recalls no fracture.  She does get occasional right-sided pain.  Rib series orders discussed and placed.      Patient Active Problem List    Diagnosis Date Noted   • Atypical chest pain 12/18/2016     Priority: High   • Transaminitis 12/19/2016     Priority: Medium   • Hyperglobulinemia 12/19/2016     Priority: Medium   • Stress-induced cardiomyopathy 09/30/2014     Priority: Medium   • Microscopic colitis 09/28/2014     Priority: Medium   • Hypophosphatemia 09/26/2014     Priority: Medium   • Essential hypertension 09/26/2014     Priority: Medium   • Gastritis 09/25/2014     Priority: Medium   • Osteoporosis 12/18/2016     Priority: Low   • Anxiety 09/29/2014     Priority: Low   • Insomnia 09/26/2014     Priority: Low   • Bilateral radicular pain 01/20/2021   • Muscle spasm 01/20/2021   • Mixed hyperlipidemia 01/20/2021   • Elevated liver enzymes 01/20/2021   • Vitamin D deficiency 01/20/2021   • Nevus of finger of right hand 01/20/2021   • Intervertebral cervical disc disorder with myelopathy, cervical region 11/29/2017   • Weakness 07/31/2015   • Elevated troponin 09/29/2014       Current medicines (including changes today)  Current Outpatient Medications   Medication Sig Dispense Refill   • gabapentin (NEURONTIN) 100 MG Cap Take 1 Cap by mouth 3 times a day. 270 Cap 3   • atorvastatin (LIPITOR) 20 MG Tab  "Take 1 Tab by mouth every bedtime. 90 Tab 3   • tizanidine (ZANAFLEX) 2 MG tablet Take 1 Tab by mouth every bedtime. 90 Tab 3   • mirtazapine (REMERON) 30 MG Tab tablet Take 1 Tab by mouth every bedtime. 90 Tab 3   • enalapril (VASOTEC) 20 MG tablet Take 1 Tab by mouth every evening. Indications: High Blood Pressure Disorder 90 Tab 3   • alendronate (FOSAMAX) 70 MG Tab Take 1 Tab by mouth every 7 days. Indications: Osteoporosis 12 Tab 1   • omeprazole (PRILOSEC) 40 MG delayed-release capsule Take 40 mg by mouth every day.     • Cholecalciferol (VITAMIN D3) 2000 UNIT Cap Take  by mouth.     • aspirin EC (ECOTRIN) 81 MG Tablet Delayed Response Take 81 mg by mouth every day.       No current facility-administered medications for this visit.        Allergies   Allergen Reactions   • Codeine Vomiting     [id number]       ROS: As per HPI       Objective:     /82   Pulse 84   Temp 36.5 °C (97.7 °F)   Resp 16   Ht 1.575 m (5' 2\")   Wt 70.3 kg (155 lb)   SpO2 95%  Body mass index is 28.35 kg/m².    Physical Exam:  Constitutional: Well-developed and well-nourished. Not diaphoretic. No distress. Lucid and fluent.  Patient, student, physician and staff all wearing masks.  Skin: Skin is warm and dry. No rash noted.  Head: Atraumatic without lesions.  Eyes: Conjunctivae and extraocular motions are normal. Pupils are equal, round, and reactive to light. No scleral icterus.   Ears:  External ears unremarkable.   Neck: Supple, trachea midline. No thyromegaly present. No cervical or supraclavicular lymphadenopathy. No JVD or carotid bruits appreciated  Cardiovascular: Regular rate and rhythm.  Normal S1, S2 without murmur appreciated.  Chest: Effort normal. Clear to auscultation throughout. No adventitious sounds.   Abdomen: Soft, non tender, and without distention. Active bowel sounds in all four quadrants. No rebound, guarding, masses or hepatosplenomegaly.  Extremities: No cyanosis, clubbing, erythema, nor edema. "   Neurological: Alert and oriented x 3. Fine tremor bilaterally.  Psychiatric:  Behavior, mood, and affect are appropriate.       Assessment and Plan:     70 y.o. female with the following issues:    1. Bilateral radicular pain  VITAMIN B12    VITAMIN B6    DX-LUMBAR SPINE-2 OR 3 VIEWS   2. Intervertebral cervical disc disorder with myelopathy, cervical region  gabapentin (NEURONTIN) 100 MG Cap    DX-CERVICAL SPINE-2 OR 3 VIEWS   3. Muscle spasm  tizanidine (ZANAFLEX) 2 MG tablet   4. Essential hypertension  Comp Metabolic Panel    enalapril (VASOTEC) 20 MG tablet   5. Chronic gastritis without bleeding, unspecified gastritis type     6. Stress-induced cardiomyopathy  REFERRAL TO CARDIOLOGY    Comp Metabolic Panel    CBC WITHOUT DIFFERENTIAL    enalapril (VASOTEC) 20 MG tablet   7. Mixed hyperlipidemia  atorvastatin (LIPITOR) 20 MG Tab    Comp Metabolic Panel    Lipid Profile   8. Elevated liver enzymes  Comp Metabolic Panel   9. Vitamin D deficiency  VITAMIN D,25 HYDROXY   10. Age-related osteoporosis without current pathological fracture  DS-BONE DENSITY STUDY (DEXA)    alendronate (FOSAMAX) 70 MG Tab   11. Postmenopausal status  DS-BONE DENSITY STUDY (DEXA)   12. Encounter for screening mammogram for breast cancer  MA-SCREENING MAMMO BILAT W/CAD   13. Nevus of finger of right hand  REFERRAL TO DERMATOLOGY   14. Chronic insomnia  mirtazapine (REMERON) 30 MG Tab tablet   15. Need for vaccination against Streptococcus pneumoniae  Pneumococal Polysaccharide Vaccine 23-Valent =>1YO SQ/IM   16. Left-sided chest wall pain  [id number] (WITH 1-VIEW CXR)   17. Closed fracture of multiple ribs of right side, sequela  [id number] (WITH 1-VIEW CXR)         Followup: Return in about 4 months (around 5/20/2021), or if symptoms worsen or fail to improve.

## 2021-01-25 ENCOUNTER — APPOINTMENT (OUTPATIENT)
Dept: RADIOLOGY | Facility: MEDICAL CENTER | Age: 71
End: 2021-01-25
Attending: FAMILY MEDICINE

## 2021-02-09 ENCOUNTER — HOSPITAL ENCOUNTER (OUTPATIENT)
Dept: RADIOLOGY | Facility: MEDICAL CENTER | Age: 71
End: 2021-02-09
Payer: MEDICARE

## 2021-02-12 ENCOUNTER — HOSPITAL ENCOUNTER (OUTPATIENT)
Dept: LAB | Facility: MEDICAL CENTER | Age: 71
End: 2021-02-12
Attending: FAMILY MEDICINE
Payer: MEDICARE

## 2021-02-12 DIAGNOSIS — I10 ESSENTIAL HYPERTENSION: ICD-10-CM

## 2021-02-12 DIAGNOSIS — I51.81 STRESS-INDUCED CARDIOMYOPATHY: ICD-10-CM

## 2021-02-12 DIAGNOSIS — E55.9 VITAMIN D DEFICIENCY: ICD-10-CM

## 2021-02-12 DIAGNOSIS — M54.10 BILATERAL RADICULAR PAIN: ICD-10-CM

## 2021-02-12 DIAGNOSIS — R74.8 ELEVATED LIVER ENZYMES: ICD-10-CM

## 2021-02-12 DIAGNOSIS — E78.2 MIXED HYPERLIPIDEMIA: ICD-10-CM

## 2021-02-12 LAB
25(OH)D3 SERPL-MCNC: 42 NG/ML (ref 30–100)
ALBUMIN SERPL BCP-MCNC: 4.5 G/DL (ref 3.2–4.9)
ALBUMIN/GLOB SERPL: 1 G/DL
ALP SERPL-CCNC: 92 U/L (ref 30–99)
ALT SERPL-CCNC: 37 U/L (ref 2–50)
ANION GAP SERPL CALC-SCNC: 12 MMOL/L (ref 7–16)
AST SERPL-CCNC: 47 U/L (ref 12–45)
BILIRUB SERPL-MCNC: 0.5 MG/DL (ref 0.1–1.5)
BUN SERPL-MCNC: 12 MG/DL (ref 8–22)
CALCIUM SERPL-MCNC: 9.9 MG/DL (ref 8.5–10.5)
CHLORIDE SERPL-SCNC: 107 MMOL/L (ref 96–112)
CHOLEST SERPL-MCNC: 155 MG/DL (ref 100–199)
CO2 SERPL-SCNC: 24 MMOL/L (ref 20–33)
CREAT SERPL-MCNC: 0.78 MG/DL (ref 0.5–1.4)
ERYTHROCYTE [DISTWIDTH] IN BLOOD BY AUTOMATED COUNT: 45 FL (ref 35.9–50)
GLOBULIN SER CALC-MCNC: 4.5 G/DL (ref 1.9–3.5)
GLUCOSE SERPL-MCNC: 91 MG/DL (ref 65–99)
HCT VFR BLD AUTO: 49.6 % (ref 37–47)
HDLC SERPL-MCNC: 53 MG/DL
HGB BLD-MCNC: 16.7 G/DL (ref 12–16)
LDLC SERPL CALC-MCNC: 73 MG/DL
MCH RBC QN AUTO: 30.9 PG (ref 27–33)
MCHC RBC AUTO-ENTMCNC: 33.7 G/DL (ref 33.6–35)
MCV RBC AUTO: 91.9 FL (ref 81.4–97.8)
PLATELET # BLD AUTO: 246 K/UL (ref 164–446)
PMV BLD AUTO: 10.6 FL (ref 9–12.9)
POTASSIUM SERPL-SCNC: 4.6 MMOL/L (ref 3.6–5.5)
PROT SERPL-MCNC: 9 G/DL (ref 6–8.2)
RBC # BLD AUTO: 5.4 M/UL (ref 4.2–5.4)
SODIUM SERPL-SCNC: 143 MMOL/L (ref 135–145)
TRIGL SERPL-MCNC: 147 MG/DL (ref 0–149)
VIT B12 SERPL-MCNC: 406 PG/ML (ref 211–911)
WBC # BLD AUTO: 8.3 K/UL (ref 4.8–10.8)

## 2021-02-12 PROCEDURE — 82306 VITAMIN D 25 HYDROXY: CPT

## 2021-02-12 PROCEDURE — 36415 COLL VENOUS BLD VENIPUNCTURE: CPT

## 2021-02-12 PROCEDURE — 85027 COMPLETE CBC AUTOMATED: CPT

## 2021-02-12 PROCEDURE — 84207 ASSAY OF VITAMIN B-6: CPT

## 2021-02-12 PROCEDURE — 80053 COMPREHEN METABOLIC PANEL: CPT

## 2021-02-12 PROCEDURE — 82607 VITAMIN B-12: CPT

## 2021-02-12 PROCEDURE — 80061 LIPID PANEL: CPT

## 2021-02-16 LAB — VIT B6 SERPL-MCNC: 27.9 NMOL/L (ref 20–125)

## 2021-02-19 ENCOUNTER — HOSPITAL ENCOUNTER (OUTPATIENT)
Dept: RADIOLOGY | Facility: MEDICAL CENTER | Age: 71
End: 2021-02-19
Attending: FAMILY MEDICINE
Payer: MEDICARE

## 2021-02-19 DIAGNOSIS — Z12.31 ENCOUNTER FOR SCREENING MAMMOGRAM FOR BREAST CANCER: ICD-10-CM

## 2021-02-19 DIAGNOSIS — M81.0 AGE-RELATED OSTEOPOROSIS WITHOUT CURRENT PATHOLOGICAL FRACTURE: ICD-10-CM

## 2021-02-19 DIAGNOSIS — Z78.0 POSTMENOPAUSAL STATUS: ICD-10-CM

## 2021-02-19 PROCEDURE — 77080 DXA BONE DENSITY AXIAL: CPT

## 2021-02-19 PROCEDURE — 77063 BREAST TOMOSYNTHESIS BI: CPT

## 2021-02-23 ENCOUNTER — OFFICE VISIT (OUTPATIENT)
Dept: CARDIOLOGY | Facility: MEDICAL CENTER | Age: 71
End: 2021-02-23
Payer: MEDICARE

## 2021-02-23 VITALS
OXYGEN SATURATION: 97 % | WEIGHT: 152 LBS | SYSTOLIC BLOOD PRESSURE: 146 MMHG | HEART RATE: 77 BPM | HEIGHT: 62 IN | BODY MASS INDEX: 27.97 KG/M2 | DIASTOLIC BLOOD PRESSURE: 78 MMHG

## 2021-02-23 DIAGNOSIS — I51.81 STRESS-INDUCED CARDIOMYOPATHY: ICD-10-CM

## 2021-02-23 DIAGNOSIS — I10 ESSENTIAL HYPERTENSION: ICD-10-CM

## 2021-02-23 PROCEDURE — 99203 OFFICE O/P NEW LOW 30 MIN: CPT | Performed by: INTERNAL MEDICINE

## 2021-02-23 RX ORDER — ENALAPRIL MALEATE 20 MG/1
20 TABLET ORAL 2 TIMES DAILY
Qty: 180 TABLET | Refills: 3 | Status: SHIPPED | OUTPATIENT
Start: 2021-02-23 | End: 2021-03-31 | Stop reason: SDUPTHER

## 2021-02-23 ASSESSMENT — ENCOUNTER SYMPTOMS
DIZZINESS: 0
NAUSEA: 0
CLAUDICATION: 0
BRUISES/BLEEDS EASILY: 0
PALPITATIONS: 0
PND: 0
CHILLS: 0
FALLS: 0
BLURRED VISION: 0
COUGH: 0
FOCAL WEAKNESS: 0
SORE THROAT: 0
ABDOMINAL PAIN: 0
SHORTNESS OF BREATH: 0
WEAKNESS: 0
FEVER: 0

## 2021-02-23 ASSESSMENT — FIBROSIS 4 INDEX: FIB4 SCORE: 2.14

## 2021-02-23 NOTE — PROGRESS NOTES
Chief Complaint   Patient presents with   • HTN (Uncontrolled)     F/V DX:HTN       Subjective:   Karen Dimas is a 70 y.o. female who presents today for evaluation of her history of stress-induced cardiomyopathy I last saw her over 4 years ago she has been doing well we noticed that her blood pressures have been high on the most recent readings over the last year and a half he does not check her blood pressure at home    Past Medical History:   Diagnosis Date   • Cataract     no surgery   • Chronic back pain     and neck   • Dental disorder    • Heart burn    • Hemorrhagic disorder (HCC)     hemorrhaged after hysterectomy   • Hiatus hernia syndrome    • High cholesterol    • Hypertension    • Indigestion    • Myocardial infarct (HCC) 1995    showed up on ekg, sees once a year cardiology   • Psychiatric problem     anxiety/depression   • Stress-induced cardiomyopathy 9/30/2014     Past Surgical History:   Procedure Laterality Date   • CERVICAL DISK AND FUSION ANTERIOR  11/27/2017    Procedure: CERVICAL DISK AND FUSION ANTERIOR C4-7 WITH PLATE;  Surgeon: Prem Bakc M.D.;  Location: SURGERY [facility];  Service: Neurosurgery   • GASTROSCOPY WITH BIOPSY  9/28/2014    Performed by Carlos Ivan M.D. at ENDOSCOPY [facility]   • COLONOSCOPY WITH BIOPSY  9/28/2014    Performed by Carlos Ivan M.D. at ENDOSCOPY [facility]   • CHOLECYSTECTOMY     • GYN SURGERY      Hysterectomy     History reviewed. No pertinent family history.  Social History     Socioeconomic History   • Marital status:      Spouse name: Not on file   • Number of children: Not on file   • Years of education: Not on file   • Highest education level: Not on file   Occupational History   • Not on file   Tobacco Use   • Smoking status: Current Some Day Smoker     Packs/day: 0.50     Years: 20.00     Pack years: 10.00     Types: Cigarettes     Last attempt to quit: 10/24/2017     Years since quitting: 3.3   •  Smokeless tobacco: Never Used   • Tobacco comment: discussed, intermittent, knows she should quit   Substance and Sexual Activity   • Alcohol use: Yes     Comment: around 5 per year   • Drug use: Yes     Types: Marijuana     Comment: marijuana used to relax   • Sexual activity: Not Currently   Other Topics Concern   • Not on file   Social History Narrative   • Not on file     Social Determinants of Health     Financial Resource Strain:    • Difficulty of Paying Living Expenses:    Food Insecurity:    • Worried About Running Out of Food in the Last Year:    • Ran Out of Food in the Last Year:    Transportation Needs:    • Lack of Transportation (Medical):    • Lack of Transportation (Non-Medical):    Physical Activity:    • Days of Exercise per Week:    • Minutes of Exercise per Session:    Stress:    • Feeling of Stress :    Social Connections:    • Frequency of Communication with Friends and Family:    • Frequency of Social Gatherings with Friends and Family:    • Attends [employer] Services:    • Active Member of Clubs or Organizations:    • Attends Club or Organization Meetings:    • Marital Status:    Intimate Partner Violence:    • Fear of Current or Ex-Partner:    • Emotionally Abused:    • Physically Abused:    • Sexually Abused:      Allergies   Allergen Reactions   • Codeine Vomiting     [id number]     Outpatient Encounter Medications as of 2/23/2021   Medication Sig Dispense Refill   • omeprazole (PRILOSEC) 40 MG delayed-release capsule Take 40 mg by mouth every day.     • gabapentin (NEURONTIN) 100 MG Cap Take 1 Cap by mouth 3 times a day. 270 Cap 3   • atorvastatin (LIPITOR) 20 MG Tab Take 1 Tab by mouth every bedtime. 90 Tab 3   • tizanidine (ZANAFLEX) 2 MG tablet Take 1 Tab by mouth every bedtime. 90 Tab 3   • mirtazapine (REMERON) 30 MG Tab tablet Take 1 Tab by mouth every bedtime. 90 Tab 3   • enalapril (VASOTEC) 20 MG tablet Take 1 Tab by mouth every evening. Indications: High Blood Pressure Disorder  "90 Tab 3   • alendronate (FOSAMAX) 70 MG Tab Take 1 Tab by mouth every 7 days. Indications: Osteoporosis 12 Tab 1   • Cholecalciferol (VITAMIN D3) 2000 UNIT Cap Take  by mouth.     • aspirin EC (ECOTRIN) 81 MG Tablet Delayed Response Take 81 mg by mouth every day.       No facility-administered encounter medications on file as of 2/23/2021.     Review of Systems   Constitutional: Negative for chills and fever.   HENT: Negative for sore throat.    Eyes: Negative for blurred vision.   Respiratory: Negative for cough and shortness of breath.    Cardiovascular: Negative for chest pain, palpitations, claudication, leg swelling and PND.   Gastrointestinal: Negative for abdominal pain and nausea.   Musculoskeletal: Negative for falls and joint pain.   Skin: Negative for rash.   Neurological: Negative for dizziness, focal weakness and weakness.   Endo/Heme/Allergies: Does not bruise/bleed easily.        Objective:   /78 (BP Location: Left arm, Patient Position: Sitting, BP Cuff Size: Adult)   Pulse 77   Ht 1.575 m (5' 2\")   Wt 68.9 kg (152 lb)   SpO2 97%   BMI 27.80 kg/m²     Physical Exam   Constitutional: No distress.   HENT:   Patient wearing a mask due to COVID precautions   Eyes: No scleral icterus.   Neck: No JVD present.   Cardiovascular: Normal rate and normal heart sounds. Exam reveals no gallop and no friction rub.   No murmur heard.  Pulmonary/Chest: No respiratory distress. She has no wheezes. She has no rales.   Abdominal: Soft. Bowel sounds are normal.   Musculoskeletal:         General: No edema.   Neurological: She is alert.   Skin: No rash noted. She is not diaphoretic.   Psychiatric: She has a normal mood and affect.     We reviewed in person the most recent labs  Recent Results (from the past 4032 hour(s))   TSH    Collection Time: 09/21/20  2:00 AM   Result Value Ref Range    TSH 1.180 0.380 - 5.330 uIU/mL   Lipid Profile    Collection Time: 09/21/20  2:00 AM   Result Value Ref Range    " Cholesterol,Tot 135 100 - 199 mg/dL    Triglycerides 125 0 - 149 mg/dL    HDL 57 >=40 mg/dL    LDL 53 <100 mg/dL   Comp Metabolic Panel    Collection Time: 09/21/20  2:00 AM   Result Value Ref Range    Sodium 142 135 - 145 mmol/L    Potassium 4.4 3.6 - 5.5 mmol/L    Chloride 107 96 - 112 mmol/L    Co2 23 20 - 33 mmol/L    Anion Gap 12.0 7.0 - 16.0    Glucose 109 (H) 65 - 99 mg/dL    Bun 9 8 - 22 mg/dL    Creatinine 0.75 0.50 - 1.40 mg/dL    Calcium 9.5 8.5 - 10.5 mg/dL    AST(SGOT) 59 (H) 12 - 45 U/L    ALT(SGPT) 52 (H) 2 - 50 U/L    Alkaline Phosphatase 86 30 - 99 U/L    Total Bilirubin 0.5 0.1 - 1.5 mg/dL    Albumin 4.4 3.2 - 4.9 g/dL    Total Protein 8.4 (H) 6.0 - 8.2 g/dL    Globulin 4.0 (H) 1.9 - 3.5 g/dL    A-G Ratio 1.1 g/dL   CBC WITH DIFFERENTIAL    Collection Time: 09/21/20  2:00 AM   Result Value Ref Range    WBC 7.1 4.8 - 10.8 K/uL    RBC 5.03 4.20 - 5.40 M/uL    Hemoglobin 15.8 12.0 - 16.0 g/dL    Hematocrit 47.3 (H) 37.0 - 47.0 %    MCV 94.0 81.4 - 97.8 fL    MCH 31.4 27.0 - 33.0 pg    MCHC 33.4 (L) 33.6 - 35.0 g/dL    RDW 45.0 35.9 - 50.0 fL    Platelet Count 231 164 - 446 K/uL    MPV 10.0 9.0 - 12.9 fL    Neutrophils-Polys 45.60 44.00 - 72.00 %    Lymphocytes 33.30 22.00 - 41.00 %    Monocytes 9.40 0.00 - 13.40 %    Eosinophils 10.10 (H) 0.00 - 6.90 %    Basophils 1.30 0.00 - 1.80 %    Immature Granulocytes 0.30 0.00 - 0.90 %    Nucleated RBC 0.00 /100 WBC    Neutrophils (Absolute) 3.25 2.00 - 7.15 K/uL    Lymphs (Absolute) 2.37 1.00 - 4.80 K/uL    Monos (Absolute) 0.67 0.00 - 0.85 K/uL    Eos (Absolute) 0.72 (H) 0.00 - 0.51 K/uL    Baso (Absolute) 0.09 0.00 - 0.12 K/uL    Immature Granulocytes (abs) 0.02 0.00 - 0.11 K/uL    NRBC (Absolute) 0.00 K/uL   ESTIMATED GFR    Collection Time: 09/21/20  2:00 AM   Result Value Ref Range    GFR If African American >60 >60 mL/min/1.73 m 2    GFR If Non African American >60 >60 mL/min/1.73 m 2   FASTING STATUS    Collection Time: 09/21/20  7:26 AM   Result Value  Ref Range    Fasting Status Fasting    VITAMIN D,25 HYDROXY    Collection Time: 02/12/21 11:33 AM   Result Value Ref Range    25-Hydroxy   Vitamin D 25 42 30 - 100 ng/mL   CBC WITHOUT DIFFERENTIAL    Collection Time: 02/12/21 11:33 AM   Result Value Ref Range    WBC 8.3 4.8 - 10.8 K/uL    RBC 5.40 4.20 - 5.40 M/uL    Hemoglobin 16.7 (H) 12.0 - 16.0 g/dL    Hematocrit 49.6 (H) 37.0 - 47.0 %    MCV 91.9 81.4 - 97.8 fL    MCH 30.9 27.0 - 33.0 pg    MCHC 33.7 33.6 - 35.0 g/dL    RDW 45.0 35.9 - 50.0 fL    Platelet Count 246 164 - 446 K/uL    MPV 10.6 9.0 - 12.9 fL   Lipid Profile    Collection Time: 02/12/21 11:33 AM   Result Value Ref Range    Cholesterol,Tot 155 100 - 199 mg/dL    Triglycerides 147 0 - 149 mg/dL    HDL 53 >=40 mg/dL    LDL 73 <100 mg/dL   Comp Metabolic Panel    Collection Time: 02/12/21 11:33 AM   Result Value Ref Range    Sodium 143 135 - 145 mmol/L    Potassium 4.6 3.6 - 5.5 mmol/L    Chloride 107 96 - 112 mmol/L    Co2 24 20 - 33 mmol/L    Anion Gap 12.0 7.0 - 16.0    Glucose 91 65 - 99 mg/dL    Bun 12 8 - 22 mg/dL    Creatinine 0.78 0.50 - 1.40 mg/dL    Calcium 9.9 8.5 - 10.5 mg/dL    AST(SGOT) 47 (H) 12 - 45 U/L    ALT(SGPT) 37 2 - 50 U/L    Alkaline Phosphatase 92 30 - 99 U/L    Total Bilirubin 0.5 0.1 - 1.5 mg/dL    Albumin 4.5 3.2 - 4.9 g/dL    Total Protein 9.0 (H) 6.0 - 8.2 g/dL    Globulin 4.5 (H) 1.9 - 3.5 g/dL    A-G Ratio 1.0 g/dL   VITAMIN B6    Collection Time: 02/12/21 11:33 AM   Result Value Ref Range    Vitamin B6 27.9 20.0 - 125.0 nmol/L   VITAMIN B12    Collection Time: 02/12/21 11:33 AM   Result Value Ref Range    Vitamin B12 -True Cobalamin 406 211 - 911 pg/mL   ESTIMATED GFR    Collection Time: 02/12/21 11:33 AM   Result Value Ref Range    GFR If African American >60 >60 mL/min/1.73 m 2    GFR If Non African American >60 >60 mL/min/1.73 m 2       Assessment:     1. Essential hypertension     2. Stress-induced cardiomyopathy         Medical Decision Making:  Today's Assessment  / Status / Plan:     It was my pleasure to meet with Ms. Dimas.    Blood pressure is not well controlled.  We specifically assessed the labs on hypertension treatment  We will have her increase her enalapril to twice daily    She is done well for history of stress-induced cardiomyopathy she will continue to monitor for symptoms    I will see Ms. Dimas back in 2-3 year time and encouraged her to follow up with us over the phone or electronically using my MyChart as issues arise.    It is my pleasure to participate in the care of Ms. Dimas.  Please do not hesitate to contact me with questions or concerns.    Patricio Estrella MD PhD [facility]  Cardiologist [facility]    Please note that this dictation was created using voice recognition software. There may be errors I did not discover before finalizing the note.

## 2021-03-03 ENCOUNTER — TELEPHONE (OUTPATIENT)
Dept: CARDIOLOGY | Facility: MEDICAL CENTER | Age: 71
End: 2021-03-03

## 2021-03-03 DIAGNOSIS — Z23 NEED FOR VACCINATION: ICD-10-CM

## 2021-03-03 NOTE — TELEPHONE ENCOUNTER
CW    Pt called stating her prescription for enalapril was sent to the wrong pharmacy. Pt is asking it be sent to   [facility]  [address] [place] [address].   Ph # [phone].

## 2021-03-03 NOTE — TELEPHONE ENCOUNTER
Upon chart review, medication was sent to [facility] - [facility].    Returned pt call and reviewed findings.  Encourage pt to call her preferred pharmacy to have rx transferred.  She verbalizes understanding and states no other concerns or questions at this time.  Pt is appreciative of information given.

## 2021-03-09 ENCOUNTER — HOSPITAL ENCOUNTER (OUTPATIENT)
Dept: RADIOLOGY | Facility: MEDICAL CENTER | Age: 71
End: 2021-03-09
Attending: FAMILY MEDICINE
Payer: MEDICARE

## 2021-03-09 DIAGNOSIS — R07.89 LEFT-SIDED CHEST WALL PAIN: ICD-10-CM

## 2021-03-09 DIAGNOSIS — M50.00 INTERVERTEBRAL CERVICAL DISC DISORDER WITH MYELOPATHY, CERVICAL REGION: ICD-10-CM

## 2021-03-09 DIAGNOSIS — S22.41XS CLOSED FRACTURE OF MULTIPLE RIBS OF RIGHT SIDE, SEQUELA: ICD-10-CM

## 2021-03-09 DIAGNOSIS — M54.10 BILATERAL RADICULAR PAIN: ICD-10-CM

## 2021-03-09 PROCEDURE — 71111 X-RAY EXAM RIBS/CHEST4/> VWS: CPT

## 2021-03-09 PROCEDURE — 72100 X-RAY EXAM L-S SPINE 2/3 VWS: CPT

## 2021-03-09 PROCEDURE — 72040 X-RAY EXAM NECK SPINE 2-3 VW: CPT

## 2021-03-24 ENCOUNTER — IMMUNIZATION (OUTPATIENT)
Dept: FAMILY PLANNING/WOMEN'S HEALTH CLINIC | Facility: IMMUNIZATION CENTER | Age: 71
End: 2021-03-24
Attending: INTERNAL MEDICINE
Payer: MEDICARE

## 2021-03-24 DIAGNOSIS — Z23 NEED FOR VACCINATION: ICD-10-CM

## 2021-03-24 DIAGNOSIS — Z23 ENCOUNTER FOR VACCINATION: Primary | ICD-10-CM

## 2021-03-24 PROCEDURE — 91300 PFIZER SARS-COV-2 VACCINE: CPT

## 2021-03-24 PROCEDURE — 0001A PFIZER SARS-COV-2 VACCINE: CPT

## 2021-03-31 DIAGNOSIS — I51.81 STRESS-INDUCED CARDIOMYOPATHY: ICD-10-CM

## 2021-03-31 DIAGNOSIS — I10 ESSENTIAL HYPERTENSION: ICD-10-CM

## 2021-04-02 RX ORDER — ENALAPRIL MALEATE 20 MG/1
20 TABLET ORAL 2 TIMES DAILY
Qty: 200 TABLET | Refills: 3 | Status: SHIPPED | OUTPATIENT
Start: 2021-04-02 | End: 2021-04-06 | Stop reason: SDUPTHER

## 2021-04-06 ENCOUNTER — OFFICE VISIT (OUTPATIENT)
Dept: MEDICAL GROUP | Facility: MEDICAL CENTER | Age: 71
End: 2021-04-06
Payer: MEDICARE

## 2021-04-06 VITALS
DIASTOLIC BLOOD PRESSURE: 78 MMHG | HEIGHT: 62 IN | OXYGEN SATURATION: 98 % | WEIGHT: 154 LBS | HEART RATE: 90 BPM | TEMPERATURE: 98.7 F | RESPIRATION RATE: 16 BRPM | SYSTOLIC BLOOD PRESSURE: 124 MMHG | BODY MASS INDEX: 28.34 KG/M2

## 2021-04-06 DIAGNOSIS — M50.00 INTERVERTEBRAL CERVICAL DISC DISORDER WITH MYELOPATHY, CERVICAL REGION: ICD-10-CM

## 2021-04-06 DIAGNOSIS — K29.30 CHRONIC SUPERFICIAL GASTRITIS WITHOUT BLEEDING: ICD-10-CM

## 2021-04-06 DIAGNOSIS — E78.2 MIXED HYPERLIPIDEMIA: ICD-10-CM

## 2021-04-06 DIAGNOSIS — M62.838 MUSCLE SPASM: ICD-10-CM

## 2021-04-06 DIAGNOSIS — M81.0 AGE-RELATED OSTEOPOROSIS WITHOUT CURRENT PATHOLOGICAL FRACTURE: ICD-10-CM

## 2021-04-06 DIAGNOSIS — I51.81 STRESS-INDUCED CARDIOMYOPATHY: ICD-10-CM

## 2021-04-06 DIAGNOSIS — R74.01 ELEVATED TRANSAMINASE LEVEL: ICD-10-CM

## 2021-04-06 DIAGNOSIS — F51.04 CHRONIC INSOMNIA: ICD-10-CM

## 2021-04-06 DIAGNOSIS — I10 ESSENTIAL HYPERTENSION: ICD-10-CM

## 2021-04-06 PROCEDURE — 99214 OFFICE O/P EST MOD 30 MIN: CPT | Performed by: FAMILY MEDICINE

## 2021-04-06 RX ORDER — GABAPENTIN 100 MG/1
100 CAPSULE ORAL 3 TIMES DAILY
Qty: 270 CAPSULE | Refills: 3 | Status: SHIPPED | OUTPATIENT
Start: 2021-04-06 | End: 2021-05-13

## 2021-04-06 RX ORDER — PANTOPRAZOLE SODIUM 40 MG/1
40 TABLET, DELAYED RELEASE ORAL DAILY
Qty: 90 TABLET | Refills: 3 | Status: SHIPPED | OUTPATIENT
Start: 2021-04-06 | End: 2021-04-06 | Stop reason: SDUPTHER

## 2021-04-06 RX ORDER — ENALAPRIL MALEATE 20 MG/1
20 TABLET ORAL 2 TIMES DAILY
Qty: 200 TABLET | Refills: 3 | Status: SHIPPED | OUTPATIENT
Start: 2021-04-06 | End: 2022-03-01

## 2021-04-06 RX ORDER — PANTOPRAZOLE SODIUM 40 MG/1
40 TABLET, DELAYED RELEASE ORAL DAILY
Qty: 90 TABLET | Refills: 3 | Status: SHIPPED | OUTPATIENT
Start: 2021-04-06 | End: 2021-06-14

## 2021-04-06 RX ORDER — TIZANIDINE 2 MG/1
2 TABLET ORAL
Qty: 90 TABLET | Refills: 3 | Status: SHIPPED | OUTPATIENT
Start: 2021-04-06 | End: 2021-05-13

## 2021-04-06 RX ORDER — ATORVASTATIN CALCIUM 20 MG/1
20 TABLET, FILM COATED ORAL
Qty: 90 TABLET | Refills: 3 | Status: SHIPPED | OUTPATIENT
Start: 2021-04-06 | End: 2021-05-13

## 2021-04-06 RX ORDER — MIRTAZAPINE 30 MG/1
30 TABLET, FILM COATED ORAL
Qty: 90 TABLET | Refills: 3 | Status: SHIPPED | OUTPATIENT
Start: 2021-04-06 | End: 2021-05-13

## 2021-04-06 ASSESSMENT — FIBROSIS 4 INDEX: FIB4 SCORE: 2.14

## 2021-04-06 NOTE — NON-PROVIDER
1.  Are you currently engaging in any exercise or physical activity? Yes    2.  How would you describe your mood or emotional well-being today? wonderful    3.  Have you had any falls in the last year? No    4.  Have you noticed any problems with your balance or had difficulty walking? No    5.  In the last six months have you experienced any leakage of urine? No    6. DPA/Advanced Directive: Patient does not have an Advanced Directive.  A packet and workshop information was given on Advanced Directives.

## 2021-04-06 NOTE — PROGRESS NOTES
Chief Complaint   Patient presents with   • Abdominal Pain   • Osteoporosis   • Neck Pain       Subjective:     HPI:   Karen Dimas presents today with the followin. Chronic superficial gastritis without bleeding  Patient is having symptoms consistent with gastritis.  She has been taking omeprazole which does not seem to be adequate for her.  She did indeed have gastritis proven on endoscopy.  Denies hematemesis.  She is having some bloating.  I have changed her to pantoprazole.  However, I feel that one of the major issues here is the alendronate.    2. Age-related osteoporosis without current pathological fracture  Patient has been on alendronate a little over 4 years, she feels that it was about .  I have asked her to go ahead and discontinue this.  I believe it is contributing to her GI problems.    3. Essential hypertension  HTN - Chronic condition stable. Currently taking all meds as directed.   She is taking baby aspirin daily.   She is monitoring BP at home.   Denies symptoms low BP: light-headed, tunnel-vision, unusual fatigue.   Denies symptoms high BP:pounding headache, visual changes, palpitations, flushed face.   Denies medicine side effects: unusual fatigue, slow heartbeat, foot/leg swelling, cough.  She tolerates the enalapril well.    4. Stress-induced cardiomyopathy  Patient has history of stress-induced cardiomyopathy.  She is on enalapril to help stabilize her heart and help it heal.      5. Intervertebral cervical disc disorder with myelopathy, cervical region  Patient is taking gabapentin for her cervical disc disorder.  This is controlling her symptoms fairly well.    6. Mixed hyperlipidemia  Patient denies chest pain, chest pressure, palpitations or exertional shortness of breath. Patient denies muscle aches or muscle weakness from the atorvastatin medication. Patient is a current some day smoker.  She knows she needs to stop this.  Patient takes 81 mg aspirin daily.  Patient has no history of myocardial infarction, stroke or PVD.  She does have history of stress-induced cardiomyopathy.  Patient has excellent control on this medication.    7. Muscle spasm  Patient states the tizanidine is very helpful for her muscle spasm.  This is renewed.    8. Chronic insomnia  Patient is stable on mirtazapine for her chronic insomnia.  This is renewed.    9. Elevated transaminase level  Patient continues to have mildly elevated transaminases.  Abdominal ultrasound discussed and order placed.  - US-ABDOMEN LTD (SOFT TISSUE); Future      Patient Active Problem List    Diagnosis Date Noted   • Atypical chest pain 12/18/2016   • Transaminitis 12/19/2016   • Hyperglobulinemia 12/19/2016   • Stress-induced cardiomyopathy 09/30/2014   • Microscopic colitis 09/28/2014   • Hypophosphatemia 09/26/2014   • Essential hypertension 09/26/2014   • Gastritis 09/25/2014   • Osteoporosis 12/18/2016   • Anxiety 09/29/2014   • Insomnia 09/26/2014   • Bilateral radicular pain 01/20/2021   • Muscle spasm 01/20/2021   • Mixed hyperlipidemia 01/20/2021   • Elevated liver enzymes 01/20/2021   • Vitamin D deficiency 01/20/2021   • Nevus of finger of right hand 01/20/2021   • Intervertebral cervical disc disorder with myelopathy, cervical region 11/29/2017   • Weakness 07/31/2015   • Elevated troponin 09/29/2014       Current medicines (including changes today)  Current Outpatient Medications   Medication Sig Dispense Refill   • pantoprazole (PROTONIX) 40 MG Tablet Delayed Response Take 1 tablet by mouth every day. 90 tablet 3   • enalapril (VASOTEC) 20 MG tablet Take 1 tablet by mouth 2 times a day. Indications: High Blood Pressure Disorder 200 tablet 3   • gabapentin (NEURONTIN) 100 MG Cap Take 1 capsule by mouth 3 times a day. 270 capsule 3   • atorvastatin (LIPITOR) 20 MG Tab Take 1 tablet by mouth at bedtime. 90 tablet 3   • tizanidine (ZANAFLEX) 2 MG tablet Take 1 tablet by mouth at bedtime. 90 tablet 3   •  "mirtazapine (REMERON) 30 MG Tab tablet Take 1 tablet by mouth at bedtime. 90 tablet 3   • Cholecalciferol (VITAMIN D3) 2000 UNIT Cap Take  by mouth.     • aspirin EC (ECOTRIN) 81 MG Tablet Delayed Response Take 81 mg by mouth every day.       No current facility-administered medications for this visit.       Allergies   Allergen Reactions   • Codeine Vomiting     [id number]       ROS: As per HPI       Objective:     /78   Pulse 90   Temp 37.1 °C (98.7 °F)   Resp 16   Ht 1.575 m (5' 2\")   Wt 69.9 kg (154 lb)   SpO2 98%  Body mass index is 28.17 kg/m².    Physical Exam:  Constitutional: Well-developed and well-nourished. Not diaphoretic. No distress. Lucid and fluent.  Patient, physician and staff all wearing masks.  Skin: Skin is warm and dry. No rash noted.  Head: Atraumatic without lesions.  Eyes: Conjunctivae and extraocular motions are normal. Pupils are equal, round, and reactive to light. No scleral icterus.   Ears:  External ears unremarkable.   Neck: Supple, trachea midline. No thyromegaly present. No cervical or supraclavicular lymphadenopathy. No JVD or carotid bruits appreciated  Cardiovascular: Regular rate and rhythm.  Normal S1, S2 without murmur appreciated.  Chest: Effort normal. Clear to auscultation throughout. No adventitious sounds.   Abdomen: Soft, non tender, and without distention. Active bowel sounds in all four quadrants. No rebound, guarding, masses or hepatosplenomegaly.  Extremities: No cyanosis, clubbing, erythema, nor edema.   Neurological: Alert and oriented x 3.  Psychiatric:  Behavior, mood, and affect are appropriate.    Lab results from February reviewed and discussed in detail.  Liver enzyme elevation is much improved but still not to goal.  Cholesterol results are excellent.     Assessment and Plan:     70 y.o. female with the following issues:    1. Chronic superficial gastritis without bleeding  pantoprazole (PROTONIX) 40 MG Tablet Delayed Response    DISCONTINUED: " pantoprazole (PROTONIX) 40 MG Tablet Delayed Response   2. Age-related osteoporosis without current pathological fracture     3. Essential hypertension  enalapril (VASOTEC) 20 MG tablet   4. Stress-induced cardiomyopathy  enalapril (VASOTEC) 20 MG tablet   5. Intervertebral cervical disc disorder with myelopathy, cervical region  gabapentin (NEURONTIN) 100 MG Cap   6. Mixed hyperlipidemia  atorvastatin (LIPITOR) 20 MG Tab   7. Muscle spasm  tizanidine (ZANAFLEX) 2 MG tablet   8. Chronic insomnia  mirtazapine (REMERON) 30 MG Tab tablet   9. Elevated transaminase level  US-ABDOMEN LTD (SOFT TISSUE)         Followup: Return in about 4 months (around 8/6/2021), or if symptoms worsen or fail to improve.

## 2021-04-07 ENCOUNTER — TELEPHONE (OUTPATIENT)
Dept: MEDICAL GROUP | Facility: MEDICAL CENTER | Age: 71
End: 2021-04-07

## 2021-04-16 ENCOUNTER — IMMUNIZATION (OUTPATIENT)
Dept: FAMILY PLANNING/WOMEN'S HEALTH CLINIC | Facility: IMMUNIZATION CENTER | Age: 71
End: 2021-04-16
Attending: INTERNAL MEDICINE
Payer: MEDICARE

## 2021-04-16 DIAGNOSIS — Z23 ENCOUNTER FOR VACCINATION: Primary | ICD-10-CM

## 2021-04-16 PROCEDURE — 0002A PFIZER SARS-COV-2 VACCINE: CPT

## 2021-04-16 PROCEDURE — 91300 PFIZER SARS-COV-2 VACCINE: CPT

## 2021-04-20 ENCOUNTER — HOSPITAL ENCOUNTER (OUTPATIENT)
Dept: RADIOLOGY | Facility: MEDICAL CENTER | Age: 71
End: 2021-04-20
Attending: FAMILY MEDICINE
Payer: MEDICARE

## 2021-04-20 DIAGNOSIS — R74.01 ELEVATED TRANSAMINASE LEVEL: ICD-10-CM

## 2021-04-20 PROCEDURE — 76705 ECHO EXAM OF ABDOMEN: CPT

## 2021-05-04 ENCOUNTER — TELEPHONE (OUTPATIENT)
Dept: MEDICAL GROUP | Facility: MEDICAL CENTER | Age: 71
End: 2021-05-04

## 2021-05-04 NOTE — TELEPHONE ENCOUNTER
ESTABLISHED PATIENT PRE-VISIT PLANNING     Patient was NOT contacted to complete PVP.     Note: Patient will not be contacted if there is no indication to call.     1.  Reviewed notes from the last few office visits within the medical group: Yes    2.  If any orders were placed at last visit or intended to be done for this visit (i.e. 6 mos follow-up), do we have Results/Consult Notes?         •  Labs - Labs were not ordered at last office visit.  Note: If patient appointment is for lab review and patient did not complete labs, check with provider if OK to reschedule patient until labs completed.       •  Imaging - Imaging ordered, completed and results are in chart.       •  Referrals - No referrals were ordered at last office visit.    3. Is this appointment scheduled as a Hospital Follow-Up? No    4.  Immunizations were updated in Epic using Reconcile Outside Information activity? Yes    5.  Patient is due for the following Health Maintenance Topics:   Health Maintenance Due   Topic Date Due   • Annual Wellness Visit  Never done         6.  AHA (Pulse8) form printed for Provider? Yes

## 2021-05-13 ENCOUNTER — OFFICE VISIT (OUTPATIENT)
Dept: MEDICAL GROUP | Facility: MEDICAL CENTER | Age: 71
End: 2021-05-13
Payer: MEDICARE

## 2021-05-13 VITALS
TEMPERATURE: 97.7 F | HEIGHT: 62 IN | BODY MASS INDEX: 27.05 KG/M2 | HEART RATE: 85 BPM | WEIGHT: 147 LBS | DIASTOLIC BLOOD PRESSURE: 72 MMHG | SYSTOLIC BLOOD PRESSURE: 118 MMHG | OXYGEN SATURATION: 98 % | RESPIRATION RATE: 16 BRPM

## 2021-05-13 DIAGNOSIS — R77.1 HYPERGLOBULINEMIA: ICD-10-CM

## 2021-05-13 DIAGNOSIS — R74.01 TRANSAMINITIS: ICD-10-CM

## 2021-05-13 DIAGNOSIS — Z00.00 MEDICARE ANNUAL WELLNESS VISIT, SUBSEQUENT: ICD-10-CM

## 2021-05-13 DIAGNOSIS — R74.8 ELEVATED LIVER ENZYMES: ICD-10-CM

## 2021-05-13 DIAGNOSIS — F51.01 PRIMARY INSOMNIA: ICD-10-CM

## 2021-05-13 DIAGNOSIS — I10 ESSENTIAL HYPERTENSION: ICD-10-CM

## 2021-05-13 DIAGNOSIS — R93.2 ABNORMAL ULTRASOUND OF LIVER: ICD-10-CM

## 2021-05-13 DIAGNOSIS — M50.00 INTERVERTEBRAL CERVICAL DISC DISORDER WITH MYELOPATHY, CERVICAL REGION: ICD-10-CM

## 2021-05-13 DIAGNOSIS — M54.10 BILATERAL RADICULAR PAIN: ICD-10-CM

## 2021-05-13 DIAGNOSIS — Z87.19 HISTORY OF BARRETT'S ESOPHAGUS: ICD-10-CM

## 2021-05-13 DIAGNOSIS — I51.81 STRESS-INDUCED CARDIOMYOPATHY: ICD-10-CM

## 2021-05-13 DIAGNOSIS — K29.30 CHRONIC SUPERFICIAL GASTRITIS WITHOUT BLEEDING: ICD-10-CM

## 2021-05-13 DIAGNOSIS — M62.838 MUSCLE SPASM: ICD-10-CM

## 2021-05-13 DIAGNOSIS — E55.9 VITAMIN D DEFICIENCY: ICD-10-CM

## 2021-05-13 DIAGNOSIS — F41.9 ANXIETY: ICD-10-CM

## 2021-05-13 DIAGNOSIS — E78.2 MIXED HYPERLIPIDEMIA: ICD-10-CM

## 2021-05-13 DIAGNOSIS — M81.0 AGE-RELATED OSTEOPOROSIS WITHOUT CURRENT PATHOLOGICAL FRACTURE: ICD-10-CM

## 2021-05-13 PROBLEM — D22.61: Status: RESOLVED | Noted: 2021-01-20 | Resolved: 2021-05-13

## 2021-05-13 PROCEDURE — G0438 PPPS, INITIAL VISIT: HCPCS | Performed by: FAMILY MEDICINE

## 2021-05-13 RX ORDER — TIZANIDINE 2 MG/1
4 TABLET ORAL 2 TIMES DAILY
Qty: 120 TABLET | Refills: 11 | Status: SHIPPED | OUTPATIENT
Start: 2021-05-13 | End: 2021-11-30

## 2021-05-13 ASSESSMENT — FIBROSIS 4 INDEX: FIB4 SCORE: 2.14

## 2021-05-13 NOTE — PROGRESS NOTES
Chief Complaint   Patient presents with   • Annual Wellness Visit       HPI:  Karen Dimas is a 70 y.o. here for Medicare Annual Wellness Visit     Patient Active Problem List    Diagnosis Date Noted   • History of Gómez's esophagus 05/13/2021   • Abnormal ultrasound of liver 05/13/2021   • Bilateral radicular pain 01/20/2021   • Muscle spasm 01/20/2021   • Mixed hyperlipidemia 01/20/2021   • Elevated liver enzymes 01/20/2021   • Vitamin D deficiency 01/20/2021   • Intervertebral cervical disc disorder with myelopathy, cervical region 11/29/2017   • Transaminitis 12/19/2016   • Hyperglobulinemia 12/19/2016   • Osteoporosis 12/18/2016   • Weakness 07/31/2015   • Stress-induced cardiomyopathy 09/30/2014   • Anxiety 09/29/2014   • Microscopic colitis 09/28/2014   • Essential hypertension 09/26/2014   • Insomnia 09/26/2014   • Gastritis 09/25/2014       Current Outpatient Medications   Medication Sig Dispense Refill   • tizanidine (ZANAFLEX) 2 MG tablet Take 2 Tablets by mouth 2 times a day. 120 tablet 11   • pantoprazole (PROTONIX) 40 MG Tablet Delayed Response Take 1 tablet by mouth every day. 90 tablet 3   • enalapril (VASOTEC) 20 MG tablet Take 1 tablet by mouth 2 times a day. Indications: High Blood Pressure Disorder 200 tablet 3   • Cholecalciferol (VITAMIN D3) 2000 UNIT Cap Take  by mouth.       No current facility-administered medications for this visit.          Current supplements as per medication list.     Allergies: Codeine    Current social contact/activities: she is active with family     She  reports that she has been smoking cigarettes. She has a 10.00 pack-year smoking history. She has never used smokeless tobacco. She reports previous alcohol use. She reports current drug use. Drug: Marijuana.  Ready to quit: No  Counseling given: Yes  Comment: discussed, intermittent, knows she should quit      DPA/Advanced Directive:  Patient does not have an Advanced Directive.  A packet and workshop  information was given on Advanced Directives.    ROS:    Gait: Uses no assistive device  Ostomy: No  Other tubes: No  Amputations: No  Chronic oxygen use: No  Last eye exam: 4/2021  Wears hearing aids: No   : Denies any urinary leakage during the last 6 months    Screening:  Up to date.  Shingles vaccine in a few months.      Depression Screening    Little interest or pleasure in doing things?   1   Feeling down, depressed , or hopeless?   1  Trouble falling or staying asleep, or sleeping too much?   2   Feeling tired or having little energy?   2  Poor appetite or overeating?   2  Feeling bad about yourself - or that you are a failure or have let yourself or your family down?  no   Trouble concentrating on things, such as reading the newspaper or watching television?  no  Moving or speaking so slowly that other people could have noticed.  Or the opposite - being so fidgety or restless that you have been moving around a lot more than usual?    no  Thoughts that you would be better off dead, or of hurting yourself?    no   Patient Health Questionnaire Score:   8    If depressive symptoms identified deferred to follow up visit unless specifically addressed in assessment and plan.    Interpretation of PHQ-9 Total Score   Score Severity   1-4 No Depression   5-9 Mild Depression   10-14 Moderate Depression   15-19 Moderately Severe Depression   20-27 Severe Depression      Screening for Cognitive Impairment    Three Minute Recall (captain, garden, picture)  3/3    Julio clock face with all 12 numbers and set the hands to show 5 past 8.    5/5    Cognitive concerns identified deferred for follow up unless specifically addressed in assessment and plan.    Fall Risk Assessment    Has the patient had two or more falls in the last year or any fall with injury in the last year?   no    Safety Assessment    Throw rugs on floor.   yes, discussed  Handrails on all stairs.   yes  Good lighting in all hallways.   yes  Difficulty  hearing.   no  Patient counseled about all safety risks that were identified.    Functional Assessment ADLs    Are there any barriers preventing you from cooking for yourself or meeting nutritional needs?   .no    Are there any barriers preventing you from driving safely or obtaining transportation?   .no    Are there any barriers preventing you from using a telephone or calling for help?   .no    Are there any barriers preventing you from shopping?   no.    Are there any barriers preventing you from taking care of your own finances?   .no    Are there any barriers preventing you from managing your medications?   .no    Are there any barriers preventing you from showering, bathing or dressing yourself?  .no    Are you currently engaging in any exercise or physical activity?   .no     What is your perception of your health?   .good    Health Maintenance Summary                IMM ZOSTER VACCINES Postponed 6/20/2021 Originally 11/18/2002. System: vaccine not available, other system reasons    MAMMOGRAM Next Due 2/19/2022      Done 2/19/2021 MA-SCREENING MAMMO BILAT W/TOMOSYNTHESIS W/CAD     Patient has more history with this topic...    BONE DENSITY Next Due 2/19/2026      Done 2/19/2021 DS-BONE DENSITY STUDY (DEXA)    IMM DTaP/Tdap/Td Vaccine Next Due 4/28/2027      Done 4/28/2017 Imm Admin: Tdap Vaccine    COLONOSCOPY Next Due 3/5/2031      Done 3/5/2021 [facility]     Patient has more history with this topic...          Patient Care Team:  Ryan De Jesus M.D. as PCP - General (Family Medicine)  [facility] as Home Health Provider  Zach Loja P.A.-C. as Consulting Physician (Gastroenterology)      Social History     Tobacco Use   • Smoking status: Current Some Day Smoker     Packs/day: 0.50     Years: 20.00     Pack years: 10.00     Types: Cigarettes     Last attempt to quit: 10/24/2017     Years since quitting: 3.5   • Smokeless tobacco: Never Used   • Tobacco comment: discussed, intermittent,  "knows she should quit   Vaping Use   • Vaping Use: Never used   Substance Use Topics   • Alcohol use: Not Currently     Comment: no more at all, rare in the past   • Drug use: Yes     Types: Marijuana     Comment: marijuana used to relax     History reviewed. No pertinent family history.  She  has a past medical history of Cataract, Chronic back pain, Dental disorder, Heart burn, Hemorrhagic disorder (HCC), Hiatus hernia syndrome, High cholesterol, Hypertension, Indigestion, Myocardial infarct (HCC) (1995), Nevus of finger of right hand (1/20/2021), Psychiatric problem, and Stress-induced cardiomyopathy (9/30/2014).   Past Surgical History:   Procedure Laterality Date   • CERVICAL DISK AND FUSION ANTERIOR  11/27/2017    Procedure: CERVICAL DISK AND FUSION ANTERIOR C4-7 WITH PLATE;  Surgeon: Prem Back M.D.;  Location: SURGERY [facility];  Service: Neurosurgery   • GASTROSCOPY WITH BIOPSY  9/28/2014    Performed by Carlos Ivan M.D. at ENDOSCOPY [facility]   • COLONOSCOPY WITH BIOPSY  9/28/2014    Performed by Carlos Ivan M.D. at ENDOSCOPY [facility]   • CHOLECYSTECTOMY     • GYN SURGERY      Hysterectomy       Exam:   /72   Pulse 85   Temp 36.5 °C (97.7 °F)   Resp 16   Ht 1.575 m (5' 2\")   Wt 66.7 kg (147 lb)   SpO2 98%  Body mass index is 26.89 kg/m².    Hearing good.    Dentition not examined as patient, physician and staff all wearing masks  Alert, oriented in no acute distress.  Eye contact is good, speech goal directed, affect calm  HEENT:   EOMI, PERRLA.     Neck:       Full range of motion. No JVD or carotid bruits appreciated. No cervical adenopathy appreciated. No thyromegaly or neck masses appreciated.  No retractions appreciated.  Lungs:     Clear to auscultation A&P with good air movement.   Heart:      Regular rate and rhythm normal S1 and S2 without murmur appreciated.  Strong and symmetric radial and DP pulses.  Abd:        Soft, bowel sounds " positive, nontender. No bloating noted. No hepatosplenomegaly or mass appreciated. No pulsatile mass appreciated.  Ext:         Extremities show symmetric and full range of motion with normal strength. No cyanosis or clubbing appreciated. No peripheral edema appreciated.  Neuro:    Gait is normal. Patient is lucid, fluent and appropriate. No significant tremor appreciated.  Skin:       Exhibit no rashes, pigmented lesions or ulcerations.    Abdominal US showed a nodular liver.  Patient is a very rare drinker.  She has had no episodes of heavy drinking in her life.  She is not aware of any exposure to liver toxins.       Assessment and Plan. The following treatment and monitoring plan is recommended:      1. Medicare annual wellness visit, subsequent  Her medical problems are generally stable though some may be serious.    2. Muscle spasm  Patient does have muscle spasms.  She has chronic pain.  Previously the gabapentin was helpful even at low-dose.  She has stopped this as it has a potential for liver problems..  She asks to use the low-dose tizanidine 4 times daily which helps her significantly.  This is renewed.  Patient states the spasms have been overall stable.  - tizanidine (ZANAFLEX) 2 MG tablet; Take 2 Tablets by mouth 2 times a day.  Dispense: 120 tablet; Refill: 11    3. Elevated liver enzymes/Transaminitis/Abnormal ultrasound of liver  Patient has had mild to moderately elevated liver enzymes intermittently over the last several years.  On her recent exam and ultrasound was done with the expectation of finding fatty liver.  There was no fatty liver determined at ultrasound.  However, the liver appeared nodular in appearance without liver masses.  Patient has been referred to GI for further evaluation and treatment.  Further lab orders are discussed and placed including serum AFP tumor marker, ceruloplasmin and ferritin iron TIBC and JARRETT and hepatitis B testing.  Patient has had hepatitis C testing in the  past which was negative.  She does have history of a transfusion in the 1980s for gynecologic bleeding.  She has no history of IV drug use.  This is a relatively longstanding but recently discovered problem.  She has an upcoming GI evaluation soon.  Stable.  - SPEP W/REFLEX TO MANDA, A, G, M; Future  - CERULOPLASMIN; Future  - FERRITIN; Future  - IRON/TOTAL IRON BIND; Future  - JARRETT REFLEXIVE PROFILE; Future  - HEPATITIS B SURFACE ANTIGEN; Future    4. Hyperglobulinemia  Patient does have elevated total protein.  Serum globulin is elevated.  Serum protein electrophoresis order is discussed and placed.  On review patient has had mild protein elevation for a significant period of time.  Overall stable problem.  - SPEP W/REFLEX TO MANDA, A, G, M; Future    5. Chronic superficial gastritis without bleeding/ history of Gómez's esophagus  Patient had biopsies in 2019 which showed chronic superficial gastritis and Gómez's esophageal changes without dysplasia.  She also had microscopic colitis.  Patient states she had recent colonoscopy and EGD but we did not receive those results.  She will ask GI to forward those.  Currently denies dysphagia, hematemesis, throat or abdominal pain.  Stable problem.    6. Anxiety  Patient has chronic anxiety.  This situation is of course exacerbating that.  Patient was taking mirtazapine but actually did not feel it was all that helpful.  She will continue to treat this with activity and exercise.  Stable problem.    7. Vitamin D deficiency/ Age-related osteoporosis without current pathological fracture  Recent bone density shows osteopenia of the lumbar spine and osteoporosis of the femur.  Patient has had no fracture.  Patient is taking vitamin D regularly.  She was in the past vitamin D deficient.  Most recent vitamin D testing in February shows normalization on this supplementation.  She will continue.  Stable.    8. Intervertebral cervical disc disorder with myelopathy, cervical  region  Patient does have cervical disc disorder with myelopathy.  She is post surgery and fusion which has been quite helpful.  Patient denies weakness or cervical neuropathy at this time.  Stable problem.    9. Essential hypertension  HTN - Chronic condition stable. Currently taking all meds as directed.   She is not taking baby aspirin daily.   She is not monitoring BP at home.  Blood pressure was excellent here today.  Denies symptoms low BP: light-headed, tunnel-vision, unusual fatigue.   Denies symptoms high BP:pounding headache, visual changes, palpitations, flushed face.   Denies medicine side effects: unusual fatigue, slow heartbeat, foot/leg swelling, cough.    10. Stress-induced cardiomyopathy/Mixed hyperlipidemia  Patient has a history of MI and stress induced cardiomyopathy from many years ago in 1995 upon hearing of her son's murder.  She has had no further cardiac events.  She was taking lipid-lowering medications but she has stopped this with her recent elevated liver enzymes as this may be a culprit.  Patient denies chest pain, chest pressure, palpitations or exertional shortness of breath.  Patient is a current smoker.  She is working on stopping smoking but has found that to be very difficult with her current stress.  Patient has no history of documented peripheral arterial disease.  This has been a very stable problem for many years..    11. Bilateral radicular pain  Patient continues to have radicular pain.  She feels the tizanidine works as well for her as the gabapentin does so she has stopped the gabapentin due to possible liver pathology from that medication.  Patient will continue walking and stretching.  Stable problem.    12. Primary insomnia  Patient has longtime primary insomnia and was on mirtazapine for this.  Since that can possibly also affect the liver she has stopped that.  Patient states the tizanidine is just is helpful and she would prefer to take that at bedtime.  Stable  problem.    Has GI appointment in early June      Services suggested: No services needed at this time  Health Care Screening: Age-appropriate preventive services recommended by USPTF and ACIP covered by Medicare were discussed today. Services ordered if indicated and agreed upon by the patient.  Referrals offered: Community-based lifestyle interventions to reduce health risks and promote self-management and wellness, fall prevention, nutrition, physical activity, tobacco-use cessation, weight loss, and mental health services as per orders if indicated.    Discussion today about general wellness and lifestyle habits:  discussed  · Prevent falls and reduce trip hazards; Cautioned about securing or removing rugs.  · Have a working fire alarm and carbon monoxide detector; yes  · Engage in regular physical activity and social activities     Follow-up: Return in about 6 months (around 11/13/2021), or if symptoms worsen or fail to improve.

## 2021-05-24 ENCOUNTER — HOSPITAL ENCOUNTER (EMERGENCY)
Facility: MEDICAL CENTER | Age: 71
End: 2021-05-24
Attending: EMERGENCY MEDICINE
Payer: MEDICARE

## 2021-05-24 ENCOUNTER — APPOINTMENT (OUTPATIENT)
Dept: RADIOLOGY | Facility: MEDICAL CENTER | Age: 71
End: 2021-05-24
Attending: EMERGENCY MEDICINE
Payer: MEDICARE

## 2021-05-24 VITALS
HEIGHT: 62 IN | BODY MASS INDEX: 26.68 KG/M2 | WEIGHT: 145 LBS | OXYGEN SATURATION: 93 % | TEMPERATURE: 98.3 F | DIASTOLIC BLOOD PRESSURE: 69 MMHG | SYSTOLIC BLOOD PRESSURE: 155 MMHG | RESPIRATION RATE: 17 BRPM | HEART RATE: 71 BPM

## 2021-05-24 DIAGNOSIS — N39.0 URINARY TRACT INFECTION WITHOUT HEMATURIA, SITE UNSPECIFIED: ICD-10-CM

## 2021-05-24 DIAGNOSIS — R10.84 GENERALIZED ABDOMINAL PAIN: ICD-10-CM

## 2021-05-24 LAB
ALBUMIN SERPL BCP-MCNC: 4.5 G/DL (ref 3.2–4.9)
ALBUMIN/GLOB SERPL: 1.2 G/DL
ALP SERPL-CCNC: 79 U/L (ref 30–99)
ALT SERPL-CCNC: 21 U/L (ref 2–50)
ANION GAP SERPL CALC-SCNC: 11 MMOL/L (ref 7–16)
APPEARANCE UR: CLEAR
AST SERPL-CCNC: 24 U/L (ref 12–45)
BACTERIA #/AREA URNS HPF: NEGATIVE /HPF
BASOPHILS # BLD AUTO: 1.4 % (ref 0–1.8)
BASOPHILS # BLD: 0.08 K/UL (ref 0–0.12)
BILIRUB SERPL-MCNC: 0.6 MG/DL (ref 0.1–1.5)
BILIRUB UR QL STRIP.AUTO: NEGATIVE
BUN SERPL-MCNC: 7 MG/DL (ref 8–22)
CALCIUM SERPL-MCNC: 9.7 MG/DL (ref 8.5–10.5)
CHLORIDE SERPL-SCNC: 103 MMOL/L (ref 96–112)
CO2 SERPL-SCNC: 21 MMOL/L (ref 20–33)
COLOR UR: ABNORMAL
CREAT SERPL-MCNC: 0.77 MG/DL (ref 0.5–1.4)
EOSINOPHIL # BLD AUTO: 0.31 K/UL (ref 0–0.51)
EOSINOPHIL NFR BLD: 5.3 % (ref 0–6.9)
EPI CELLS #/AREA URNS HPF: NEGATIVE /HPF
ERYTHROCYTE [DISTWIDTH] IN BLOOD BY AUTOMATED COUNT: 44.2 FL (ref 35.9–50)
GLOBULIN SER CALC-MCNC: 3.9 G/DL (ref 1.9–3.5)
GLUCOSE SERPL-MCNC: 105 MG/DL (ref 65–99)
GLUCOSE UR STRIP.AUTO-MCNC: NEGATIVE MG/DL
HCT VFR BLD AUTO: 48.8 % (ref 37–47)
HGB BLD-MCNC: 16.7 G/DL (ref 12–16)
HYALINE CASTS #/AREA URNS LPF: NORMAL /LPF
IMM GRANULOCYTES # BLD AUTO: 0.02 K/UL (ref 0–0.11)
IMM GRANULOCYTES NFR BLD AUTO: 0.3 % (ref 0–0.9)
KETONES UR STRIP.AUTO-MCNC: NEGATIVE MG/DL
LEUKOCYTE ESTERASE UR QL STRIP.AUTO: NEGATIVE
LIPASE SERPL-CCNC: 36 U/L (ref 11–82)
LYMPHOCYTES # BLD AUTO: 1.41 K/UL (ref 1–4.8)
LYMPHOCYTES NFR BLD: 24.3 % (ref 22–41)
MCH RBC QN AUTO: 31 PG (ref 27–33)
MCHC RBC AUTO-ENTMCNC: 34.2 G/DL (ref 33.6–35)
MCV RBC AUTO: 90.7 FL (ref 81.4–97.8)
MICRO URNS: ABNORMAL
MONOCYTES # BLD AUTO: 0.47 K/UL (ref 0–0.85)
MONOCYTES NFR BLD AUTO: 8.1 % (ref 0–13.4)
NEUTROPHILS # BLD AUTO: 3.52 K/UL (ref 2–7.15)
NEUTROPHILS NFR BLD: 60.6 % (ref 44–72)
NITRITE UR QL STRIP.AUTO: POSITIVE
NRBC # BLD AUTO: 0 K/UL
NRBC BLD-RTO: 0 /100 WBC
PH UR STRIP.AUTO: 6.5 [PH] (ref 5–8)
PLATELET # BLD AUTO: 205 K/UL (ref 164–446)
PMV BLD AUTO: 10.9 FL (ref 9–12.9)
POTASSIUM SERPL-SCNC: 3.9 MMOL/L (ref 3.6–5.5)
PROT SERPL-MCNC: 8.4 G/DL (ref 6–8.2)
PROT UR QL STRIP: NEGATIVE MG/DL
RBC # BLD AUTO: 5.38 M/UL (ref 4.2–5.4)
RBC # URNS HPF: NORMAL /HPF
RBC UR QL AUTO: NEGATIVE
SODIUM SERPL-SCNC: 135 MMOL/L (ref 135–145)
SP GR UR STRIP.AUTO: 1
UROBILINOGEN UR STRIP.AUTO-MCNC: 0.2 MG/DL
WBC # BLD AUTO: 5.8 K/UL (ref 4.8–10.8)
WBC #/AREA URNS HPF: NORMAL /HPF

## 2021-05-24 PROCEDURE — 700117 HCHG RX CONTRAST REV CODE 255: Performed by: EMERGENCY MEDICINE

## 2021-05-24 PROCEDURE — 36415 COLL VENOUS BLD VENIPUNCTURE: CPT

## 2021-05-24 PROCEDURE — A9270 NON-COVERED ITEM OR SERVICE: HCPCS | Performed by: EMERGENCY MEDICINE

## 2021-05-24 PROCEDURE — 96374 THER/PROPH/DIAG INJ IV PUSH: CPT | Mod: XU

## 2021-05-24 PROCEDURE — 74177 CT ABD & PELVIS W/CONTRAST: CPT | Mod: MG

## 2021-05-24 PROCEDURE — 700102 HCHG RX REV CODE 250 W/ 637 OVERRIDE(OP): Performed by: EMERGENCY MEDICINE

## 2021-05-24 PROCEDURE — 83690 ASSAY OF LIPASE: CPT

## 2021-05-24 PROCEDURE — 85025 COMPLETE CBC W/AUTO DIFF WBC: CPT

## 2021-05-24 PROCEDURE — 81001 URINALYSIS AUTO W/SCOPE: CPT

## 2021-05-24 PROCEDURE — 80053 COMPREHEN METABOLIC PANEL: CPT

## 2021-05-24 PROCEDURE — 99285 EMERGENCY DEPT VISIT HI MDM: CPT

## 2021-05-24 PROCEDURE — 96375 TX/PRO/DX INJ NEW DRUG ADDON: CPT

## 2021-05-24 PROCEDURE — 700111 HCHG RX REV CODE 636 W/ 250 OVERRIDE (IP): Performed by: EMERGENCY MEDICINE

## 2021-05-24 RX ORDER — ONDANSETRON 2 MG/ML
4 INJECTION INTRAMUSCULAR; INTRAVENOUS ONCE
Status: COMPLETED | OUTPATIENT
Start: 2021-05-24 | End: 2021-05-24

## 2021-05-24 RX ORDER — CEFDINIR 300 MG/1
300 CAPSULE ORAL 2 TIMES DAILY
Qty: 14 CAPSULE | Refills: 0 | Status: SHIPPED | OUTPATIENT
Start: 2021-05-24 | End: 2021-05-28

## 2021-05-24 RX ORDER — CEFDINIR 300 MG/1
300 CAPSULE ORAL ONCE
Status: COMPLETED | OUTPATIENT
Start: 2021-05-24 | End: 2021-05-24

## 2021-05-24 RX ADMIN — IOHEXOL 100 ML: 350 INJECTION, SOLUTION INTRAVENOUS at 09:54

## 2021-05-24 RX ADMIN — ONDANSETRON 4 MG: 2 INJECTION INTRAMUSCULAR; INTRAVENOUS at 08:26

## 2021-05-24 RX ADMIN — CEFDINIR 300 MG: 300 CAPSULE ORAL at 11:40

## 2021-05-24 RX ADMIN — FENTANYL CITRATE 50 MCG: 50 INJECTION, SOLUTION INTRAMUSCULAR; INTRAVENOUS at 08:27

## 2021-05-24 ASSESSMENT — FIBROSIS 4 INDEX: FIB4 SCORE: 2.14

## 2021-05-24 NOTE — ED TRIAGE NOTES
"Karen Dimas  70 y.o.  Chief Complaint   Patient presents with   • Abdominal Pain     BIBA from home for generalized abd pain with \"coffee ground, stringy\" stools x1 week. denies BRBPR. no nausea/vomiting/dizziness/iron supplements/fever/chills. recent dx of cirrhosis d/t \"medication use\"      Patient changed to hospital gown  Placed on cardiac monitor.   V/s stable. Afebrile  Pending MD gabriel.     "

## 2021-05-24 NOTE — ED NOTES
Medicated Pt per MAR  Discharge instructions given to patient, prescriptions provided, a verbal understanding of all instructions was stated. IV removed, cathlon intact, site without s/s of infection. Pt preferred to walk out accompanied by self VSS,  all belongings accounted for.

## 2021-05-24 NOTE — ED PROVIDER NOTES
"ED Provider Note    CHIEF COMPLAINT  Chief Complaint   Patient presents with   • Abdominal Pain     BIBA from home for generalized abd pain with \"coffee ground, stringy\" stools x1 week. denies BRBPR. no nausea/vomiting/dizziness/iron supplements/fever/chills. recent dx of cirrhosis d/t \"medication use\"        HPI  Karen Dimas is a 70 y.o. female who presents to the emergency department by ambulance from home for abdominal pain and diarrhea.  Patient describes some generalized abdominal pain, sharp, burning with frequent diarrhea for 1 week.  However she noticed some \"black stringy things, like coffee grounds\" in her stool this morning.  No bright red blood.  Nausea without any vomiting.  No fever chills.  History of cirrhosis due to \"overmedication\" but denies alcohol use or dependence.  History of \"road rash from my esophagus all the way to the bottom\" after EGD recently.  Taking her \"stomach pills.\"  ([facility])    REVIEW OF SYSTEMS  See HPI for further details. All other systems are negative.     PAST MEDICAL HISTORY   has a past medical history of Cataract, Chronic back pain, Cirrhosis (HCC), Dental disorder, Heart burn, Hemorrhagic disorder (HCC), Hiatus hernia syndrome, High cholesterol, Hypertension, Indigestion, Myocardial infarct (HCC) (1995), Nevus of finger of right hand (1/20/2021), Psychiatric problem, and Stress-induced cardiomyopathy (9/30/2014).    SOCIAL HISTORY  Social History     Tobacco Use   • Smoking status: Current Some Day Smoker     Packs/day: 0.50     Years: 20.00     Pack years: 10.00     Types: Cigarettes     Last attempt to quit: 10/24/2017     Years since quitting: 3.5   • Smokeless tobacco: Never Used   • Tobacco comment: discussed, intermittent, knows she should quit   Vaping Use   • Vaping Use: Never used   Substance and Sexual Activity   • Alcohol use: Not Currently     Comment: no more at all, rare in the past   • Drug use: Yes     Types: Marijuana     Comment: " "marijuana used to relax   • Sexual activity: Not Currently       SURGICAL HISTORY   has a past surgical history that includes gastroscopy with biopsy (9/28/2014); colonoscopy with biopsy (9/28/2014); gyn surgery; cholecystectomy; and cervical disk and fusion anterior (11/27/2017).    CURRENT MEDICATIONS  Home Medications    **Home medications have not yet been reviewed for this encounter**         ALLERGIES  Allergies   Allergen Reactions   • Codeine Vomiting     [id number]       PHYSICAL EXAM  VITAL SIGNS: /69   Pulse (!) 102   Temp 36.8 °C (98.3 °F) (Temporal)   Resp (!) 26   Ht 1.575 m (5' 2\")   Wt 65.8 kg (145 lb)   SpO2 (!) 86%   BMI 26.52 kg/m²   Pulse ox interpretation: I interpret this pulse ox as normal.  Constitutional: Alert in no apparent distress.  HENT: Normocephalic, atraumatic. Bilateral external ears normal, Nose normal. Moist mucous membranes.    Eyes: Pupils are equal and reactive, Conjunctiva normal.   Neck: Normal range of motion, Supple  Lymphatic: No lymphadenopathy noted.   Cardiovascular: Regular rate and rhythm, no murmurs. Distal pulses intact.    Thorax & Lungs: Normal breath sounds.  No wheezing/rales/ronchi. No increased work of breathing  Abdomen: Soft, non-distended.  No reproducible discomfort with palpation.  No palpable pulsatile mass.  Rectal: Nonthrombosed external hemorrhoid.  Normal GISELLA, no palpable mass.  Minimal stool in vault.  Guaiac negative.  Skin: Warm, Dry, No erythema, No rash.   Musculoskeletal: Good range of motion in all major joints.   Neurologic: Alert and orient x4.  Moves her extremity spontaneously.  Psychiatric: Affect normal, Judgment normal, Mood normal.       DIAGNOSTIC STUDIES / PROCEDURES    LABS  Results for orders placed or performed during the hospital encounter of 05/24/21   CBC WITH DIFFERENTIAL   Result Value Ref Range    WBC 5.8 4.8 - 10.8 K/uL    RBC 5.38 4.20 - 5.40 M/uL    Hemoglobin 16.7 (H) 12.0 - 16.0 g/dL    Hematocrit 48.8 (H) " 37.0 - 47.0 %    MCV 90.7 81.4 - 97.8 fL    MCH 31.0 27.0 - 33.0 pg    MCHC 34.2 33.6 - 35.0 g/dL    RDW 44.2 35.9 - 50.0 fL    Platelet Count 205 164 - 446 K/uL    MPV 10.9 9.0 - 12.9 fL    Neutrophils-Polys 60.60 44.00 - 72.00 %    Lymphocytes 24.30 22.00 - 41.00 %    Monocytes 8.10 0.00 - 13.40 %    Eosinophils 5.30 0.00 - 6.90 %    Basophils 1.40 0.00 - 1.80 %    Immature Granulocytes 0.30 0.00 - 0.90 %    Nucleated RBC 0.00 /100 WBC    Neutrophils (Absolute) 3.52 2.00 - 7.15 K/uL    Lymphs (Absolute) 1.41 1.00 - 4.80 K/uL    Monos (Absolute) 0.47 0.00 - 0.85 K/uL    Eos (Absolute) 0.31 0.00 - 0.51 K/uL    Baso (Absolute) 0.08 0.00 - 0.12 K/uL    Immature Granulocytes (abs) 0.02 0.00 - 0.11 K/uL    NRBC (Absolute) 0.00 K/uL   COMP METABOLIC PANEL   Result Value Ref Range    Sodium 135 135 - 145 mmol/L    Potassium 3.9 3.6 - 5.5 mmol/L    Chloride 103 96 - 112 mmol/L    Co2 21 20 - 33 mmol/L    Anion Gap 11.0 7.0 - 16.0    Glucose 105 (H) 65 - 99 mg/dL    Bun 7 (L) 8 - 22 mg/dL    Creatinine 0.77 0.50 - 1.40 mg/dL    Calcium 9.7 8.5 - 10.5 mg/dL    AST(SGOT) 24 12 - 45 U/L    ALT(SGPT) 21 2 - 50 U/L    Alkaline Phosphatase 79 30 - 99 U/L    Total Bilirubin 0.6 0.1 - 1.5 mg/dL    Albumin 4.5 3.2 - 4.9 g/dL    Total Protein 8.4 (H) 6.0 - 8.2 g/dL    Globulin 3.9 (H) 1.9 - 3.5 g/dL    A-G Ratio 1.2 g/dL   LIPASE   Result Value Ref Range    Lipase 36 11 - 82 U/L   URINALYSIS (UA)    Specimen: Urine   Result Value Ref Range    Color DK Yellow     Character Clear     Specific Gravity 1.004 <1.035    Ph 6.5 5.0 - 8.0    Glucose Negative Negative mg/dL    Ketones Negative Negative mg/dL    Protein Negative Negative mg/dL    Bilirubin Negative Negative    Urobilinogen, Urine 0.2 Negative    Nitrite Positive (A) Negative    Leukocyte Esterase Negative Negative    Occult Blood Negative Negative    Micro Urine Req Microscopic    ESTIMATED GFR   Result Value Ref Range    GFR If African American >60 >60 mL/min/1.73 m 2    GFR  If Non African American >60 >60 mL/min/1.73 m 2   URINE MICROSCOPIC (W/UA)   Result Value Ref Range    WBC 0-2 /hpf    RBC 0-2 /hpf    Bacteria Negative None /hpf    Epithelial Cells Negative /hpf    Hyaline Cast 0-2 /lpf     RADIOLOGY  CT-ABDOMEN-PELVIS WITH   Final Result      1.  No acute inflammatory process in the abdomen or pelvis.   2.  Cirrhosis. No hepatic mass lesions detected. No ascites.   3.  Colonic diverticulosis. No diverticulitis.          COURSE & MEDICAL DECISION MAKING  Nursing notes and vital signs were reviewed. (See chart for details)  The patients records were reviewed, history was obtained from the patient;     ED evaluation for generalized abdominal pain and diarrhea is unrevealing, however patient does have evidence for urinary tract infection, this could be contributing to her symptoms.  Urine with nitrite.  No clinical evidence for pyelonephritis or sepsis.  Abdominal exam is really benign.  No leukocytosis, left shift or bandemia.  No electrolyte derangement.  CT is unremarkable.  Symptoms are controlled with single dose of fentanyl and Zofran.  Tolerating oral fluids before discharge.  First dose Omnicef in the emergency department.    Patient is stable for discharge at this time, anticipatory guidance provided, Omnicef for 7 days, close follow-up is encouraged, and strict ED return instructions have been detailed. Patient is agreeable to the disposition and plan.    Patient's blood pressure was elevated in the emergency department, and has been referred to primary care for close monitoring.    FINAL IMPRESSION  (R10.84) Generalized abdominal pain  (N39.0) Urinary tract infection without hematuria, site unspecified      Electronically signed by: Julisa Waters D.O., 5/24/2021 7:52 AM      This dictation was created using voice recognition software. The accuracy of the dictation is limited to the abilities of the software. I expect there may be some errors of grammar and possibly  content. The nursing notes were reviewed and certain aspects of this information were incorporated into this note.

## 2021-05-24 NOTE — DISCHARGE INSTRUCTIONS
Follow-up with primary care 2 to 3 days for reevaluation, medication management noted resolution of your urinary symptoms.    Omnicef twice daily for 7 days for urinary tract infection.  Tylenol or ibuprofen as needed for any discomfort.    Encourage oral fluid hydration.  Otherwise diet and activity as tolerated.    Return to the emergency department for persistent or worsening abdominal pain, flank pain, vomiting, diarrhea, fever or other new concerns.

## 2021-05-25 ENCOUNTER — PATIENT OUTREACH (OUTPATIENT)
Dept: HEALTH INFORMATION MANAGEMENT | Facility: OTHER | Age: 71
End: 2021-05-25

## 2021-05-25 NOTE — PROGRESS NOTES
Outcome: Scheduled ED [facility] 6/14/21 with Dr De Jesus.    Please transfer to Patient Outreach Team at [phone] when patient returns call.      Attempt # 1

## 2021-05-28 ENCOUNTER — TELEMEDICINE (OUTPATIENT)
Dept: TELEHEALTH | Facility: TELEMEDICINE | Age: 71
End: 2021-05-28
Payer: MEDICARE

## 2021-05-28 ENCOUNTER — NURSE TRIAGE (OUTPATIENT)
Dept: HEALTH INFORMATION MANAGEMENT | Facility: OTHER | Age: 71
End: 2021-05-28

## 2021-05-28 DIAGNOSIS — N30.00 ACUTE CYSTITIS WITHOUT HEMATURIA: ICD-10-CM

## 2021-05-28 DIAGNOSIS — T50.905A ADVERSE EFFECT OF DRUG, INITIAL ENCOUNTER: ICD-10-CM

## 2021-05-28 PROCEDURE — 99214 OFFICE O/P EST MOD 30 MIN: CPT | Mod: 95,CR | Performed by: PHYSICIAN ASSISTANT

## 2021-05-28 RX ORDER — SULFAMETHOXAZOLE AND TRIMETHOPRIM 800; 160 MG/1; MG/1
1 TABLET ORAL 2 TIMES DAILY
Qty: 6 TABLET | Refills: 0 | Status: SHIPPED | OUTPATIENT
Start: 2021-05-28 | End: 2021-06-02 | Stop reason: SDUPTHER

## 2021-05-28 ASSESSMENT — ENCOUNTER SYMPTOMS
CHILLS: 0
MUSCULOSKELETAL NEGATIVE: 1
NAUSEA: 0
DIZZINESS: 0
DIARRHEA: 0
ABDOMINAL PAIN: 0
SHORTNESS OF BREATH: 0
FEVER: 0
VOMITING: 0

## 2021-05-28 NOTE — PROGRESS NOTES
Subjective:      Karen Dimas is a 70 y.o. female who presents with Rash.         This evaluation was conducted via Zoom using secure and encrypted videoconferencing technology. The patient was in a private location in the [facility].    The patient's identity was confirmed and verbal consent was obtained for this virtual visit.      HPI  Patient presents to urgent care via telemedicine visit reporting a red and itchy rash present on her chest, back, and legs starting 2 days ago. She was seen in the the ED 3 days ago and diagnosed with a UTI, and started on a course of Cefdinir. She was experiencing urinary urgency and frequency at that time that has since resolved since starting the antibiotics. She denies facial swelling, throat tightness, or SOB.       Review of Systems   Constitutional: Negative for chills and fever.   HENT: Negative for congestion.    Respiratory: Negative for shortness of breath.    Cardiovascular: Negative for chest pain.   Gastrointestinal: Negative for abdominal pain, diarrhea, nausea and vomiting.   Genitourinary: Negative.    Musculoskeletal: Negative.    Skin: Positive for itching and rash.   Neurological: Negative for dizziness.        Objective:     There were no vitals taken for this visit.     Physical Exam  Vitals and nursing note reviewed.   Constitutional:       General: She is not in acute distress.     Appearance: Normal appearance. She is well-developed. She is not diaphoretic.   HENT:      Head: Normocephalic.      Right Ear: Hearing and external ear normal.      Left Ear: Hearing and external ear normal.      Mouth/Throat:      Pharynx: No oropharyngeal exudate or posterior oropharyngeal erythema.   Eyes:      General:         Right eye: No discharge.         Left eye: No discharge.      Pupils: Pupils are equal, round, and reactive to light.   Cardiovascular:      Rate and Rhythm: Normal rate.      Heart sounds: No murmur heard.     Pulmonary:      Effort:  Pulmonary effort is normal.   Musculoskeletal:         General: Normal range of motion.      Cervical back: Normal range of motion.   Lymphadenopathy:      Cervical: No cervical adenopathy.   Skin:     Comments: Difficult to visualized erythematous blotchy rash present on chest and back    Neurological:      Mental Status: She is alert.            PMH:  has a past medical history of Cataract, Chronic back pain, Cirrhosis (HCC), Dental disorder, Heart burn, Hemorrhagic disorder ([facility], [facility] within [facility]), Hiatus hernia syndrome, High cholesterol, Hypertension, Indigestion, Myocardial infarct (HCC) (1995), Nevus of finger of right hand (1/20/2021), Psychiatric problem, and Stress-induced cardiomyopathy (9/30/2014).  MEDS:   Current Outpatient Medications:   •  sulfamethoxazole-trimethoprim (BACTRIM DS) 800-160 MG tablet, Take 1 tablet by mouth 2 times a day for 3 days., Disp: 6 tablet, Rfl: 0  •  tizanidine (ZANAFLEX) 2 MG tablet, Take 2 Tablets by mouth 2 times a day., Disp: 120 tablet, Rfl: 11  •  pantoprazole (PROTONIX) 40 MG Tablet Delayed Response, Take 1 tablet by mouth every day., Disp: 90 tablet, Rfl: 3  •  enalapril (VASOTEC) 20 MG tablet, Take 1 tablet by mouth 2 times a day. Indications: High Blood Pressure Disorder, Disp: 200 tablet, Rfl: 3  •  Cholecalciferol (VITAMIN D3) 2000 UNIT Cap, Take  by mouth., Disp: , Rfl:   ALLERGIES:   Allergies   Allergen Reactions   • Codeine Vomiting     [id number]   • Cefdinir      Rash     SURGHX:   Past Surgical History:   Procedure Laterality Date   • CERVICAL DISK AND FUSION ANTERIOR  11/27/2017    Procedure: CERVICAL DISK AND FUSION ANTERIOR C4-7 WITH PLATE;  Surgeon: Prem Back M.D.;  Location: SURGERY [facility];  Service: Neurosurgery   • GASTROSCOPY WITH BIOPSY  9/28/2014    Performed by Carlos Ivan M.D. at ENDOSCOPY [facility]   • COLONOSCOPY WITH BIOPSY  9/28/2014    Performed by Carlos Ivan M.D. at ENDOSCOPY [facility]   • CHOLECYSTECTOMY     •  GYN SURGERY      Hysterectomy     SOCHX:  reports that she has been smoking cigarettes. She has a 10.00 pack-year smoking history. She has never used smokeless tobacco. She reports previous alcohol use. She reports current drug use. Drug: Marijuana.  FH: family history is not on file.       Assessment/Plan:        1. Adverse effect of drug, initial encounter        2. Acute cystitis without hematuria    - sulfamethoxazole-trimethoprim (BACTRIM DS) 800-160 MG tablet; Take 1 tablet by mouth 2 times a day for 3 days.  Dispense: 6 tablet; Refill: 0   - Complete full course of antibiotics as prescribed       Advised patient to stop taking current course of Cefdinir. She will complete an additional 3 days of Bactrim for UTI. She states all UTI symptoms have resolved. Encouraged use of OTC benadryl as needed for itching. Encouraged to seek medical attention in person if symptoms persist/worsen. The patient demonstrated a good understanding and agreed with the treatment plan.

## 2021-05-28 NOTE — TELEPHONE ENCOUNTER
"Pt started antibiotic on Monday. Yesterday pt broke out in hives that has gotten much worse today. No other new diet/medications. Advised UC per protocol.     Reason for Disposition  • MODERATE-SEVERE hives persist (i.e., hives interfere with normal activities or work) and taking antihistamine (e.g., Benadryl, Claritin) > 24 hours    Additional Information  • Negative: Difficulty breathing or wheezing now  • Negative: Rapid onset of swollen tongue  • Negative: Rapid onset of hoarseness or cough  • Negative: Very weak (can't stand)  • Negative: Difficult to awaken or acting confused (e.g., disoriented, slurred speech)  • Negative: Life-threatening reaction (anaphylaxis) in the past to similar substance (e.g., food, insect bite/sting, chemical, etc.) and < 2 hours since exposure  • Negative: Sounds like a life-threatening emergency to the triager  • Negative: Bee, wasp, or yellow jacket sting within last 24 hours  • Negative: Taking a new medicine now or within last 3 days (EXCEPTION: antihistamine, decongestant or other OTC cough/cold medicines)  • Negative: Doesn't match the SYMPTOMS of hives  • Negative: Swollen tongue  • Negative: Widespread hives and onset < 2 hours of exposure to high-risk allergen (e.g., peanuts, tree nuts, fish, or shellfish)  • Negative: Patient sounds very sick or weak to the triager    Answer Assessment - Initial Assessment Questions  1. APPEARANCE: \"What does the rash look like?\"       red  2. LOCATION: \"Where is the rash located?\"       Head to abdomen  3. NUMBER: \"How many hives are there?\"       No welts  4. SIZE: \"How big are the hives?\" (inches, cm, compare to coins) \"Do they all look the same or is there lots of variation in shape and size?\"       No welts, red and itchy  5. ONSET: \"When did the hives begin?\" (Hours or days ago)       yesterday  6. ITCHING: \"Does it itch?\" If so, ask: \"How bad is the itch?\"     - MILD: doesn't interfere with normal activities    - MODERATE-SEVERE: " "interferes with work, school, sleep, or other activities       Moderate to severe  7. RECURRENT PROBLEM: \"Have you had hives before?\" If so, ask: \"When was the last time?\" and \"What happened that time?\"       no  8. TRIGGERS: \"Were you exposed to any new food, plant, cosmetic product or animal just before the hives began?\"      New antibiotic  9. OTHER SYMPTOMS: \"Do you have any other symptoms?\" (e.g., fever, tongue swelling, difficulty breathing, abdominal pain)      Difficulty breathing with exertion  10. PREGNANCY: \"Is there any chance you are pregnant?\" \"When was your last menstrual period?\"        no    Protocols used: HIVES-A-[place]    "

## 2021-05-28 NOTE — TELEPHONE ENCOUNTER
----- Message from Jessica Azar sent at 5/28/2021  9:38 AM PDT -----  HIVES SINCE YESTERDAY AFTER STARTING ANTIBIOTIC MONDAY  PT SAYS SOME HARD TIME BREATHING

## 2021-06-02 ENCOUNTER — TELEPHONE (OUTPATIENT)
Dept: MEDICAL GROUP | Facility: MEDICAL CENTER | Age: 71
End: 2021-06-02

## 2021-06-02 ENCOUNTER — DOCUMENTATION (OUTPATIENT)
Dept: MEDICAL GROUP | Facility: MEDICAL CENTER | Age: 71
End: 2021-06-02

## 2021-06-02 ENCOUNTER — HOSPITAL ENCOUNTER (OUTPATIENT)
Dept: LAB | Facility: MEDICAL CENTER | Age: 71
End: 2021-06-02
Attending: FAMILY MEDICINE
Payer: MEDICARE

## 2021-06-02 DIAGNOSIS — R93.2 ABNORMAL ULTRASOUND OF LIVER: ICD-10-CM

## 2021-06-02 DIAGNOSIS — R74.8 ELEVATED LIVER ENZYMES: ICD-10-CM

## 2021-06-02 DIAGNOSIS — R77.1 HYPERGLOBULINEMIA: ICD-10-CM

## 2021-06-02 DIAGNOSIS — N30.00 ACUTE CYSTITIS WITHOUT HEMATURIA: ICD-10-CM

## 2021-06-02 DIAGNOSIS — R74.01 TRANSAMINITIS: ICD-10-CM

## 2021-06-02 LAB
FERRITIN SERPL-MCNC: 114 NG/ML (ref 10–291)
HBV SURFACE AG SER QL: NORMAL
IRON SATN MFR SERPL: 30 % (ref 15–55)
IRON SERPL-MCNC: 84 UG/DL (ref 40–170)
TIBC SERPL-MCNC: 279 UG/DL (ref 250–450)
UIBC SERPL-MCNC: 195 UG/DL (ref 110–370)

## 2021-06-02 PROCEDURE — 36415 COLL VENOUS BLD VENIPUNCTURE: CPT

## 2021-06-02 PROCEDURE — 86039 ANTINUCLEAR ANTIBODIES (ANA): CPT

## 2021-06-02 PROCEDURE — 86334 IMMUNOFIX E-PHORESIS SERUM: CPT

## 2021-06-02 PROCEDURE — 82784 ASSAY IGA/IGD/IGG/IGM EACH: CPT

## 2021-06-02 PROCEDURE — 84155 ASSAY OF PROTEIN SERUM: CPT

## 2021-06-02 PROCEDURE — 87340 HEPATITIS B SURFACE AG IA: CPT

## 2021-06-02 PROCEDURE — 82105 ALPHA-FETOPROTEIN SERUM: CPT

## 2021-06-02 PROCEDURE — 86256 FLUORESCENT ANTIBODY TITER: CPT

## 2021-06-02 PROCEDURE — 82728 ASSAY OF FERRITIN: CPT

## 2021-06-02 PROCEDURE — 86038 ANTINUCLEAR ANTIBODIES: CPT

## 2021-06-02 PROCEDURE — 86235 NUCLEAR ANTIGEN ANTIBODY: CPT | Mod: 91

## 2021-06-02 PROCEDURE — 83550 IRON BINDING TEST: CPT

## 2021-06-02 PROCEDURE — 84165 PROTEIN E-PHORESIS SERUM: CPT

## 2021-06-02 PROCEDURE — 86225 DNA ANTIBODY NATIVE: CPT

## 2021-06-02 PROCEDURE — 82390 ASSAY OF CERULOPLASMIN: CPT

## 2021-06-02 PROCEDURE — 83540 ASSAY OF IRON: CPT

## 2021-06-02 RX ORDER — SULFAMETHOXAZOLE AND TRIMETHOPRIM 800; 160 MG/1; MG/1
1 TABLET ORAL 2 TIMES DAILY
Qty: 10 TABLET | Refills: 0 | Status: SHIPPED | OUTPATIENT
Start: 2021-06-02 | End: 2021-06-07

## 2021-06-03 ENCOUNTER — TELEPHONE (OUTPATIENT)
Dept: MEDICAL GROUP | Facility: MEDICAL CENTER | Age: 71
End: 2021-06-03

## 2021-06-03 NOTE — TELEPHONE ENCOUNTER
ESTABLISHED PATIENT PRE-VISIT PLANNING     Patient was NOT contacted to complete PVP.     Note: Patient will not be contacted if there is no indication to call.     1.  Reviewed notes from the last few office visits within the medical group: No    2.  If any orders were placed at last visit or intended to be done for this visit (i.e. 6 mos follow-up), do we have Results/Consult Notes?         •  Labs - Labs were not ordered at last office visit.  Note: If patient appointment is for lab review and patient did not complete labs, check with provider if OK to reschedule patient until labs completed.       •  Imaging - Imaging was not ordered at last office visit.       •  Referrals - No referrals were ordered at last office visit.    3. Is this appointment scheduled as a Hospital Follow-Up? Yes, visit was at [facility].     4.  Immunizations were updated in Epic using Reconcile Outside Information activity? No. Nothing to Reconcile.    5.  Patient is due for the following Health Maintenance Topics:   There are no preventive care reminders to display for this patient.    - Patient is up-to-date on all Health Maintenance topics. No records have been requested at this time.     6.  AHA (Pulse8) form printed for Provider? No, patient does not have any open alerts

## 2021-06-04 LAB
AFP-TM SERPL-MCNC: 2 NG/ML (ref 0–9)
CERULOPLASMIN SERPL-MCNC: 25 MG/DL (ref 17–54)
NUCLEAR IGG SER QL IA: DETECTED

## 2021-06-06 LAB
ANA INTERPRETIVE COMMENT Q5143: ABNORMAL
ANA PATTERN Q5144: ABNORMAL
ANA TITER Q5145: ABNORMAL
ANTINUCLEAR ANTIBODY (ANA), HEP-2, IGG Q5142: DETECTED
IGA SERPL-MCNC: 246 MG/DL (ref 68–408)
IGG SERPL-MCNC: 1579 MG/DL (ref 768–1632)
IGM SERPL-MCNC: 42 MG/DL (ref 35–263)

## 2021-06-07 LAB
ALBUMIN SERPL ELPH-MCNC: 3.94 G/DL (ref 3.75–5.01)
ALPHA1 GLOB SERPL ELPH-MCNC: 0.33 G/DL (ref 0.19–0.46)
ALPHA2 GLOB SERPL ELPH-MCNC: 0.86 G/DL (ref 0.48–1.05)
B-GLOBULIN SERPL ELPH-MCNC: 0.75 G/DL (ref 0.48–1.1)
DSDNA AB TITR SER CLIF: 356 IU (ref 0–24)
GAMMA GLOB SERPL ELPH-MCNC: 1.52 G/DL (ref 0.62–1.51)
INTERPRETATION SERPL IFE-IMP: ABNORMAL
MONOCLON BAND OBS SERPL: ABNORMAL
PATHOLOGY STUDY: ABNORMAL
PROT SERPL-MCNC: 7.4 G/DL (ref 6.3–8.2)

## 2021-06-08 LAB
DSDNA IGG TITR SER CLIF: ABNORMAL {TITER}
ENA JO1 AB TITR SER: 2 AU/ML (ref 0–40)
ENA SCL70 IGG SER QL: 1 AU/ML (ref 0–40)
ENA SM IGG SER-ACNC: 2 AU/ML (ref 0–40)
ENA SS-B IGG SER IA-ACNC: 1 AU/ML (ref 0–40)
SSA52 R0ENA AB IGG Q0420: 1 AU/ML (ref 0–40)
SSA60 R0ENA AB IGG Q0419: 1 AU/ML (ref 0–40)

## 2021-06-09 LAB — U1 SNRNP IGG SER QL: NORMAL

## 2021-06-14 ENCOUNTER — OFFICE VISIT (OUTPATIENT)
Dept: MEDICAL GROUP | Facility: MEDICAL CENTER | Age: 71
End: 2021-06-14
Payer: MEDICARE

## 2021-06-14 VITALS
SYSTOLIC BLOOD PRESSURE: 128 MMHG | TEMPERATURE: 98.5 F | RESPIRATION RATE: 18 BRPM | OXYGEN SATURATION: 95 % | HEART RATE: 87 BPM | DIASTOLIC BLOOD PRESSURE: 78 MMHG | WEIGHT: 142 LBS | BODY MASS INDEX: 26.13 KG/M2 | HEIGHT: 62 IN

## 2021-06-14 DIAGNOSIS — R76.0 ANTINUCLEAR ANTIBODY (ANA) TITER GREATER THAN 1:80: ICD-10-CM

## 2021-06-14 DIAGNOSIS — R61 HYPERHIDROSIS: ICD-10-CM

## 2021-06-14 DIAGNOSIS — R82.90 ABNORMAL FINDING ON URINALYSIS: ICD-10-CM

## 2021-06-14 DIAGNOSIS — M50.00 INTERVERTEBRAL CERVICAL DISC DISORDER WITH MYELOPATHY, CERVICAL REGION: ICD-10-CM

## 2021-06-14 DIAGNOSIS — F41.8 SITUATIONAL ANXIETY: ICD-10-CM

## 2021-06-14 DIAGNOSIS — K20.90 ESOPHAGITIS: ICD-10-CM

## 2021-06-14 DIAGNOSIS — Z87.440 HISTORY OF URINARY TRACT INFECTION: ICD-10-CM

## 2021-06-14 DIAGNOSIS — R76.8 ANA POSITIVE: ICD-10-CM

## 2021-06-14 DIAGNOSIS — K29.30 CHRONIC SUPERFICIAL GASTRITIS WITHOUT BLEEDING: ICD-10-CM

## 2021-06-14 DIAGNOSIS — M79.10 MYALGIA: ICD-10-CM

## 2021-06-14 PROCEDURE — 99214 OFFICE O/P EST MOD 30 MIN: CPT | Performed by: FAMILY MEDICINE

## 2021-06-14 RX ORDER — GABAPENTIN 300 MG/1
300 CAPSULE ORAL 3 TIMES DAILY
Qty: 270 CAPSULE | Refills: 2 | Status: SHIPPED | OUTPATIENT
Start: 2021-06-14 | End: 2022-03-01

## 2021-06-14 RX ORDER — ALPRAZOLAM 0.5 MG/1
0.5 TABLET ORAL 2 TIMES DAILY PRN
Qty: 60 TABLET | Refills: 0 | Status: SHIPPED | OUTPATIENT
Start: 2021-06-14 | End: 2021-11-30 | Stop reason: SDUPTHER

## 2021-06-14 RX ORDER — ESOMEPRAZOLE MAGNESIUM 40 MG/1
40 CAPSULE, DELAYED RELEASE ORAL
Qty: 90 CAPSULE | Refills: 3 | Status: SHIPPED | OUTPATIENT
Start: 2021-06-14 | End: 2021-11-30

## 2021-06-14 ASSESSMENT — FIBROSIS 4 INDEX: FIB4 SCORE: 1.74

## 2021-06-14 NOTE — PROGRESS NOTES
Subjective:     Karen Dimas is a 70 y.o. female who presents for Hospital Follow-up.      POST DISCHARGE CALL:  Discharge Date:*   Date of Outreach Call: *  Now that you're home, how are you doing? *  Do you have questions about your medications? *    Did you fill your medications? *    Do you have a follow-up appointment scheduled?*    Discharging Department: *    Number of Attempts: *  Current or previous attempts completed within two business days of discharge? *  Provided education regarding treatment plan, medication, self-management, ADLs? *  Has patient completed Advance Directive? If yes, advise them to bring to appointment. *  Care Manager phone number provided? *  Is there anything else I can help you with? *      HPI:   Recently hospitalized for urinary tract infection and muscle and joint pain.  She has a liver that appears cirrhotic.  However her JARRETT is very high and autoimmune disease is likely.  She no longer has dysuria.  No UTI symptoms currently.       Current medicines (including reconciliation performed today)  Current Outpatient Medications   Medication Sig Dispense Refill   • gabapentin (NEURONTIN) 300 MG Cap Take 1 capsule by mouth 3 times a day. 270 capsule 2   • aluminum chloride (DRYSOL) 20 % external solution Apply to axillary regions nightly 60 mL 4   • esomeprazole (NEXIUM) 40 MG delayed-release capsule Take 1 capsule by mouth every morning before breakfast. 90 capsule 3   • tizanidine (ZANAFLEX) 2 MG tablet Take 2 Tablets by mouth 2 times a day. 120 tablet 11   • enalapril (VASOTEC) 20 MG tablet Take 1 tablet by mouth 2 times a day. Indications: High Blood Pressure Disorder 200 tablet 3   • Cholecalciferol (VITAMIN D3) 2000 UNIT Cap Take  by mouth.       No current facility-administered medications for this visit.       Allergies:   Codeine and Cefdinir    Social History:  Social History     Socioeconomic History   • Marital status:      Spouse name: Not on file   •  Number of children: Not on file   • Years of education: Not on file   • Highest education level: Not on file   Occupational History   • Not on file   Tobacco Use   • Smoking status: Current Some Day Smoker     Packs/day: 0.50     Years: 20.00     Pack years: 10.00     Types: Cigarettes     Last attempt to quit: 10/24/2017     Years since quitting: 3.6   • Smokeless tobacco: Never Used   • Tobacco comment: discussed, intermittent, knows she should quit   Vaping Use   • Vaping Use: Never used   Substance and Sexual Activity   • Alcohol use: Not Currently     Comment: no more at all, rare in the past   • Drug use: Yes     Types: Marijuana     Comment: marijuana used to relax   • Sexual activity: Not Currently   Other Topics Concern   • Not on file   Social History Narrative   • Not on file     Social Determinants of Health     Financial Resource Strain:    • Difficulty of Paying Living Expenses:    Food Insecurity:    • Worried About Running Out of Food in the Last Year:    • Ran Out of Food in the Last Year:    Transportation Needs:    • Lack of Transportation (Medical):    • Lack of Transportation (Non-Medical):    Physical Activity:    • Days of Exercise per Week:    • Minutes of Exercise per Session:    Stress:    • Feeling of Stress :    Social Connections:    • Frequency of Communication with Friends and Family:    • Frequency of Social Gatherings with Friends and Family:    • Attends Taoist Services:    • Active Member of Clubs or Organizations:    • Attends Club or Organization Meetings:    • Marital Status:    Intimate Partner Violence:    • Fear of Current or Ex-Partner:    • Emotionally Abused:    • Physically Abused:    • Sexually Abused:          ROS:  No fever, chest pain, shortness of breath or abdominal pain.  Patient continues to have significant muscle and joint pain.  Also complains of significant fatigue.       Objective:     /78 (BP Location: Left arm, Patient Position: Sitting, BP Cuff  "Size: Adult)   Pulse 87   Temp 36.9 °C (98.5 °F) (Temporal)   Resp 18   Ht 1.575 m (5' 2\")   Wt 64.4 kg (142 lb)   SpO2 95%   Body mass index is 25.97 kg/m².    Physical Exam:  Vital signs reviewed patient, daughter, physician and staff all wearing masks.  HEENT:   EOMI, PERRLA.    Neck:       Full range of motion, moderate posterior cervical spasm. No JVD or carotid bruits appreciated. No cervical adenopathy appreciated. No thyromegaly or neck masses appreciated.  No retractions appreciated.  Lungs:     Clear to auscultation A&P with good air movement.   Heart:      Regular rate and rhythm normal S1 and S2 without murmur appreciated.  Strong and symmetric radial and DP pulses.  Abd:        Soft, bowel sounds positive, nontender. No bloating noted. No hepatosplenomegaly or mass appreciated. No pulsatile mass appreciated.  Ext:         Extremities show symmetric and full range of motion with normal strength. No cyanosis or clubbing appreciated. No peripheral edema appreciated.  Neuro:    Gait is normal. Patient is lucid, fluent and appropriate. No significant tremor appreciated.  Skin:       Exhibit no rashes, pigmented lesions or ulcerations.    Assessment and Plan:     1. History of urinary tract infection/Abnormal finding on urinalysis  Patient was seen at the hospital and was told she had a UTI.  She was placed on cefdinir but had a significant allergic reaction.  Patient states she had dysuria at that point.  Patient denies any further dysuria.  Culture order placed for patient to complete at the lab if symptoms return.  - URINE CULTURE(NEW); Future    2. Antinuclear antibody (JARRETT) titer greater than 1:80  Patient has a very high JARRETT of 1:2700.  This makes her liver disease look to be autoimmune most likely.  Patient is also having arthralgia and myalgia.  Complement levels are ordered.  However, I believe patient needs a consultation and treatment with rheumatology.  Referral is discussed and placed.  - " REFERRAL TO RHEUMATOLOGY  - COMPLEMENT C3+C4 SERUM; Future    4. Myalgia  Patient has significant myalgia but her CPKs have been normal.  However JARRETT is very high.  - REFERRAL TO RHEUMATOLOGY  - Sed Rate; Future  - COMPLEMENT C3+C4 SERUM; Future    5. Chronic superficial gastritis without bleeding  Patient had endoscopy recently which showed chronic superficial gastritis and esophagitis.  Neither the omeprazole or the pantoprazole are working much to reduce her symptoms.  I will switch her to as omeprazole but if that does not work we will need to progress to Carafate.  - esomeprazole (NEXIUM) 40 MG delayed-release capsule; Take 1 capsule by mouth every morning before breakfast.  Dispense: 90 capsule; Refill: 3    6. Esophagitis  Patient continues to have some dysphagia and esophagitis symptoms.  Medication is changed as discussed above.  - esomeprazole (NEXIUM) 40 MG delayed-release capsule; Take 1 capsule by mouth every morning before breakfast.  Dispense: 90 capsule; Refill: 3    7. Intervertebral cervical disc disorder with myelopathy, cervical region  Patient does have significant cervical disc disease.  She finds that off the gabapentin her pain is greatly increased.  She was reassured by her discussions with GI that she could resume the gabapentin.  The 100 mg was not enough.  We have increased to 300 mg she will let me know how that does for her.  - gabapentin (NEURONTIN) 300 MG Cap; Take 1 capsule by mouth 3 times a day.  Dispense: 270 capsule; Refill: 2    8. Hyperhidrosis  Patient is having significant hyperhidrosis.  She has had this off and on in her life.  Discussed the use of Drysol aluminum chloride solution.  - aluminum chloride (DRYSOL) 20 % external solution; Apply to axillary regions nightly  Dispense: 60 mL; Refill: 4    9. JARRETT positive  Her JARRETT is highly positive as discussed above, sed rate order is also placed.  - Sed Rate; Future    10. Situational anxiety  Patient does have situational  anxiety.  She has used alprazolam as needed in the past.  She would like a renewal of this.  PDMP review shows no inconsistencies.  Patient's use has been sparing.  Patient does not use alcohol.  The prescription is written and sent.  - ALPRAZolam (XANAX) 0.5 MG Tab; Take 1 tablet by mouth 2 times a day as needed for Sleep or Anxiety for up to 30 days.  Dispense: 60 tablet; Refill: 0      - Chart and discharge summary were reviewed.   - Hospitalization and results reviewed with patient.   - Medications reviewed including instructions regarding high risk medications, dosing and side effects.  - Recommended Services: No services needed at this time she will continue to work closely with gastroenterology.  - Advance directive/POLST on file?  No  Some literature was given today.    Follow-up:Return in about 3 months (around 9/14/2021), or if symptoms worsen or fail to improve.    Face-to-face transitional care management services with MODERATE (today's visit is at least 14 days post discharge & LACE+ score of 28-58) medical decision complexity were provided.     LACE+ Historical Score Over Time (0-28: Low, 29-58: Medium, 59+: High): 25    Coding guide:   [id number]        - face-to-face within 14 day        - moderate decision complexity (LACE+ score of 28-58)  12115       - face-to-face within 7 days       - high medical decision complexity (LACE+ score 59+)

## 2021-06-14 NOTE — LETTER
June 14, 2021     Please fax to Dr. Modesto Dimas  [address] [facility] [address]        Dear Karen:      Below are the results from your recent visit:    Resulted Orders   AFP SERUM TUMOR MARKER   Result Value Ref Range    Alpha Fetoprotein 2 0 - 9 ng/mL      Comment:      INTERPRETIVE INFORMATION: Alpha Fetoprotein Tumor Marker  The Tonia Sim Access DxI AFP method is used. Results  obtained with different assay methods or kits cannot be used  interchangeably. AFP is a valuable aid in the management of  nonseminomatous testicular cancer patients when used in  conjunction with information available from the clinical  evaluation and other diagnostic procedures. Increased AFP  concentrations have also been observed in ataxia telangiectasia,  hereditary tyrosinemia, primary hepatocellular carcinoma,  teratocarcinoma, gastrointestinal tract cancers with and without  liver metastases, and in benign hepatic conditions such as acute  viral hepatitis, chronic active hepatitis, and cirrhosis. The  result cannot be interpreted as absolute evidence of the presence  or absence of malignant disease. The result is not interpretable  as a tumor marker in pregnant females.  Access complete set of age- and/or gender-specific reference  intervals for this test in the  [facility] Test Directory  ([email or web address].com).  Performed By: [facility]  [address] [place] [id number]  : Ev Rose MD     JARRETT REFLEXIVE PROFILE   Result Value Ref Range    Antinuclear Antibody Detected (A) None Detected      Comment:      Antibodies to Anti-Nuclear Antibodies (JARRETT) detected. Additional  testing to follow.  INTERPRETIVE INFORMATION: Anti-Nuclear Antibodies (JARRETT), IgG by  KAYLEN  Antinuclear Antibodies (JARRETT), IgG by KAYLEN: JARRETT specimens are  screened using enzyme-linked immunosorbent assay (KAYLEN)  methodology. All KAYLEN results reported as Detected are further  tested by indirect  fluorescent assay (IFA) using HEp-2 substrate  with an IgG-specific conjugate. The JARRETT KAYLEN screen is designed  to detect antibodies against dsDNA, histones, SS-A (Ro), SS-B  (La), Sanchez, Sanchez/RNP, Scl-70, Joslyn-1, centromeric proteins, other  antigens extracted from the HEp-2 cell nucleus. JARRETT KAYLEN assays  have been reported to have lower sensitivities than JARRETT IFA for  systemic autoimmune rheumatic diseases (SARD).  Negative results do not necessarily rule out SARD.  Performed By: [facility] [place] [id number]  : Ev Rose MD     IRON/TOTAL IRON BIND   Result Value Ref Range    Iron 84 40 - 170 ug/dL    Total Iron Binding 279 250 - 450 ug/dL    Unsat Iron Binding 195 110 - 370 ug/dL    % Saturation 30 15 - 55 %   FERRITIN   Result Value Ref Range    Ferritin 114.0 10.0 - 291.0 ng/mL   CERULOPLASMIN   Result Value Ref Range    Ceruloplasmin 25 17 - 54 mg/dL      Comment:      REFERENCE INTERVAL: Ceruloplasmin  Access complete set of age- and/or gender-specific reference  intervals for this test in the [facility] Test Directory  ([email or web address]).  Performed By: [facility] [place] [address]  : Ev Rose MD     SPEP W/REFLEX TO MANDA, A, G, M   Result Value Ref Range    Albumin 3.94 3.75 - 5.01 g/dL    Alpha-1 Globulin 0.33 0.19 - 0.46 g/dL    Alpha-2 Globulin 0.86 0.48 - 1.05 g/dL    Beta Globulin 0.75 0.48 - 1.10 g/dL    Gamma Globulin 1.52 (H) 0.62 - 1.51 g/dL    Interpretation See Note       Comment:      Slight increase in the gamma region.  MANDA shows a normal  pattern; no monoclonal proteins seen.      MANDA Reflex MANDA Done     Total Protein, Serum 7.4 6.3 - 8.2 g/dL    EER Serum Prot. Electro. Reflex See Note       Comment:      Access Onsite Care Enhanced Report using the link below:    -Direct access: [email or web address]  Performed By: [facility] [place] [address]  : Ev Rose MD     HEP B SURFACE ANTIGEN   Result Value Ref Range    Hepatitis B Surface Antigen Non-Reactive Non-Reactive      Comment:      It has been reported that certain assays will not detect all HBV mutants.  If acute or chronic HBV infection is suspected and the HBsAg result is  negative, it is recommended that other serological markers be tested to  confirm the HBsAg nonreactivity.  HBsAg test results should always be  assessed in conjunction with the patient's medical history, clinical  examination, and other findings.    Note: Assay performance characteristics have not been established when the  [employer]ys HBsAg II assay is used in conjunction with other manufacturers'  assays for specific HBV serological markers. Assay performance  characteristics have not been established for testing of newborns.       JARRETT HEp-2 Substrate, IgG IFA   Result Value Ref Range    Antinuclear Antibody (JARRETT), HEp-2, IgG Detected (H) <1:80    JARRETT Interpretive Comment See Note       Comment:      Homogeneous Pattern  Clinical associations: SLE, drug-induced SLE or GARRET.  Main autoantibodies: Anti-dsDNA, anti-histones or anti-chromatin  (anti-nucleosome)  List of Abbreviations  Antisynthetase syndrome (ARS), chronic active hepatitis (CAH),  inflammatory  myopathies (IM) [dermatomyositis (DM), polymyositis  (PM), necrotizing autoimmune myopathy (NAM)], interstitial lung  disease (ILD), juvenile idiopathic arthritis (GARRET), mixed  connective tissue disease (MCTD), primary biliary cholangitis  (PBC), rheumatoid arthritis (RA), systemic autoimmune rheumatic  diseases (SARD), Sjogren syndrome (SjS), systemic lupus  erythematosus (SLE), systemic sclerosis (SSc), undifferentiated  connective tissue disease (UCTD).  INTERPRETIVE INFORMATION: JARRETT Interpretive Comment  Presence of antinuclear antibodies (JARRETT) is a hallmark feature of  systemic autoimmune rheumatic diseases (SARD). However, JARRETT  lacks  diagnostic specificity and is associated with a variety of  diseases (cancers, autoimmune,  infectious, and inflammatory  conditions)  and may also occur in healthy individuals in varying  prevalence. The lack of diagnostic specificity requires  confirmation of positive JARRETT by more specific serologic tests. JARRETT  (nuclear reactivity) positive patterns reported include  centromere, homogeneous, nuclear dots, nucleolar, or speckled. JARRETT  (cytoplasmic reactivity) positive patterns reported include  reticular/AMA, discrete/GW body-like, polar/golgi-like,  cytoplasmic speckled or rods and rings. All positive patterns are  reported to endpoint titers (1:2560). Reported patterns may help  guide differential diagnosis, although they may not be specific  for individual antibodies or diseases. Mitotic staining patterns  not reported.  Negative results do not necessarily rule out SARD.  Performed By: [facility]  [address] [place] [address]  : Ev Rose MD     Antinuclear Ab, Single Pattern   Result Value Ref Range    JARRETT Pattern Homogeneous (A)     JARRETT Titer >1:2560 (H)       Comment:      Extractable Nuclear Antigen Antibodies (RNP, Sanchez, SSA 52, SSA  60, Scleroderma, Joslyn-1, and SSB), and Double Stranded DNA (dsDNA)  Antibody IgG to follow.  Performed By: [facility]  [address] [place] [address]  : Ev Rose MD     IGG SERUM QUANT   Result Value Ref Range    Immunoglobulin G 1579.0 768.0 - 1632.0 mg/dL      Comment:      REFERENCE INTERVAL: Immunoglobulin G  Access complete set of age- and/or gender-specific reference  intervals for this test in the [facility] Test Directory  ([email or web address]).  Performed By: [facility]  [address] [place] [address]  : Ev Rose MD     IGM SERUM QUANT   Result Value Ref Range    Immunoglobulin M 42.00 35.00 - 263.00 mg/dL      Comment:       "REFERENCE INTERVAL: Immunoglobulin M  Access complete set of age- and/or gender-specific reference  intervals for this test in the [facility] Test Directory  ([email or web address].com).  Performed By: [facility]  [address] [place] [address]  : Ev Rose MD     IGA SERUM QUANT   Result Value Ref Range    Immunoglobulin A 246.0 68.0 - 408.0 mg/dL      Comment:      REFERENCE INTERVAL: Immunoglobulin A  Access complete set of age- and/or gender-specific reference  intervals for this test in the [facility] Test Directory  ([email or web address]).  Performed By: [facility]  [address] [place] [address]  : Ev Rose MD     ANTI-DNA(DS)   Result Value Ref Range    Anti-Dna -Ds 356 (H) 0 - 24 IU      Comment:      INTERPRETIVE INFORMATION: Double-Stranded DNA (dsDNA)  Ab IgG KAYLEN  24 IU or less........Negative  25-30 IU.............Borderline Positive  30-60 IU.............Low Positive   IU............Positive  201 IU or greater....Strong Positive  Positivity for anti-double stranded DNA (anti-dsDNA) IgG antibody  is a diagnostic criterion of systemic lupus erythematosus (SLE).  Specimens are initially screened by enzyme-linked immunosorbent  assay (KAYLEN). All KAYLEN results reported as \"detected\" (positive)  are confirmed by a highly specific IFA titer (Crithidia luciliae  indirect fluorescent test [ANGEL]). Some patients with early or  inactive SLE may be positive for anti-dsDNA IgG by KAYLEN but  negative by ANGEL. If the patient is negative by ANGEL but  positive by KAYLEN and clinical suspicion remains, consider  antinuclear antibody (JARRETT) testing by IFA. Additional information  and recommendations for testing may be found at  [email or web address] [email or web address]  ndex.[email or web address].  Performed By: [facility]  [address] [place] [address]  : Ev Rose MD     NEIL-1 " AB,IGG   Result Value Ref Range    Joslyn-1 Antibody, IgG 2 0 - 40 AU/mL      Comment:      INTERPRETIVE INFORMATION:  Joslyn-1 Antibody, IgG  29 AU/mL or less.........Negative  30-40 AU/mL..............Equivocal  41 AU/mL or greater......Positive  Presence of Joslyn-1 (antihistidyl transfer RNA [t-RNA] synthetase)  antibody is associated with polymyositis and may also be seen in  patients with dermatomyositis. Joslyn-1 antibody is associated with  pulmonary involvement (interstitial lung disease), Raynaud  phenomenon, arthritis, and 's hands (implicated in  antisynthetase syndrome).  Performed By: [facility]  [address] [place] [id number]  : Ev Rose MD     ANTI SCL-70 ABS   Result Value Ref Range    Anti-Scl-70 1 0 - 40 AU/mL      Comment:      INTERPRETIVE INFORMATION: Scleroderma (Scl-70) (KAVEH) Ab, IgG  29 AU/mL or Less ............. Negative  30 - 40 AU/mL ................ Equivocal  41 AU/mL or Greater .......... Positive  The presence of Scl-70 antibodies (also referred to as  topoisomerase I, leanne-I or TIRSO) is considered diagnostic for  systemic sclerosis (SSc). Scl-70 antibodies alone are detected in  about 20 percent of SSc patients and are associated with the  diffuse form of the disease, which may include specific organ  involvement and poor prognosis. Scl-70 antibodies have also been  reported in a varying percentage of patients with systemic lupus  erythematosus (SLE). Scl-70 (leanne-1) is a DNA binding protein and  anti-DNA/DNA complexes in the sera of SLE patients may bind to  leanne-I, leading to a false-positive result. The presence of Scl-70  antibody in sera may also be due to contamination of recombinant  Scl-70 with DNA derived from cellular material used in  immunoassays. Strong clinical correlation is recomm ended if both  Scl-70 and dsDNA antibodies are detected.  Negative results do not necessarily rule out the presence of SSc.  If clinical suspicion  remains, consider further testing for  centromere, RNA polymerase III and U3-RNP, PM/Scl, or Th/To  antibodies.  Performed By: [facility]  [address] [place] [address]  : Ev Rose MD     SANCHEZ AB IGG   Result Value Ref Range    Sanchez Antibodies 2 0 - 40 AU/mL      Comment:      INTERPRETIVE INFORMATION: Sanchez (KAVEH) Antibody, IgG  29 AU/mL or Less ............. Negative  30 - 40 AU/mL ................ Equivocal  41 AU/mL or Greater .......... Positive  Sanchez antibody is highly specific (greater than 90 percent) for  systemic lupus erythematosus (SLE) but only occurs in 30-35  percent of SLE cases. The presence of antibodies to Sanchez has  variable associations with SLE clinical manifestations.  Performed By: [facility]  [address] [place] [address]  : Ev Rose MD     SSB(LA)(KAVEH)IGG AB   Result Value Ref Range    Sjogren'S Anti-Ss-B 1 0 - 40 AU/mL      Comment:      INTERPRETIVE INFORMATION: SSB (La) (KAVEH) Ab, IgG  29 AU/mL or Less ............. Negative  30 - 40 AU/mL ................ Equivocal  41 AU/mL or Greater .......... Positive  SSB (La) antibody is seen in 50-60% of Sjogren syndrome cases and  is specific if it is the only KAVEH antibody present. 15-25% of  patients with systemic lupus erythematosus (SLE) and 5-10% of  patients with progressive systemic sclerosis (PSS) also have this  antibody.  Performed By: [facility]  [address] [place] [address]  : Ev Rose MD     SSA 52 AND 60 (RO)(KAVEH) AB, IGG   Result Value Ref Range    SSA 52 (R0)(KAVEH) Ab, IgG 1 0 - 40 AU/mL      Comment:      INTERPRETIVE INFORMATION: SSA-52 (Ro52) (KAVEH) Antibody, IgG  29 AU/mL or Less ............. Negative  30 - 40 AU/mL ................ Equivocal  41 AU/mL or Greater .......... Positive  SSA-52 (Ro52) and/or SSA-60 (Ro60) antibodies are associated with  a diagnosis of Sjogren syndrome, systemic  lupus erythematosus  (SLE), and systemic sclerosis. SSA-52 antibody overlaps  significantly with the major SSc-related antibodies. SSA-52 (Ro52)  antibody occurs frequently in patients with inflammatory  myopathies, often in the presence of interstitial lung disease.      SSA 60 (R0)(KAVEH) Ab, IgG 1 0 - 40 AU/mL      Comment:      REFERENCE INTERVAL: SSA-60 (Ro60) (KAVEH) Antibody, IgG  29 AU/mL or Less ............. Negative  30 - 40 AU/mL ................ Equivocal  41 AU/mL or Greater .......... Positive  Performed By: [facility]  [address] [place] [address]  : Ev Rose MD     DSDNA IGG TITER BY IFA (REFLEX)   Result Value Ref Range    Dble Strand DNA Ab Titer >1:2560 (H) <1:10      Comment:      INTERPRETIVE INFORMATION: Double-Stranded DNA (dsDNA) Antibody,  IgG by IFA (using Crithidia luciliae)  Positivity for anti-double stranded DNA (anti-dsDNA) IgG antibody  is a diagnostic criterion of systemic lupus erythematosus (SLE).  The presence of the anti-dsDNA IgG antibody is identified by IFA  titer (Crithidia luciliae indirect fluorescent test [ANGEL]).  ANGEL is highly specific for SLE with a sensitivity of 50-60  percent.  Some patients with early or inactive SLE may be positive for  anti-dsDNA IgG by KAYLEN but negative by ANGEL. If the ANGEL result  is negative but the patient has a positive KAYLEN and clinical  suspicion remains, consider antinuclear antibody (JARRETT) testing by  IFA. Additional information and recommendations for testing may be  found at  http://[email or web address]/[email or web address]  ndex.html.  Performed By: [facility]  [address] [place] [address]  : Ev Rose MD             Sincerely,      Ryan De Jesus M.D.    Electronically Signed

## 2021-06-14 NOTE — LETTER
June 14, 2021         Karen Dimas  [address] [place] [address]        Dear Karen:      Below are the results from your recent visit:    Resulted Orders   AFP SERUM TUMOR MARKER   Result Value Ref Range    Alpha Fetoprotein 2 0 - 9 ng/mL      Comment:      INTERPRETIVE INFORMATION: Alpha Fetoprotein Tumor Marker  The Tonia Sim Access DxI AFP method is used. Results  obtained with different assay methods or kits cannot be used  interchangeably. AFP is a valuable aid in the management of  nonseminomatous testicular cancer patients when used in  conjunction with information available from the clinical  evaluation and other diagnostic procedures. Increased AFP  concentrations have also been observed in ataxia telangiectasia,  hereditary tyrosinemia, primary hepatocellular carcinoma,  teratocarcinoma, gastrointestinal tract cancers with and without  liver metastases, and in benign hepatic conditions such as acute  viral hepatitis, chronic active hepatitis, and cirrhosis. The  result cannot be interpreted as absolute evidence of the presence  or absence of malignant disease. The result is not interpretable  as a tumor marker in pregnant females.  Access complete set of age- and/or gender-specific reference  intervals for this test in the  [facility] Test Directory  (aruplab.com).  Performed By: [facility]  [address] [place] [id number]  : Ev Rose MD     JARRETT REFLEXIVE PROFILE   Result Value Ref Range    Antinuclear Antibody Detected (A) None Detected      Comment:      Antibodies to Anti-Nuclear Antibodies (JARRETT) detected. Additional  testing to follow.  INTERPRETIVE INFORMATION: Anti-Nuclear Antibodies (JARRETT), IgG by  KAYLEN  Antinuclear Antibodies (JARRETT), IgG by KAYLEN: JARRETT specimens are  screened using enzyme-linked immunosorbent assay (KAYLEN)  methodology. All KAYLEN results reported as Detected are further  tested by indirect fluorescent assay (IFA) using  HEp-2 substrate  with an IgG-specific conjugate. The JARRETT KAYLEN screen is designed  to detect antibodies against dsDNA, histones, SS-A (Ro), SS-B  (La), Sanchez, Sanchez/RNP, Scl-70, Joslyn-1, centromeric proteins, other  antigens extracted from the HEp-2 cell nucleus. JARRETT KAYLEN assays  have been reported to have lower sensitivities than JARRETT IFA for  systemic autoimmune rheumatic diseases (SARD).  Negative results do not necessarily rule out SARD.  Performed By: [facility] [place] [address]  : Ev Rose MD     IRON/TOTAL IRON BIND   Result Value Ref Range    Iron 84 40 - 170 ug/dL    Total Iron Binding 279 250 - 450 ug/dL    Unsat Iron Binding 195 110 - 370 ug/dL    % Saturation 30 15 - 55 %   FERRITIN   Result Value Ref Range    Ferritin 114.0 10.0 - 291.0 ng/mL   CERULOPLASMIN   Result Value Ref Range    Ceruloplasmin 25 17 - 54 mg/dL      Comment:      REFERENCE INTERVAL: Ceruloplasmin  Access complete set of age- and/or gender-specific reference  intervals for this test in the [facility] Test Directory  (aruplab.com).  Performed By: [facility] [place] [address]  : Ev Rose MD     SPEP W/REFLEX TO MANDA, A, G, M   Result Value Ref Range    Albumin 3.94 3.75 - 5.01 g/dL    Alpha-1 Globulin 0.33 0.19 - 0.46 g/dL    Alpha-2 Globulin 0.86 0.48 - 1.05 g/dL    Beta Globulin 0.75 0.48 - 1.10 g/dL    Gamma Globulin 1.52 (H) 0.62 - 1.51 g/dL    Interpretation See Note       Comment:      Slight increase in the gamma region.  MANDA shows a normal  pattern; no monoclonal proteins seen.      MANDA Reflex MANDA Done     Total Protein, Serum 7.4 6.3 - 8.2 g/dL    EER Serum Prot. Electro. Reflex See Note       Comment:      Access [facility] Enhanced Report using the link below:    -Direct access: [email or web address]  Performed By: [facility] [place]  [address]  : Ev Rose MD     HEP B SURFACE ANTIGEN   Result Value Ref Range    Hepatitis B Surface Antigen Non-Reactive Non-Reactive      Comment:      It has been reported that certain assays will not detect all HBV mutants.  If acute or chronic HBV infection is suspected and the HBsAg result is  negative, it is recommended that other serological markers be tested to  confirm the HBsAg nonreactivity.  HBsAg test results should always be  assessed in conjunction with the patient's medical history, clinical  examination, and other findings.    Note: Assay performance characteristics have not been established when the  Elecsys HBsAg II assay is used in conjunction with other manufacturers'  assays for specific HBV serological markers. Assay performance  characteristics have not been established for testing of newborns.       JARRETT HEp-2 Substrate, IgG IFA   Result Value Ref Range    Antinuclear Antibody (JARRETT), HEp-2, IgG Detected (H) <1:80    JARRETT Interpretive Comment See Note       Comment:      Homogeneous Pattern  Clinical associations: SLE, drug-induced SLE or GARRET.  Main autoantibodies: Anti-dsDNA, anti-histones or anti-chromatin  (anti-nucleosome)  List of Abbreviations  Antisynthetase syndrome (ARS), chronic active hepatitis (CAH),  inflammatory  myopathies (IM) [dermatomyositis (DM), polymyositis  (PM), necrotizing autoimmune myopathy (NAM)], interstitial lung  disease (ILD), juvenile idiopathic arthritis (GARRET), mixed  connective tissue disease (MCTD), primary biliary cholangitis  (PBC), rheumatoid arthritis (RA), systemic autoimmune rheumatic  diseases (SARD), Sjogren syndrome (SjS), systemic lupus  erythematosus (SLE), systemic sclerosis (SSc), undifferentiated  connective tissue disease (UCTD).  INTERPRETIVE INFORMATION: JARRETT Interpretive Comment  Presence of antinuclear antibodies (JARRETT) is a hallmark feature of  systemic autoimmune rheumatic diseases (SARD). However, JARRETT lacks  diagnostic  specificity and is associated with a variety of  diseases (cancers, autoimmune,  infectious, and inflammatory  conditions)  and may also occur in healthy individuals in varying  prevalence. The lack of diagnostic specificity requires  confirmation of positive JARRETT by more specific serologic tests. JARRETT  (nuclear reactivity) positive patterns reported include  centromere, homogeneous, nuclear dots, nucleolar, or speckled. JARRETT  (cytoplasmic reactivity) positive patterns reported include  reticular/AMA, discrete/GW body-like, polar/golgi-like,  cytoplasmic speckled or rods and rings. All positive patterns are  reported to endpoint titers (1:2560). Reported patterns may help  guide differential diagnosis, although they may not be specific  for individual antibodies or diseases. Mitotic staining patterns  not reported.  Negative results do not necessarily rule out SARD.  Performed By: [facility]  [address] [place] [address]  : Ev Rose MD     Antinuclear Ab, Single Pattern   Result Value Ref Range    JARRETT Pattern Homogeneous (A)     JARRETT Titer >1:2560 (H)       Comment:      Extractable Nuclear Antigen Antibodies (RNP, Sanchez, SSA 52, SSA  60, Scleroderma, Joslyn-1, and SSB), and Double Stranded DNA (dsDNA)  Antibody IgG to follow.  Performed By: [facility]  [address] [place] [address]  : Ev Rose MD     IGG SERUM QUANT   Result Value Ref Range    Immunoglobulin G 1579.0 768.0 - 1632.0 mg/dL      Comment:      REFERENCE INTERVAL: Immunoglobulin G  Access complete set of age- and/or gender-specific reference  intervals for this test in the [facility] Test Directory  ([email or web address]).  Performed By: [facility]  [address] [place] [address]  : Ev Rose MD     IGM SERUM QUANT   Result Value Ref Range    Immunoglobulin M 42.00 35.00 - 263.00 mg/dL      Comment:      REFERENCE INTERVAL:  "Immunoglobulin M  Access complete set of age- and/or gender-specific reference  intervals for this test in the [facility] Test Directory  (aruplab.com).  Performed By: [facility]  [address] [place] [address]  : Ev Rose MD     IGA SERUM QUANT   Result Value Ref Range    Immunoglobulin A 246.0 68.0 - 408.0 mg/dL      Comment:      REFERENCE INTERVAL: Immunoglobulin A  Access complete set of age- and/or gender-specific reference  intervals for this test in the [facility] Test Directory  (aruplab.com).  Performed By: [facility]  [address] [place] [address]  : Ev Rose MD     ANTI-DNA(DS)   Result Value Ref Range    Anti-Dna -Ds 356 (H) 0 - 24 IU      Comment:      INTERPRETIVE INFORMATION: Double-Stranded DNA (dsDNA)  Ab IgG KAYLEN  24 IU or less........Negative  25-30 IU.............Borderline Positive  30-60 IU.............Low Positive   IU............Positive  201 IU or greater....Strong Positive  Positivity for anti-double stranded DNA (anti-dsDNA) IgG antibody  is a diagnostic criterion of systemic lupus erythematosus (SLE).  Specimens are initially screened by enzyme-linked immunosorbent  assay (KAYLEN). All KAYLEN results reported as \"detected\" (positive)  are confirmed by a highly specific IFA titer (Crithidia luciliae  indirect fluorescent test [ANGEL]). Some patients with early or  inactive SLE may be positive for anti-dsDNA IgG by KAYLEN but  negative by ANGEL. If the patient is negative by ANGEL but  positive by KAYLEN and clinical suspicion remains, consider  antinuclear antibody (JARRETT) testing by IFA. Additional information  and recommendations for testing may be found at  http://www.[employer].com/Topics/AutoimmuneDz/ConnectiveTi ssueDz/i  ndex.html.  Performed By: [facility]  [address] [place] [address]  : Ev Rose MD     NEIL-1 AB,IGG   Result Value " Ref Range    Joslyn-1 Antibody, IgG 2 0 - 40 AU/mL      Comment:      INTERPRETIVE INFORMATION:  Joslyn-1 Antibody, IgG  29 AU/mL or less.........Negative  30-40 AU/mL..............Equivocal  41 AU/mL or greater......Positive  Presence of Joslyn-1 (antihistidyl transfer RNA [t-RNA] synthetase)  antibody is associated with polymyositis and may also be seen in  patients with dermatomyositis. Joslyn-1 antibody is associated with  pulmonary involvement (interstitial lung disease), Raynaud  phenomenon, arthritis, and 's hands (implicated in  antisynthetase syndrome).  Performed By: [facility]  [address] [place] [address]  : Ev Rose MD     ANTI SCL-70 ABS   Result Value Ref Range    Anti-Scl-70 1 0 - 40 AU/mL      Comment:      INTERPRETIVE INFORMATION: Scleroderma (Scl-70) (KAVEH) Ab, IgG  29 AU/mL or Less ............. Negative  30 - 40 AU/mL ................ Equivocal  41 AU/mL or Greater .......... Positive  The presence of Scl-70 antibodies (also referred to as  topoisomerase I, leanne-I or TIRSO) is considered diagnostic for  systemic sclerosis (SSc). Scl-70 antibodies alone are detected in  about 20 percent of SSc patients and are associated with the  diffuse form of the disease, which may include specific organ  involvement and poor prognosis. Scl-70 antibodies have also been  reported in a varying percentage of patients with systemic lupus  erythematosus (SLE). Scl-70 (leanne-1) is a DNA binding protein and  anti-DNA/DNA complexes in the sera of SLE patients may bind to  leanne-I, leading to a false-positive result. The presence of Scl-70  antibody in sera may also be due to contamination of recombinant  Scl-70 with DNA derived from cellular material used in  immunoassays. Strong clinical correlation is recomm ended if both  Scl-70 and dsDNA antibodies are detected.  Negative results do not necessarily rule out the presence of SSc.  If clinical suspicion remains, consider further  testing for  centromere, RNA polymerase III and U3-RNP, PM/Scl, or Th/To  antibodies.  Performed By: [facility]  [address] [place] [address]  : Ev Rose MD     SANCHEZ AB IGG   Result Value Ref Range    Sanchez Antibodies 2 0 - 40 AU/mL      Comment:      INTERPRETIVE INFORMATION: Sanchez (KAVEH) Antibody, IgG  29 AU/mL or Less ............. Negative  30 - 40 AU/mL ................ Equivocal  41 AU/mL or Greater .......... Positive  Sanchez antibody is highly specific (greater than 90 percent) for  systemic lupus erythematosus (SLE) but only occurs in 30-35  percent of SLE cases. The presence of antibodies to Sanchez has  variable associations with SLE clinical manifestations.  Performed By: [facility]  [address] [place] [address]  : Ev Rose MD     SSB(LA)(KAVEH)IGG AB   Result Value Ref Range    Sjogren'S Anti-Ss-B 1 0 - 40 AU/mL      Comment:      INTERPRETIVE INFORMATION: SSB (La) (KAVEH) Ab, IgG  29 AU/mL or Less ............. Negative  30 - 40 AU/mL ................ Equivocal  41 AU/mL or Greater .......... Positive  SSB (La) antibody is seen in 50-60% of Sjogren syndrome cases and  is specific if it is the only KAVEH antibody present. 15-25% of  patients with systemic lupus erythematosus (SLE) and 5-10% of  patients with progressive systemic sclerosis (PSS) also have this  antibody.  Performed By: [facility]  [address] [place], [place] [id number]  : Ev Rose MD     SSA 52 AND 60 (RO)(KAVEH) AB, IGG   Result Value Ref Range    SSA 52 (R0)(KAVEH) Ab, IgG 1 0 - 40 AU/mL      Comment:      INTERPRETIVE INFORMATION: SSA-52 (Ro52) (KAVEH) Antibody, IgG  29 AU/mL or Less ............. Negative  30 - 40 AU/mL ................ Equivocal  41 AU/mL or Greater .......... Positive  SSA-52 (Ro52) and/or SSA-60 (Ro60) antibodies are associated with  a diagnosis of Sjogren syndrome, systemic lupus  erythematosus  (SLE), and systemic sclerosis. SSA-52 antibody overlaps  significantly with the major SSc-related antibodies. SSA-52 (Ro52)  antibody occurs frequently in patients with inflammatory  myopathies, often in the presence of interstitial lung disease.      SSA 60 (R0)(KAVEH) Ab, IgG 1 0 - 40 AU/mL      Comment:      REFERENCE INTERVAL: SSA-60 (Ro60) (KAVEH) Antibody, IgG  29 AU/mL or Less ............. Negative  30 - 40 AU/mL ................ Equivocal  41 AU/mL or Greater .......... Positive  Performed By: [facility]  [address] [place] [address]  : Ev Rose MD     DSDNA IGG TITER BY IFA (REFLEX)   Result Value Ref Range    Dble Strand DNA Ab Titer >1:2560 (H) <1:10      Comment:      INTERPRETIVE INFORMATION: Double-Stranded DNA (dsDNA) Antibody,  IgG by IFA (using Crithidia luciliae)  Positivity for anti-double stranded DNA (anti-dsDNA) IgG antibody  is a diagnostic criterion of systemic lupus erythematosus (SLE).  The presence of the anti-dsDNA IgG antibody is identified by IFA  titer (Crithidia luciliae indirect fluorescent test [ANGEL]).  ANGEL is highly specific for SLE with a sensitivity of 50-60  percent.  Some patients with early or inactive SLE may be positive for  anti-dsDNA IgG by KAYLEN but negative by ANGEL. If the ANGEL result  is negative but the patient has a positive KAYLEN and clinical  suspicion remains, consider antinuclear antibody (JARRETT) testing by  IFA. Additional information and recommendations for testing may be  found at  http://www.[employer]i.com/Topics/AutoimmuneDz/ConnectiveTissueDz/i  ndex.html.  Performed By: [facility]  [address] [place] [address]  : Ev Rose MD       This is a printed copy for your gastroenterologist.    If you have any questions or concerns, please don't hesitate to call.        Sincerely,      Ryan De Jesus M.D.    Electronically Signed

## 2021-06-24 ENCOUNTER — TELEPHONE (OUTPATIENT)
Dept: MEDICAL GROUP | Facility: MEDICAL CENTER | Age: 71
End: 2021-06-24

## 2021-06-25 ENCOUNTER — TELEPHONE (OUTPATIENT)
Dept: MEDICAL GROUP | Facility: MEDICAL CENTER | Age: 71
End: 2021-06-25

## 2021-06-26 NOTE — TELEPHONE ENCOUNTER
Patient has a sore throat and had a little redness of the phlegm.  Brushing teeth she was bleeding.  No longer doing that.    Her urinary symptoms are escalating.  She has not yet had the blood work or the urine culture.  She will be doing that on Tuesday.  We will hold off on treatment for now until the urine is collected.  She will let me know Tuesday if the symptoms are persisting.

## 2021-06-29 ENCOUNTER — HOSPITAL ENCOUNTER (OUTPATIENT)
Dept: LAB | Facility: MEDICAL CENTER | Age: 71
End: 2021-06-29
Attending: FAMILY MEDICINE
Payer: MEDICARE

## 2021-06-29 DIAGNOSIS — Z87.440 HISTORY OF URINARY TRACT INFECTION: ICD-10-CM

## 2021-06-29 DIAGNOSIS — R76.0 ANTINUCLEAR ANTIBODY (ANA) TITER GREATER THAN 1:80: ICD-10-CM

## 2021-06-29 DIAGNOSIS — R76.8 ANA POSITIVE: ICD-10-CM

## 2021-06-29 DIAGNOSIS — M79.10 MYALGIA: ICD-10-CM

## 2021-06-29 DIAGNOSIS — R82.90 ABNORMAL FINDING ON URINALYSIS: ICD-10-CM

## 2021-06-29 LAB
C3 SERPL-MCNC: 127.2 MG/DL (ref 87–200)
C4 SERPL-MCNC: 20.2 MG/DL (ref 19–52)
ERYTHROCYTE [SEDIMENTATION RATE] IN BLOOD BY WESTERGREN METHOD: 58 MM/HOUR (ref 0–25)

## 2021-06-29 PROCEDURE — 85652 RBC SED RATE AUTOMATED: CPT

## 2021-06-29 PROCEDURE — 36415 COLL VENOUS BLD VENIPUNCTURE: CPT

## 2021-06-29 PROCEDURE — 87086 URINE CULTURE/COLONY COUNT: CPT

## 2021-06-29 PROCEDURE — 86160 COMPLEMENT ANTIGEN: CPT

## 2021-07-01 LAB
BACTERIA UR CULT: NORMAL
SIGNIFICANT IND 70042: NORMAL
SITE SITE: NORMAL
SOURCE SOURCE: NORMAL

## 2021-07-27 ENCOUNTER — PATIENT OUTREACH (OUTPATIENT)
Dept: HEALTH INFORMATION MANAGEMENT | Facility: OTHER | Age: 71
End: 2021-07-27

## 2021-07-27 NOTE — NON-PROVIDER
Outcome: Scheduled in-home Comprehensive Health Assessment appointment: Monday 8/24/21    Outbound call to [facility] and scheduled in-home Comprehensive Health Assessment appointment. No further needs at this time.    Attempt # 1

## 2021-08-16 ENCOUNTER — HOSPITAL ENCOUNTER (OUTPATIENT)
Facility: MEDICAL CENTER | Age: 71
End: 2021-08-16
Attending: INTERNAL MEDICINE | Admitting: INTERNAL MEDICINE
Payer: MEDICARE

## 2021-08-24 PROBLEM — F33.0 MILD EPISODE OF RECURRENT MAJOR DEPRESSIVE DISORDER (HCC): Status: ACTIVE | Noted: 2021-08-24

## 2021-08-25 PROBLEM — E78.2 MIXED HYPERLIPIDEMIA: Status: RESOLVED | Noted: 2021-01-20 | Resolved: 2021-08-25

## 2021-09-07 ENCOUNTER — PRE-ADMISSION TESTING (OUTPATIENT)
Dept: ADMISSIONS | Facility: MEDICAL CENTER | Age: 71
End: 2021-09-07
Attending: INTERNAL MEDICINE
Payer: MEDICARE

## 2021-09-07 RX ORDER — PANTOPRAZOLE SODIUM 40 MG/1
40 TABLET, DELAYED RELEASE ORAL DAILY
COMMUNITY
End: 2022-04-20

## 2021-09-17 ENCOUNTER — APPOINTMENT (OUTPATIENT)
Dept: RADIOLOGY | Facility: MEDICAL CENTER | Age: 71
End: 2021-09-17
Attending: INTERNAL MEDICINE
Payer: MEDICARE

## 2021-09-26 ENCOUNTER — TELEPHONE (OUTPATIENT)
Dept: MEDICAL GROUP | Facility: PHYSICIAN GROUP | Age: 71
End: 2021-09-26

## 2021-09-26 RX ORDER — DICYCLOMINE HYDROCHLORIDE 10 MG/1
10 CAPSULE ORAL
Qty: 120 CAPSULE | Refills: 0 | Status: SHIPPED | OUTPATIENT
Start: 2021-09-26 | End: 2021-11-30

## 2021-09-26 RX ORDER — ONDANSETRON 4 MG/1
4 TABLET, FILM COATED ORAL EVERY 4 HOURS PRN
Qty: 20 TABLET | Refills: 1 | Status: SHIPPED | OUTPATIENT
Start: 2021-09-26 | End: 2021-10-03

## 2021-09-26 NOTE — TELEPHONE ENCOUNTER
After hours phone call  Call on 9/26/2021 at 7:48 AM from Karen Dimas [phone] (home)  who reports PCP: Ryan De Jesus M.D..  Pt reports: She tested positive for COVID-19 using a home care. She has been complaining of vomiting and diarrhea. She denies any respiratory symptoms. She’s fully vaccinated. She is requesting medication for symptomatic relief. I sent in a prescription for Zofran and Bentyl.  Anthony Correia M.D.

## 2021-11-10 ENCOUNTER — TELEPHONE (OUTPATIENT)
Dept: MEDICAL GROUP | Facility: MEDICAL CENTER | Age: 71
End: 2021-11-10

## 2021-11-10 DIAGNOSIS — Z86.16 HISTORY OF 2019 NOVEL CORONAVIRUS DISEASE (COVID-19): ICD-10-CM

## 2021-11-10 DIAGNOSIS — R07.89 OTHER CHEST PAIN: ICD-10-CM

## 2021-11-10 NOTE — TELEPHONE ENCOUNTER
Pt called and stated she will be coming into an appt on the 30th and is requesting to do a chest x-ray before her appt, please advise.

## 2021-11-11 PROBLEM — R07.89 OTHER CHEST PAIN: Status: ACTIVE | Noted: 2021-11-11

## 2021-11-11 PROBLEM — Z86.16 HISTORY OF 2019 NOVEL CORONAVIRUS DISEASE (COVID-19): Status: ACTIVE | Noted: 2021-11-11

## 2021-11-11 NOTE — TELEPHONE ENCOUNTER
I will start with a chest x-ray.  We can discuss the sonogram when I see her.  I have placed a chest x-ray order.  This can be done as a walk-in at [facility] but it is probably best to schedule an appointment.  She would call [phone] to schedule.

## 2021-11-11 NOTE — TELEPHONE ENCOUNTER
I can order a chest x-ray but I need the diagnosis code for that.  Why does she want a chest x-ray?  Is she having cough, shortness of breath etc.?

## 2021-11-16 ENCOUNTER — HOSPITAL ENCOUNTER (OUTPATIENT)
Dept: RADIOLOGY | Facility: MEDICAL CENTER | Age: 71
End: 2021-11-16
Attending: FAMILY MEDICINE
Payer: MEDICARE

## 2021-11-16 DIAGNOSIS — Z86.16 HISTORY OF 2019 NOVEL CORONAVIRUS DISEASE (COVID-19): ICD-10-CM

## 2021-11-16 DIAGNOSIS — R07.89 OTHER CHEST PAIN: ICD-10-CM

## 2021-11-16 PROCEDURE — 71046 X-RAY EXAM CHEST 2 VIEWS: CPT

## 2021-11-29 ENCOUNTER — TELEPHONE (OUTPATIENT)
Dept: MEDICAL GROUP | Facility: MEDICAL CENTER | Age: 71
End: 2021-11-29

## 2021-11-29 NOTE — LETTER
[facility]  Ryan De Jesus M.D.  [address] [place] [address]  [place] [address]  Fax: [phone]   Authorization for Release/Disclosure of   Protected Health Information   Name: KAREN DIMAS : 1950 SSN: [id number]   Address: [address]  [place] [address] Phone:    [phone] (home)    I authorize the entity listed below to release/disclose the PHI below to:   [facility]/Ryan De Jesus M.D. and Ryan De Jesus M.D.   Provider or Entity Name:  [facility]   Address   City, [facility], [facility]   Phone:      Fax:  [phone]   Reason for request: continuity of care   Information to be released:    [  ] LAST COLONOSCOPY,  including any PATH REPORT and follow-up  [  ] LAST FIT/COLOGUARD RESULT [  ] LAST DEXA  [  ] LAST MAMMOGRAM  [  ] LAST PAP  [  ] LAST LABS [  ] RETINA EXAM REPORT  [  ] IMMUNIZATION RECORDS  [XXXXX] Release all info      [  ] Check here and initial the line next to each item to release ALL health information INCLUDING  _____ Care and treatment for drug and / or alcohol abuse  _____ HIV testing, infection status, or AIDS  _____ Genetic Testing    DATES OF SERVICE OR TIME PERIOD TO BE DISCLOSED: _____________  I understand and acknowledge that:  * This Authorization may be revoked at any time by you in writing, except if your health information has already been used or disclosed.  * Your health information that will be used or disclosed as a result of you signing this authorization could be re-disclosed by the recipient. If this occurs, your re-disclosed health information may no longer be protected by State or Federal laws.  * You may refuse to sign this Authorization. Your refusal will not affect your ability to obtain treatment.  * This Authorization becomes effective upon signing and will  on (date) __________.      If no date is indicated, this Authorization will  one (1) year from the signature date.    Name: Karen Dimas    Signature: Continuity of  Care   Date:     11/29/2021       PLEASE FAX REQUESTED RECORDS BACK TO: [phone]

## 2021-11-29 NOTE — TELEPHONE ENCOUNTER
ESTABLISHED PATIENT PRE-VISIT PLANNING     Phone Number Called: [phone] (home)   Call outcome: Spoke to patient regarding message below.  Message: Appointment scheduled with , Tuesday, 11/30/2021 at 4:30 PM, [address], [place]. Arrive 10-15 minutes early for check-in.    Patient was contacted to complete PVP.     Note: Patient will not be contacted if there is no indication to call.     1.  Reviewed notes from the last few office visits within the medical group: Yes    2.  If any orders were placed at last visit or intended to be done for this visit (i.e. 6 mos follow-up), do we have Results/Consult Notes?         •  Labs - Labs ordered, completed on 06/29/2021 and results are in chart.  Note: If patient appointment is for lab review and patient did not complete labs, check with provider if OK to reschedule patient until labs completed.       •  Imaging - Imaging ordered, completed and results are in chart.       •  Referrals - Referral ordered, patient was seen and consult notes were requested from [facility]. Care Teams updated  YES.      -Rheumatology: Per patient was seen by [facility]. Medical Records requested via fax.     -[facility]: Dr. Alfie Salvador M.D. Consultation Notes for encounter date 08/06/2021 is in patient's chart. Care Teams updated.   -    3. Is this appointment scheduled as a Hospital Follow-Up? No    4.  Immunizations were updated in Epic using Reconcile Outside Information activity? Yes    5.  Patient is due for the following Health Maintenance Topics:   Health Maintenance Due   Topic Date Due   • IMM ZOSTER VACCINES (1 of 2) Never done   • IMM INFLUENZA (1) 09/01/2021   • COVID-19 Vaccine (3 - Booster for Pfizer series) 10/16/2021     6.  AHA (Pulse8) form printed for Provider? No, patient does not have any open alerts

## 2021-11-30 ENCOUNTER — OFFICE VISIT (OUTPATIENT)
Dept: MEDICAL GROUP | Facility: MEDICAL CENTER | Age: 71
End: 2021-11-30
Payer: MEDICARE

## 2021-11-30 VITALS
SYSTOLIC BLOOD PRESSURE: 150 MMHG | HEART RATE: 75 BPM | HEIGHT: 62 IN | DIASTOLIC BLOOD PRESSURE: 84 MMHG | TEMPERATURE: 97.8 F | WEIGHT: 140.2 LBS | BODY MASS INDEX: 25.8 KG/M2 | OXYGEN SATURATION: 97 %

## 2021-11-30 DIAGNOSIS — M79.18 MYOFASCIAL PAIN ON LEFT SIDE: ICD-10-CM

## 2021-11-30 DIAGNOSIS — F41.8 SITUATIONAL ANXIETY: ICD-10-CM

## 2021-11-30 DIAGNOSIS — M79.10 MUSCLE TENDERNESS: ICD-10-CM

## 2021-11-30 DIAGNOSIS — R07.81 RIB PAIN ON LEFT SIDE: ICD-10-CM

## 2021-11-30 DIAGNOSIS — Z23 NEED FOR IMMUNIZATION AGAINST INFLUENZA: ICD-10-CM

## 2021-11-30 PROCEDURE — 90662 IIV NO PRSV INCREASED AG IM: CPT | Performed by: FAMILY MEDICINE

## 2021-11-30 PROCEDURE — 99213 OFFICE O/P EST LOW 20 MIN: CPT | Mod: 25 | Performed by: FAMILY MEDICINE

## 2021-11-30 PROCEDURE — G0008 ADMIN INFLUENZA VIRUS VAC: HCPCS | Performed by: FAMILY MEDICINE

## 2021-11-30 RX ORDER — ALPRAZOLAM 0.5 MG/1
0.5 TABLET ORAL 2 TIMES DAILY PRN
Qty: 60 TABLET | Refills: 0 | Status: SHIPPED | OUTPATIENT
Start: 2021-11-30 | End: 2022-03-01 | Stop reason: SDUPTHER

## 2021-11-30 ASSESSMENT — FIBROSIS 4 INDEX: FIB4 SCORE: 1.76

## 2021-12-01 NOTE — PROGRESS NOTES
Chief Complaint   Patient presents with   • Bump     left of ribs under breast and is moving to right       Subjective:     HPI:   Karen Dimas presents today with the followin. Muscle tenderness/Myofascial pain on left side/Rib pain on left side  Patient has noted in the last month or so nodules that are enlarging on the left side under the breast just anterior to her left lower ribs.  They are palpable and tender.  They seem somewhat firm.  They are somewhat mobile as well.  They do not appear to be attached to the ribs though that is difficult to determine.  Discussed x-ray series of the ribs.  If there is no rib involvement and these are noncalcified I will presume that they are myositis type nodules which is what they do feel like.    2. Need for immunization against influenza  HD flu vaccine administered today    3. Situational anxiety  Patient does have some anxiety and some intermittent pain.  She states the alprazolam does seem to relax the muscles and improve the pain.  It is also very helpful for anxiety and for her to get to sleep when she needs it.  She has made 60 tablets last around 5 months.  PDMP review shows no inconsistencies.  The medication is renewed.  - ALPRAZolam (XANAX) 0.5 MG Tab; Take 1 Tablet by mouth 2 times a day as needed for Sleep or Anxiety for up to 30 days. 60 tablets is a 30 day quantity  Dispense: 60 Tablet; Refill: 0      Patient Active Problem List    Diagnosis Date Noted   • Myofascial pain on left side 2021   • Muscle tenderness 2021   • Rib pain on left side 2021   • Other chest pain 2021   • History of 2019 novel coronavirus disease (COVID-19) 2021   • BMI 26.0-26.9,adult 2021   • Mild episode of recurrent major depressive disorder (HCC) 2021   • History of Gómez's esophagus 2021   • Abnormal ultrasound of liver 2021   • Bilateral radicular pain 2021   • Muscle spasm 2021   • Mixed  "hyperlipidemia 01/20/2021   • Elevated liver enzymes 01/20/2021   • Vitamin D deficiency 01/20/2021   • Intervertebral cervical disc disorder with myelopathy, cervical region 11/29/2017   • Transaminitis 12/19/2016   • Hyperglobulinemia 12/19/2016   • Osteoporosis 12/18/2016   • Weakness 07/31/2015   • Stress-induced cardiomyopathy 09/30/2014   • Anxiety 09/29/2014   • Microscopic colitis 09/28/2014   • Essential hypertension 09/26/2014   • Insomnia 09/26/2014   • Gastritis 09/25/2014       Current medicines (including changes today)  Current Outpatient Medications   Medication Sig Dispense Refill   • ALPRAZolam (XANAX) 0.5 MG Tab Take 1 Tablet by mouth 2 times a day as needed for Sleep or Anxiety for up to 30 days. 60 tablets is a 30 day quantity 60 Tablet 0   • pantoprazole (PROTONIX) 40 MG Tablet Delayed Response Take 40 mg by mouth every day.     • gabapentin (NEURONTIN) 300 MG Cap Take 1 capsule by mouth 3 times a day. 270 capsule 2   • aluminum chloride (DRYSOL) 20 % external solution Apply to axillary regions nightly (Patient taking differently: at bedtime as needed. Apply to axillary regions nightly) 60 mL 4   • enalapril (VASOTEC) 20 MG tablet Take 1 tablet by mouth 2 times a day. Indications: High Blood Pressure Disorder 200 tablet 3   • Cholecalciferol (VITAMIN D3) 2000 UNIT Cap Take  by mouth every day.       No current facility-administered medications for this visit.       Allergies   Allergen Reactions   • Codeine Vomiting     [id number]   • Cefdinir      Rash       ROS: As per HPI       Objective:     /84 (BP Location: Right arm, Patient Position: Sitting, BP Cuff Size: Adult)   Pulse 75   Temp 36.6 °C (97.8 °F) (Temporal)   Ht 1.562 m (5' 1.5\")   Wt 63.6 kg (140 lb 3.2 oz)   SpO2 97%  Body mass index is 26.06 kg/m².    Physical Exam:  Constitutional: Well-developed and well-nourished. Not diaphoretic. No distress. Lucid and fluent.  Patient, daughter, physician and staff all wearing " masks.  Skin: Skin is warm and dry. No rash noted.  Head: Atraumatic without lesions.  Eyes: Conjunctivae and extraocular motions are normal. Pupils are equal, round, and reactive to light. No scleral icterus.   Ears:  External ears unremarkable.   Neck: Supple, trachea midline. No thyromegaly present. No cervical or supraclavicular lymphadenopathy. No JVD or carotid bruits appreciated  Cardiovascular: Regular rate and rhythm.  Normal S1, S2 without murmur appreciated.  Chest: Effort normal. Clear to auscultation throughout. No adventitious sounds.   Abdomen: Soft, there are nodules in the left upper abdomen a few centimeters inferior to the breast and lateral over the ribs.  The nodules are somewhat mobile and quite tender.  They are little firm but do not feel hard.  Extremities: No cyanosis, clubbing, erythema, nor edema.   Neurological: Alert and oriented x 3. No tremor.  Psychiatric:  Behavior, mood, and affect are appropriate.       Assessment and Plan:     71 y.o. female with the following issues:    1. Muscle tenderness     2. Myofascial pain on left side  [id number] (W/O CXR) LEFT   3. Rib pain on left side  [id number] (W/O CXR) LEFT   4. Need for immunization against influenza  Influenza Vaccine, High Dose (65+ Only)   5. Situational anxiety  ALPRAZolam (XANAX) 0.5 MG Tab         Followup: Return in about 6 months (around 5/30/2022), or if symptoms worsen or fail to improve.

## 2021-12-06 ENCOUNTER — APPOINTMENT (OUTPATIENT)
Dept: RADIOLOGY | Facility: MEDICAL CENTER | Age: 71
End: 2021-12-06
Attending: FAMILY MEDICINE
Payer: MEDICARE

## 2021-12-17 ENCOUNTER — HOSPITAL ENCOUNTER (OUTPATIENT)
Dept: RADIOLOGY | Facility: MEDICAL CENTER | Age: 71
End: 2021-12-17
Attending: FAMILY MEDICINE
Payer: MEDICARE

## 2021-12-17 VITALS — DIASTOLIC BLOOD PRESSURE: 89 MMHG | SYSTOLIC BLOOD PRESSURE: 140 MMHG

## 2021-12-17 DIAGNOSIS — M79.18 MYOFASCIAL PAIN ON LEFT SIDE: ICD-10-CM

## 2021-12-17 DIAGNOSIS — R07.81 RIB PAIN ON LEFT SIDE: ICD-10-CM

## 2021-12-17 PROCEDURE — 71101 X-RAY EXAM UNILAT RIBS/CHEST: CPT | Mod: LT

## 2021-12-27 VITALS — DIASTOLIC BLOOD PRESSURE: 66 MMHG | SYSTOLIC BLOOD PRESSURE: 115 MMHG

## 2022-01-11 ENCOUNTER — TELEPHONE (OUTPATIENT)
Dept: MEDICAL GROUP | Facility: MEDICAL CENTER | Age: 72
End: 2022-01-11

## 2022-01-11 DIAGNOSIS — J02.9 SORE THROAT: ICD-10-CM

## 2022-01-11 DIAGNOSIS — J01.90 ACUTE NON-RECURRENT SINUSITIS, UNSPECIFIED LOCATION: ICD-10-CM

## 2022-01-11 NOTE — TELEPHONE ENCOUNTER
"Patient called stating that she has had a sore throat for 4 days and it's in her \"sinuses and chest.\" Patient requesting antibiotics.  "

## 2022-01-13 RX ORDER — AZITHROMYCIN 250 MG/1
TABLET, FILM COATED ORAL
Qty: 6 TABLET | Refills: 0 | Status: SHIPPED | OUTPATIENT
Start: 2022-01-13 | End: 2022-03-01

## 2022-01-13 NOTE — TELEPHONE ENCOUNTER
I have gone ahead and sent a Z-Wilfredo to the pharmacy.  I really do not want her coming in or going into urgent care right now.  Our COVID numbers are crazy beyond relief.

## 2022-01-24 DIAGNOSIS — E78.2 MIXED HYPERLIPIDEMIA: ICD-10-CM

## 2022-01-24 DIAGNOSIS — R74.8 ELEVATED LIVER ENZYMES: ICD-10-CM

## 2022-01-24 DIAGNOSIS — I10 ESSENTIAL HYPERTENSION: ICD-10-CM

## 2022-01-24 DIAGNOSIS — K29.30 CHRONIC SUPERFICIAL GASTRITIS WITHOUT BLEEDING: ICD-10-CM

## 2022-01-24 DIAGNOSIS — E55.9 VITAMIN D DEFICIENCY: ICD-10-CM

## 2022-01-26 DIAGNOSIS — M79.10 MYALGIA: ICD-10-CM

## 2022-01-26 DIAGNOSIS — R74.8 ELEVATED LIVER ENZYMES: ICD-10-CM

## 2022-02-16 ENCOUNTER — HOSPITAL ENCOUNTER (OUTPATIENT)
Dept: LAB | Facility: MEDICAL CENTER | Age: 72
End: 2022-02-16
Attending: NURSE PRACTITIONER
Payer: MEDICARE

## 2022-02-16 ENCOUNTER — HOSPITAL ENCOUNTER (OUTPATIENT)
Dept: LAB | Facility: MEDICAL CENTER | Age: 72
End: 2022-02-16
Attending: INTERNAL MEDICINE
Payer: MEDICARE

## 2022-02-16 ENCOUNTER — HOSPITAL ENCOUNTER (OUTPATIENT)
Dept: LAB | Facility: MEDICAL CENTER | Age: 72
End: 2022-02-16
Attending: FAMILY MEDICINE
Payer: MEDICARE

## 2022-02-16 DIAGNOSIS — E78.2 MIXED HYPERLIPIDEMIA: ICD-10-CM

## 2022-02-16 DIAGNOSIS — R74.8 ELEVATED LIVER ENZYMES: ICD-10-CM

## 2022-02-16 DIAGNOSIS — I10 ESSENTIAL HYPERTENSION: ICD-10-CM

## 2022-02-16 DIAGNOSIS — Z01.812 PRE-OPERATIVE LABORATORY EXAMINATION: ICD-10-CM

## 2022-02-16 DIAGNOSIS — E55.9 VITAMIN D DEFICIENCY: ICD-10-CM

## 2022-02-16 DIAGNOSIS — K29.30 CHRONIC SUPERFICIAL GASTRITIS WITHOUT BLEEDING: ICD-10-CM

## 2022-02-16 DIAGNOSIS — M79.10 MYALGIA: ICD-10-CM

## 2022-02-16 LAB
25(OH)D3 SERPL-MCNC: 47 NG/ML (ref 30–100)
ALBUMIN SERPL BCP-MCNC: 5.2 G/DL (ref 3.2–4.9)
ALBUMIN SERPL BCP-MCNC: 5.2 G/DL (ref 3.2–4.9)
ALBUMIN/GLOB SERPL: 1.3 G/DL
ALBUMIN/GLOB SERPL: 1.4 G/DL
ALP SERPL-CCNC: 81 U/L (ref 30–99)
ALP SERPL-CCNC: 81 U/L (ref 30–99)
ALT SERPL-CCNC: 14 U/L (ref 2–50)
ALT SERPL-CCNC: 15 U/L (ref 2–50)
ANION GAP SERPL CALC-SCNC: 12 MMOL/L (ref 7–16)
ANION GAP SERPL CALC-SCNC: 12 MMOL/L (ref 7–16)
AST SERPL-CCNC: 22 U/L (ref 12–45)
AST SERPL-CCNC: 29 U/L (ref 12–45)
BASOPHILS # BLD AUTO: 1.7 % (ref 0–1.8)
BASOPHILS # BLD: 0.1 K/UL (ref 0–0.12)
BILIRUB SERPL-MCNC: 0.5 MG/DL (ref 0.1–1.5)
BILIRUB SERPL-MCNC: 0.5 MG/DL (ref 0.1–1.5)
BUN SERPL-MCNC: 10 MG/DL (ref 8–22)
BUN SERPL-MCNC: 10 MG/DL (ref 8–22)
CALCIUM SERPL-MCNC: 10.3 MG/DL (ref 8.5–10.5)
CALCIUM SERPL-MCNC: 10.4 MG/DL (ref 8.5–10.5)
CHLORIDE SERPL-SCNC: 102 MMOL/L (ref 96–112)
CHLORIDE SERPL-SCNC: 102 MMOL/L (ref 96–112)
CHOLEST SERPL-MCNC: 279 MG/DL (ref 100–199)
CO2 SERPL-SCNC: 24 MMOL/L (ref 20–33)
CO2 SERPL-SCNC: 24 MMOL/L (ref 20–33)
CREAT SERPL-MCNC: 0.74 MG/DL (ref 0.5–1.4)
CREAT SERPL-MCNC: 0.76 MG/DL (ref 0.5–1.4)
EOSINOPHIL # BLD AUTO: 0.18 K/UL (ref 0–0.51)
EOSINOPHIL NFR BLD: 3 % (ref 0–6.9)
ERYTHROCYTE [DISTWIDTH] IN BLOOD BY AUTOMATED COUNT: 46.9 FL (ref 35.9–50)
ERYTHROCYTE [DISTWIDTH] IN BLOOD BY AUTOMATED COUNT: 47 FL (ref 35.9–50)
ERYTHROCYTE [DISTWIDTH] IN BLOOD BY AUTOMATED COUNT: 47.6 FL (ref 35.9–50)
GLOBULIN SER CALC-MCNC: 3.8 G/DL (ref 1.9–3.5)
GLOBULIN SER CALC-MCNC: 3.9 G/DL (ref 1.9–3.5)
GLUCOSE SERPL-MCNC: 101 MG/DL (ref 65–99)
GLUCOSE SERPL-MCNC: 102 MG/DL (ref 65–99)
HCT VFR BLD AUTO: 50.5 % (ref 37–47)
HCT VFR BLD AUTO: 50.7 % (ref 37–47)
HCT VFR BLD AUTO: 52.3 % (ref 37–47)
HDLC SERPL-MCNC: 76 MG/DL
HGB BLD-MCNC: 17.2 G/DL (ref 12–16)
HGB BLD-MCNC: 17.3 G/DL (ref 12–16)
HGB BLD-MCNC: 17.6 G/DL (ref 12–16)
IMM GRANULOCYTES # BLD AUTO: 0.01 K/UL (ref 0–0.11)
IMM GRANULOCYTES NFR BLD AUTO: 0.2 % (ref 0–0.9)
INR PPP: 0.94 (ref 0.87–1.13)
INR PPP: 0.95 (ref 0.87–1.13)
LDLC SERPL CALC-MCNC: 162 MG/DL
LYMPHOCYTES # BLD AUTO: 2.06 K/UL (ref 1–4.8)
LYMPHOCYTES NFR BLD: 34.1 % (ref 22–41)
MCH RBC QN AUTO: 31.3 PG (ref 27–33)
MCH RBC QN AUTO: 31.7 PG (ref 27–33)
MCH RBC QN AUTO: 31.7 PG (ref 27–33)
MCHC RBC AUTO-ENTMCNC: 33.7 G/DL (ref 33.6–35)
MCHC RBC AUTO-ENTMCNC: 34.1 G/DL (ref 33.6–35)
MCHC RBC AUTO-ENTMCNC: 34.1 G/DL (ref 33.6–35)
MCV RBC AUTO: 92.9 FL (ref 81.4–97.8)
MCV RBC AUTO: 93 FL (ref 81.4–97.8)
MCV RBC AUTO: 93.2 FL (ref 81.4–97.8)
MONOCYTES # BLD AUTO: 0.42 K/UL (ref 0–0.85)
MONOCYTES NFR BLD AUTO: 7 % (ref 0–13.4)
NEUTROPHILS # BLD AUTO: 3.27 K/UL (ref 2–7.15)
NEUTROPHILS NFR BLD: 54 % (ref 44–72)
NRBC # BLD AUTO: 0 K/UL
NRBC BLD-RTO: 0 /100 WBC
PLATELET # BLD AUTO: 207 K/UL (ref 164–446)
PLATELET # BLD AUTO: 208 K/UL (ref 164–446)
PLATELET # BLD AUTO: 212 K/UL (ref 164–446)
PMV BLD AUTO: 10.8 FL (ref 9–12.9)
PMV BLD AUTO: 10.9 FL (ref 9–12.9)
PMV BLD AUTO: 11 FL (ref 9–12.9)
POTASSIUM SERPL-SCNC: 4.2 MMOL/L (ref 3.6–5.5)
POTASSIUM SERPL-SCNC: 4.3 MMOL/L (ref 3.6–5.5)
PROT SERPL-MCNC: 9 G/DL (ref 6–8.2)
PROT SERPL-MCNC: 9.1 G/DL (ref 6–8.2)
PROTHROMBIN TIME: 12.3 SEC (ref 12–14.6)
PROTHROMBIN TIME: 12.4 SEC (ref 12–14.6)
QORDR QORDR: NORMAL
RBC # BLD AUTO: 5.42 M/UL (ref 4.2–5.4)
RBC # BLD AUTO: 5.45 M/UL (ref 4.2–5.4)
RBC # BLD AUTO: 5.63 M/UL (ref 4.2–5.4)
SODIUM SERPL-SCNC: 138 MMOL/L (ref 135–145)
SODIUM SERPL-SCNC: 138 MMOL/L (ref 135–145)
TRIGL SERPL-MCNC: 206 MG/DL (ref 0–149)
TSH SERPL DL<=0.005 MIU/L-ACNC: 0.87 UIU/ML (ref 0.38–5.33)
WBC # BLD AUTO: 6 K/UL (ref 4.8–10.8)
WBC # BLD AUTO: 6.1 K/UL (ref 4.8–10.8)
WBC # BLD AUTO: 6.2 K/UL (ref 4.8–10.8)

## 2022-02-16 PROCEDURE — 80053 COMPREHEN METABOLIC PANEL: CPT | Mod: 91

## 2022-02-16 PROCEDURE — 85027 COMPLETE CBC AUTOMATED: CPT | Mod: 91

## 2022-02-16 PROCEDURE — 85027 COMPLETE CBC AUTOMATED: CPT

## 2022-02-16 PROCEDURE — 85610 PROTHROMBIN TIME: CPT | Mod: 91

## 2022-02-16 PROCEDURE — 85025 COMPLETE CBC W/AUTO DIFF WBC: CPT

## 2022-02-16 PROCEDURE — 86664 EPSTEIN-BARR NUCLEAR ANTIGEN: CPT

## 2022-02-16 PROCEDURE — 36415 COLL VENOUS BLD VENIPUNCTURE: CPT

## 2022-02-16 PROCEDURE — 86665 EPSTEIN-BARR CAPSID VCA: CPT

## 2022-02-16 PROCEDURE — 85610 PROTHROMBIN TIME: CPT

## 2022-02-16 PROCEDURE — 86663 EPSTEIN-BARR ANTIBODY: CPT

## 2022-02-16 PROCEDURE — 84443 ASSAY THYROID STIM HORMONE: CPT

## 2022-02-16 PROCEDURE — 80061 LIPID PANEL: CPT

## 2022-02-16 PROCEDURE — 80053 COMPREHEN METABOLIC PANEL: CPT

## 2022-02-16 PROCEDURE — 82105 ALPHA-FETOPROTEIN SERUM: CPT

## 2022-02-16 PROCEDURE — 82306 VITAMIN D 25 HYDROXY: CPT

## 2022-02-18 LAB
AFP-TM SERPL-MCNC: 3 NG/ML (ref 0–9)
EBV EA-D IGG SER-ACNC: <5 U/ML (ref 0–10.9)
EBV NA IGG SER IA-ACNC: >600 U/ML (ref 0–21.9)
EBV VCA IGG SER IA-ACNC: >750 U/ML (ref 0–21.9)

## 2022-02-25 ENCOUNTER — HOSPITAL ENCOUNTER (OUTPATIENT)
Dept: RADIOLOGY | Facility: MEDICAL CENTER | Age: 72
End: 2022-02-25
Attending: INTERNAL MEDICINE
Payer: MEDICARE

## 2022-02-25 DIAGNOSIS — R53.83 OTHER FATIGUE: ICD-10-CM

## 2022-02-25 DIAGNOSIS — R13.19 CONSTANT LOW-GRADE DYSPHAGIA: ICD-10-CM

## 2022-02-25 DIAGNOSIS — K22.719 BARRETT'S ESOPHAGUS WITH DYSPLASIA: ICD-10-CM

## 2022-02-25 DIAGNOSIS — K74.69 FLORID CIRRHOSIS (HCC): ICD-10-CM

## 2022-02-25 PROCEDURE — 76705 ECHO EXAM OF ABDOMEN: CPT

## 2022-03-01 ENCOUNTER — OFFICE VISIT (OUTPATIENT)
Dept: MEDICAL GROUP | Facility: MEDICAL CENTER | Age: 72
End: 2022-03-01
Payer: MEDICARE

## 2022-03-01 VITALS
OXYGEN SATURATION: 96 % | BODY MASS INDEX: 25.76 KG/M2 | SYSTOLIC BLOOD PRESSURE: 164 MMHG | DIASTOLIC BLOOD PRESSURE: 96 MMHG | HEART RATE: 74 BPM | TEMPERATURE: 98.1 F | HEIGHT: 62 IN | WEIGHT: 140 LBS

## 2022-03-01 DIAGNOSIS — M79.10 MYALGIA: ICD-10-CM

## 2022-03-01 DIAGNOSIS — K74.60 CIRRHOSIS OF LIVER WITHOUT ASCITES, UNSPECIFIED HEPATIC CIRRHOSIS TYPE (HCC): ICD-10-CM

## 2022-03-01 DIAGNOSIS — F33.0 MILD EPISODE OF RECURRENT MAJOR DEPRESSIVE DISORDER (HCC): ICD-10-CM

## 2022-03-01 DIAGNOSIS — F51.04 PSYCHOPHYSIOLOGIC INSOMNIA: ICD-10-CM

## 2022-03-01 DIAGNOSIS — I10 ESSENTIAL HYPERTENSION: ICD-10-CM

## 2022-03-01 DIAGNOSIS — F41.8 SITUATIONAL ANXIETY: ICD-10-CM

## 2022-03-01 DIAGNOSIS — I70.0 AORTIC ATHEROSCLEROSIS (HCC): ICD-10-CM

## 2022-03-01 DIAGNOSIS — Z12.31 ENCOUNTER FOR SCREENING MAMMOGRAM FOR BREAST CANCER: ICD-10-CM

## 2022-03-01 DIAGNOSIS — I77.9 DISORDER OF ARTERIES AND ARTERIOLES, UNSPECIFIED (HCC): ICD-10-CM

## 2022-03-01 PROBLEM — R07.81 RIB PAIN ON LEFT SIDE: Status: RESOLVED | Noted: 2021-11-30 | Resolved: 2022-03-01

## 2022-03-01 PROBLEM — R74.8 ELEVATED LIVER ENZYMES: Status: RESOLVED | Noted: 2021-01-20 | Resolved: 2022-03-01

## 2022-03-01 PROCEDURE — 99214 OFFICE O/P EST MOD 30 MIN: CPT | Performed by: FAMILY MEDICINE

## 2022-03-01 RX ORDER — LOSARTAN POTASSIUM 100 MG/1
100 TABLET ORAL DAILY
Qty: 90 TABLET | Refills: 3 | Status: SHIPPED | OUTPATIENT
Start: 2022-03-01 | End: 2022-10-30

## 2022-03-01 RX ORDER — CELECOXIB 200 MG/1
200 CAPSULE ORAL DAILY
Qty: 90 CAPSULE | Refills: 2 | Status: SHIPPED | OUTPATIENT
Start: 2022-03-01 | End: 2022-04-20

## 2022-03-01 RX ORDER — ALPRAZOLAM 0.5 MG/1
0.5 TABLET ORAL 2 TIMES DAILY PRN
Qty: 60 TABLET | Refills: 2 | Status: SHIPPED | OUTPATIENT
Start: 2022-03-01 | End: 2022-05-16 | Stop reason: SDUPTHER

## 2022-03-01 ASSESSMENT — FIBROSIS 4 INDEX: FIB4 SCORE: 1.893458524812514922
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
## 2022-03-02 ENCOUNTER — TELEPHONE (OUTPATIENT)
Dept: MEDICAL GROUP | Facility: MEDICAL CENTER | Age: 72
End: 2022-03-02
Payer: MEDICARE

## 2022-03-02 DIAGNOSIS — E78.2 MIXED HYPERLIPIDEMIA: ICD-10-CM

## 2022-03-02 RX ORDER — ATORVASTATIN CALCIUM 20 MG/1
20 TABLET, FILM COATED ORAL
Qty: 90 TABLET | Refills: 3 | Status: SHIPPED | OUTPATIENT
Start: 2022-03-02 | End: 2022-05-31 | Stop reason: SDUPTHER

## 2022-03-03 NOTE — TELEPHONE ENCOUNTER
Yes, I would like her to resume her cholesterol medication.  I believe we had started to discuss that at the visit and then I got sidetracked.  I have now sent the atorvastatin order to her [facility].

## 2022-03-03 NOTE — TELEPHONE ENCOUNTER
Pt called and LVM stated she is missing her blood pressure and cholesterol medication.    Called pt to ask her if she grabbed her Losartan for her blood pressure. Stated the pharmacy didn't give her the losartan, said they didn't have it. Called Pharmacy they do have it and they will fill it now.    For the Cholesterol medication, she wasn't sure if you wanted her to prescribed this or not, please advise.

## 2022-03-04 ENCOUNTER — TELEPHONE (OUTPATIENT)
Dept: MEDICAL GROUP | Facility: MEDICAL CENTER | Age: 72
End: 2022-03-04
Payer: MEDICARE

## 2022-03-04 NOTE — TELEPHONE ENCOUNTER
----- Message from Mandy Cordero, Med Ass't sent at 3/3/2022  8:42 AM PST -----  Regarding: MEDICATION HOLD  Good morning,     I was able to schedule the patient for her liver biopsy for 3/25 @ 10 am. The patient did mention that she is on Celebrex 200 mg QD and that she would need to hold it per her instruction from you.     Would you able to place a blood thinner clearance in her chart for that medication?    Thank you,     Mandy  EXT [phone]

## 2022-03-04 NOTE — TELEPHONE ENCOUNTER
Patient has a liver biopsy scheduled for March 25.  She is to hold the celecoxib 200 mg daily medication for 5 days prior to her liver biopsy.

## 2022-03-15 ENCOUNTER — PRE-ADMISSION TESTING (OUTPATIENT)
Dept: ADMISSIONS | Facility: MEDICAL CENTER | Age: 72
End: 2022-03-15
Attending: INTERNAL MEDICINE
Payer: MEDICARE

## 2022-03-15 RX ORDER — MULTIVIT WITH MINERALS/LUTEIN
2000 TABLET ORAL DAILY
COMMUNITY

## 2022-03-15 RX ORDER — MULTIVITAMIN
TABLET ORAL DAILY
COMMUNITY

## 2022-03-25 ENCOUNTER — APPOINTMENT (OUTPATIENT)
Dept: RADIOLOGY | Facility: MEDICAL CENTER | Age: 72
End: 2022-03-25
Attending: INTERNAL MEDICINE
Payer: MEDICARE

## 2022-03-25 ENCOUNTER — HOSPITAL ENCOUNTER (OUTPATIENT)
Facility: MEDICAL CENTER | Age: 72
End: 2022-03-25
Attending: INTERNAL MEDICINE | Admitting: STUDENT IN AN ORGANIZED HEALTH CARE EDUCATION/TRAINING PROGRAM
Payer: MEDICARE

## 2022-03-25 ENCOUNTER — HOSPITAL ENCOUNTER (EMERGENCY)
Facility: MEDICAL CENTER | Age: 72
End: 2022-03-25
Payer: MEDICARE

## 2022-03-25 VITALS
DIASTOLIC BLOOD PRESSURE: 93 MMHG | SYSTOLIC BLOOD PRESSURE: 203 MMHG | HEART RATE: 80 BPM | WEIGHT: 140 LBS | OXYGEN SATURATION: 98 % | RESPIRATION RATE: 17 BRPM | HEIGHT: 62 IN | BODY MASS INDEX: 25.76 KG/M2

## 2022-03-25 VITALS
BODY MASS INDEX: 25.76 KG/M2 | WEIGHT: 140 LBS | SYSTOLIC BLOOD PRESSURE: 167 MMHG | OXYGEN SATURATION: 95 % | HEIGHT: 62 IN | RESPIRATION RATE: 16 BRPM | HEART RATE: 81 BPM | DIASTOLIC BLOOD PRESSURE: 92 MMHG | TEMPERATURE: 97.6 F

## 2022-03-25 DIAGNOSIS — R94.5 LIVER FUNCTION STUDY, ABNORMAL: ICD-10-CM

## 2022-03-25 PROCEDURE — 302449 STATCHG TRIAGE ONLY (STATISTIC)

## 2022-03-25 ASSESSMENT — FIBROSIS 4 INDEX
FIB4 SCORE: 1.893458524812514922
FIB4 SCORE: 1.893458524812514922

## 2022-03-25 NOTE — ED TRIAGE NOTES
"Chief Complaint   Patient presents with   • Blood Pressure Problem     Pt was giving to get a liver biopsy today and her BP was in the 200's. Pt does have hx of HTN and takes losartan          Pt walk in for above. Pt states she has had a HA for 24 hours. Pt does not take her BP at home. Pt aox4, GCS 15.      BP (!) 180/97   Pulse 81   Temp 36.4 °C (97.6 °F) (Temporal)   Resp 16   Ht 1.575 m (5' 2\")   Wt 63.5 kg (140 lb)   SpO2 95%   BMI 25.61 kg/m²     "

## 2022-03-25 NOTE — PROGRESS NOTES
PT presents to OP IR for liver bx. Pt states she took her BP meds PTA. Pt ambulated to room 1. BP taken systolic 195. Dr. Espinal made aware. Recheck /93. Pt complains of headache and SOB. Procedure cancelled . Pt to be transported to ER for further care.

## 2022-04-04 ENCOUNTER — OFFICE VISIT (OUTPATIENT)
Dept: MEDICAL GROUP | Facility: MEDICAL CENTER | Age: 72
End: 2022-04-04
Payer: MEDICARE

## 2022-04-04 VITALS
RESPIRATION RATE: 16 BRPM | DIASTOLIC BLOOD PRESSURE: 84 MMHG | WEIGHT: 140 LBS | BODY MASS INDEX: 25.76 KG/M2 | HEART RATE: 85 BPM | SYSTOLIC BLOOD PRESSURE: 132 MMHG | TEMPERATURE: 97.7 F | HEIGHT: 62 IN | OXYGEN SATURATION: 97 %

## 2022-04-04 DIAGNOSIS — K29.30 CHRONIC SUPERFICIAL GASTRITIS WITHOUT BLEEDING: ICD-10-CM

## 2022-04-04 DIAGNOSIS — I10 ESSENTIAL HYPERTENSION: ICD-10-CM

## 2022-04-04 DIAGNOSIS — M62.838 MUSCLE SPASM: ICD-10-CM

## 2022-04-04 PROCEDURE — 99213 OFFICE O/P EST LOW 20 MIN: CPT | Performed by: STUDENT IN AN ORGANIZED HEALTH CARE EDUCATION/TRAINING PROGRAM

## 2022-04-04 RX ORDER — AMLODIPINE BESYLATE 5 MG/1
5 TABLET ORAL DAILY
Qty: 30 TABLET | Refills: 11 | Status: SHIPPED | OUTPATIENT
Start: 2022-04-04 | End: 2022-04-20

## 2022-04-04 RX ORDER — PANTOPRAZOLE SODIUM 40 MG/1
40 TABLET, DELAYED RELEASE ORAL 2 TIMES DAILY
Qty: 60 TABLET | Refills: 2 | Status: CANCELLED | OUTPATIENT
Start: 2022-04-04

## 2022-04-04 ASSESSMENT — FIBROSIS 4 INDEX: FIB4 SCORE: 1.893458524812514922

## 2022-04-04 ASSESSMENT — PATIENT HEALTH QUESTIONNAIRE - PHQ9: CLINICAL INTERPRETATION OF PHQ2 SCORE: 0

## 2022-04-04 NOTE — PROGRESS NOTES
"Subjective:     CC: Hypertension, pain    HPI:   Karen presents today to follow-up on blood pressure concerns.    Hypertension  Patient seen in office 3/1/2022 for uncontrolled hypertension.  Patient had previously been on enalapril and was switched to losartan 100 mg.  Patient notes that she is tolerating the medication well but blood pressure continues to fluctuate into the 160s systolic.  Patient denies lightheadedness, tunnel vision, unusual fatigue, headaches, palpitations.  Patient notes that she was supposed to get liver biopsy 3/25 but was unable to get the procedure due to elevated blood pressure.    Patient believes the fluctuations in her blood pressure are secondary to severe pain.  Patient notes pain from the top of her head to the bottom of her feet.  Patient describes the pain as her ribs ripping apart on a burning sensation worse in her abdomen but states pain all over.  Was started on Celebrex at last visit with PCP.  She notes no relief with Celebrex.  Patient does note relief in pain with Xanax but is unable to take this regularly due to caring for 2 children and needing to drive throughout the day.    Patient getting liver biopsy for cirrhosis.  Patient is following with GI.  Patient is on pantoprazole for acid reflux.  Patient scheduled for EGD May 25.    Patient's pain may be caused by esophagitis/gastritis, cervical disc disorder, elevated JARRETT, anxiety.  We will start by treating blood pressure and continue to evaluate causes of pain.    ROS:  Gen: no fevers/chills  Pulm: no sob, no cough  CV: no chest pain, no palpitations  GI: no nausea/vomiting, no diarrhea  Neuro: no headaches    Objective:     Exam:  /84 (BP Location: Right arm, Patient Position: Sitting, BP Cuff Size: Adult)   Pulse 85   Temp 36.5 °C (97.7 °F) (Temporal)   Resp 16   Ht 1.575 m (5' 2\")   Wt 63.5 kg (140 lb)   SpO2 97%   BMI 25.61 kg/m²  Body mass index is 25.61 kg/m².    Gen: Alert and oriented, No apparent " distress.  Neck: Neck is supple without lymphadenopathy.  Lungs: Normal effort, CTA bilaterally, no wheezes, rhonchi, or rales  CV: Regular rate and rhythm. No murmurs, rubs, or gallops.  Ext: No clubbing, cyanosis, edema.      Assessment & Plan:     71 y.o. female with the following -     1. Essential hypertension  Chronic, uncontrolled.  Patient continues on losartan 100 mg.  Patient continues to note fluctuations in blood pressure.  He was unable to get liver biopsy secondary to elevated blood pressure.  We will add amlodipine 5 mg and continue to monitor blood pressure.  - amLODIPine (NORVASC) 5 MG Tab; Take 1 Tablet by mouth every day.  Dispense: 30 Tablet; Refill: 11    2. Chronic superficial gastritis without bleeding  Chronic, stable.  Patient continues to follow with GI.    3. Muscle spasm  Chronic, uncontrolled.  Patient believes elevations in blood pressure secondary to uncontrolled pain.  Patient notes relief in pain with Xanax.  Pain possibly secondary to anxiety.    No follow-ups on file.    Please note that this dictation was created using voice recognition software. I have made every reasonable attempt to correct obvious errors, but I expect that there are errors of grammar and possibly content that I did not discover before finalizing the note.

## 2022-04-19 ENCOUNTER — TELEPHONE (OUTPATIENT)
Dept: MEDICAL GROUP | Facility: MEDICAL CENTER | Age: 72
End: 2022-04-19
Payer: MEDICARE

## 2022-04-19 NOTE — TELEPHONE ENCOUNTER
ESTABLISHED PATIENT PRE-VISIT PLANNING     Phone Number Called: [phone] (home)   Call outcome: Spoke to patient regarding message below.  Message: Appointment scheduled with , Wednesday, 4/20/2022 at 9:30 AM, [address], [id number]. Arrive 10-15 minutes early for check-in.    Patient was contacted to complete PVP.     Note: Patient will not be contacted if there is no indication to call.     1.  Reviewed notes from the last few office visits within the medical group: Yes    2.  If any orders were placed at last visit or intended to be done for this visit (i.e. 6 mos follow-up), do we have Results/Consult Notes?         •  Labs - Labs ordered, completed on 02/16/2022 and results are in chart.  Note: If patient appointment is for lab review and patient did not complete labs, check with provider if OK to reschedule patient until labs completed.       •  Imaging - Imaging ordered, NOT completed. Patient advised to complete prior to next appointment.    -MAMMO: Not Done. During Pre-Visit Planning call, patient stated she'll schedule at a later time.         •  Referrals - No referrals were ordered at last office visit.    3. Is this appointment scheduled as a Hospital Follow-Up? No    4.  Immunizations were updated in Epic using Reconcile Outside Information activity? Yes    5.  Patient is due for the following Health Maintenance Topics:   Health Maintenance Due   Topic Date Due   • IMM HEP B VACCINE (1 of 3 - Risk 3-dose series) Never done   • COVID-19 Vaccine (3 - Booster for Pfizer series) 09/16/2021   • MAMMOGRAM  02/19/2022       6.  AHA (Pulse8) form printed for Provider? N/A, Compliant

## 2022-04-20 ENCOUNTER — OFFICE VISIT (OUTPATIENT)
Dept: MEDICAL GROUP | Facility: MEDICAL CENTER | Age: 72
End: 2022-04-20
Payer: MEDICARE

## 2022-04-20 VITALS
OXYGEN SATURATION: 96 % | BODY MASS INDEX: 25.95 KG/M2 | DIASTOLIC BLOOD PRESSURE: 74 MMHG | HEIGHT: 62 IN | WEIGHT: 141 LBS | TEMPERATURE: 97.7 F | SYSTOLIC BLOOD PRESSURE: 142 MMHG | HEART RATE: 82 BPM

## 2022-04-20 DIAGNOSIS — R10.10 PAIN OF UPPER ABDOMEN: ICD-10-CM

## 2022-04-20 DIAGNOSIS — R76.8 ANA POSITIVE: ICD-10-CM

## 2022-04-20 DIAGNOSIS — M35.9 AUTOIMMUNE DISEASE (HCC): ICD-10-CM

## 2022-04-20 DIAGNOSIS — I10 ESSENTIAL HYPERTENSION: ICD-10-CM

## 2022-04-20 DIAGNOSIS — R70.0 ELEVATED SEDIMENTATION RATE: ICD-10-CM

## 2022-04-20 DIAGNOSIS — F41.8 SITUATIONAL ANXIETY: ICD-10-CM

## 2022-04-20 PROCEDURE — 99213 OFFICE O/P EST LOW 20 MIN: CPT | Performed by: FAMILY MEDICINE

## 2022-04-20 RX ORDER — FAMOTIDINE 20 MG/1
20 TABLET, FILM COATED ORAL 2 TIMES DAILY
Qty: 180 TABLET | Refills: 2 | Status: SHIPPED | OUTPATIENT
Start: 2022-04-20 | End: 2023-01-16

## 2022-04-20 RX ORDER — AMLODIPINE BESYLATE 5 MG/1
5 TABLET ORAL DAILY
Qty: 90 TABLET | Refills: 3 | Status: SHIPPED | OUTPATIENT
Start: 2022-04-20 | End: 2023-04-19

## 2022-04-20 ASSESSMENT — FIBROSIS 4 INDEX: FIB4 SCORE: 1.893458524812514922

## 2022-04-20 NOTE — PROGRESS NOTES
Patient: Nas Iglesias  : 1945  MRN:   [id number]   Date: 12/15/2017 7:11 AM  Attending: Eugene Davidson MD  Financial : [id number]         72-year-old male with a personal history of Anthony's esophagus here for a follow-up surveillance EGD.        Past Medical History:   Diagnosis Date   • Anxiety    • Arthritis    • Anthony's esophagus    • Colon polyps    • Depression    • Esophageal reflux    • Lumbar radiculopathy    • Sinusitis, chronic    • Unspecified essential hypertension          No current facility-administered medications on file prior to encounter.   Current Outpatient Prescriptions on File Prior to Encounter:  traZODone (DESYREL) 50 MG tablet   lansoprazole (PREVACID) 30 MG capsule   olmesartan-hydrochlorothiazide (BENICAR HCT) 20-12.5 MG per tablet   Cholecalciferol (VITAMIN D3) 3000 UNITS Tab   Glucosamine-Chondroit-Vit C-Mn (GLUCOSAMINE CHONDR 1500 COMPLX) tablet   sildenafil (REVATIO) 20 MG tablet   Daily Multiple Vitamins TABS   aspirin 81 MG tablet   LORazepam (ATIVAN) 0.5 MG tablet       ALLERGIES:   Allergen Reactions   • Codeine NAUSEA   • Celebrex [Celecoxib] HIVES   • Latex RASH       Social History     Social History   • Marital status:      Spouse name: N/A   • Number of children: N/A   • Years of education: N/A     Occupational History   • Not on file.     Social History Main Topics   • Smoking status: Former Smoker     Quit date: 1975   • Smokeless tobacco: Never Used   • Alcohol use 1.2 oz/week     2 Shots of liquor per week      Comment: 3 x week   • Drug use: No   • Sexual activity: Not on file     Other Topics Concern   • Not on file     Social History Narrative   • No narrative on file       Family History   Problem Relation Age of Onset   • Stroke Mother    • Heart Mother    • Stroke Father    • Heart Father        PHYSICAL EXAMINATION:  VITALS:   Visit Vitals  /82   Pulse 77   Temp 97.6 °F (36.4 °C) (Temporal Artery)   Resp 16   Ht 6' 1\" (1.854 m)   Wt  Chief Complaint   Patient presents with   • Hypertension Follow-up       Subjective:     HPI:   Karen Dimas presents today with the followin. Essential hypertension  HTN - Chronic condition stable. Currently taking all meds as directed.   She is not taking baby aspirin daily.   She is monitoring BP at home. Has been generally 120-150s, high of 190 and low of 114, usually in mid ranges.  Blood pressure control has been much better on the combination of medication.  She is on amlodipine and losartan.  Denies symptoms low BP: light-headed, tunnel-vision, unusual fatigue.   Denies symptoms high BP:pounding headache, visual changes, palpitations, flushed face.   Denies medicine side effects: unusual fatigue, slow heartbeat, foot/leg swelling, cough.    2. Situational anxiety  Patient does have significant anxiety with her esophageal problems and her medical problems.  This is not unreasonable.  Patient does feel the alprazolam is extremely important for her and is helpful.  She has refills available.    3. Pain of upper abdomen  She was unable to get liver biopsy due to high blood pressure, better on the amlodipine.  She is still having significant pain in the upper abdomen and out to both sides of her thorax.  This seems to be more GI related.  She will be having an endoscopy next week.  She is anxiously awaiting those results.    4. Elevated sedimentation rate/JARRETT positive/Autoimmune disease (HCC)  Patient had a very high JARRETT though it has been very difficult to pin down what sort of autoimmune disorder she has.  Sed rate was also very high and there is a strong possibility that she has autoimmune hepatitis but that we will have to await biopsy results.        Patient Active Problem List    Diagnosis Date Noted   • Elevated sedimentation rate 2022   • JARRETT positive 2022   • Autoimmune disease (HCC) 2022   • Pain of upper abdomen 2022   • Aortic atherosclerosis (HCC) 2022   •  96.2 kg   SpO2 96%   BMI 27.97 kg/m²     NECK:  Without bruits or lymphadenopathy.  LUNGS:  Clear to auscultation.  HEART:  Sounds are regular without obvious murmur.  No peripheral edema or  signs of ischemia.  ABD: soft, nontender, good bowel sounds.       Myofascial pain on left side 11/30/2021   • Muscle tenderness 11/30/2021   • Other chest pain 11/11/2021   • History of 2019 novel coronavirus disease (COVID-19) 11/11/2021   • BMI 26.0-26.9,adult 08/24/2021   • Mild episode of recurrent major depressive disorder (HCC) 08/24/2021   • History of Gómez's esophagus 05/13/2021   • Abnormal ultrasound of liver 05/13/2021   • Bilateral radicular pain 01/20/2021   • Muscle spasm 01/20/2021   • Mixed hyperlipidemia 01/20/2021   • Vitamin D deficiency 01/20/2021   • Intervertebral cervical disc disorder with myelopathy, cervical region 11/29/2017   • Transaminitis 12/19/2016   • Hyperglobulinemia 12/19/2016   • Osteoporosis 12/18/2016   • Weakness 07/31/2015   • Stress-induced cardiomyopathy 09/30/2014   • Situational anxiety 09/29/2014   • Microscopic colitis 09/28/2014   • Essential hypertension 09/26/2014   • Insomnia 09/26/2014   • Gastritis 09/25/2014       Current medicines (including changes today)  Current Outpatient Medications   Medication Sig Dispense Refill   • famotidine (PEPCID) 20 MG Tab Take 1 Tablet by mouth 2 times a day. 180 Tablet 2   • amLODIPine (NORVASC) 5 MG Tab Take 1 Tablet by mouth every day. 90 Tablet 3   • Vitamin E 268 MG (400 UNIT) Cap Take  by mouth every day.     • Ascorbic Acid (VITAMIN C) 1000 MG Tab Take  by mouth every day.     • Multiple Vitamin Tab Take  by mouth.     • atorvastatin (LIPITOR) 20 MG Tab Take 1 Tablet by mouth at bedtime. 90 Tablet 3   • ALPRAZolam (XANAX) 0.5 MG Tab Take 1 Tablet by mouth 2 times a day as needed for Sleep or Anxiety for up to 90 days. 60 tablets is a 30 day quantity 60 Tablet 2   • losartan (COZAAR) 100 MG Tab Take 1 Tablet by mouth every day. 90 Tablet 3   • aluminum chloride (DRYSOL) 20 % external solution Apply to axillary regions nightly (Patient taking differently: at bedtime as needed. Apply to axillary regions nightly) 60 mL 4   • Cholecalciferol (VITAMIN D3) 2000 UNIT Cap Take  by mouth every  "day.       No current facility-administered medications for this visit.       Allergies   Allergen Reactions   • Codeine Vomiting     [id number]   • Cefdinir      Rash   • Hydrocodone Vomiting   • Oxycodone Vomiting   • Tramadol Vomiting     itching       ROS: As per HPI       Objective:     /74 (BP Location: Right arm, Patient Position: Sitting, BP Cuff Size: Adult)   Pulse 82   Temp 36.5 °C (97.7 °F) (Temporal)   Ht 1.575 m (5' 2\")   Wt 64 kg (141 lb)   SpO2 96%  Body mass index is 25.79 kg/m².    Physical Exam:  Constitutional: Well-developed and well-nourished. Not diaphoretic. No distress. Lucid and fluent.  Patient, physician and staff all wearing masks  Skin: Skin is warm and dry. No rash noted.  Head: Atraumatic without lesions.  Eyes: Conjunctivae and extraocular motions are normal. Pupils are equal, round, and reactive to light. No scleral icterus.   Ears:  External ears unremarkable.   Neck: Supple, trachea midline. No thyromegaly present. No cervical or supraclavicular lymphadenopathy. No JVD or carotid bruits appreciated  Cardiovascular: Regular rate and rhythm.  Normal S1, S2 without murmur appreciated.  Chest: Effort normal. Clear to auscultation throughout. No adventitious sounds.   Abdomen: Soft, diffusely tender, without distention. Active bowel sounds in all four quadrants. No rebound, guarding, masses or hepatosplenomegaly.  Extremities: No cyanosis, clubbing, erythema, nor edema.   Neurological: Alert and oriented x 3. No tremor appreciated.  Psychiatric:  Behavior, mood, and affect are appropriate.       Assessment and Plan:     71 y.o. female with the following issues:    1. Essential hypertension  amLODIPine (NORVASC) 5 MG Tab   2. Situational anxiety     3. Pain of upper abdomen  famotidine (PEPCID) 20 MG Tab   4. Elevated sedimentation rate     5. JARRETT positive     6. Autoimmune disease (HCC)           Followup: Return in about 4 months (around 8/20/2022), or if symptoms worsen or " fail to improve.

## 2022-04-21 RX ORDER — ENALAPRIL MALEATE 20 MG/1
TABLET ORAL
Qty: 200 TABLET | Refills: 3 | Status: SHIPPED | OUTPATIENT
Start: 2022-04-21 | End: 2022-05-16

## 2022-05-05 ENCOUNTER — PRE-ADMISSION TESTING (OUTPATIENT)
Dept: ADMISSIONS | Facility: MEDICAL CENTER | Age: 72
End: 2022-05-05
Attending: INTERNAL MEDICINE
Payer: MEDICARE

## 2022-05-10 ENCOUNTER — HOSPITAL ENCOUNTER (EMERGENCY)
Facility: MEDICAL CENTER | Age: 72
End: 2022-05-10
Attending: EMERGENCY MEDICINE
Payer: MEDICARE

## 2022-05-10 ENCOUNTER — HOSPITAL ENCOUNTER (OUTPATIENT)
Facility: MEDICAL CENTER | Age: 72
End: 2022-05-10
Attending: INTERNAL MEDICINE | Admitting: STUDENT IN AN ORGANIZED HEALTH CARE EDUCATION/TRAINING PROGRAM
Payer: MEDICARE

## 2022-05-10 ENCOUNTER — APPOINTMENT (OUTPATIENT)
Dept: RADIOLOGY | Facility: MEDICAL CENTER | Age: 72
End: 2022-05-10
Attending: EMERGENCY MEDICINE
Payer: MEDICARE

## 2022-05-10 ENCOUNTER — HOSPITAL ENCOUNTER (OUTPATIENT)
Dept: RADIOLOGY | Facility: MEDICAL CENTER | Age: 72
End: 2022-05-10
Attending: INTERNAL MEDICINE
Payer: MEDICARE

## 2022-05-10 VITALS
BODY MASS INDEX: 25.03 KG/M2 | WEIGHT: 136 LBS | DIASTOLIC BLOOD PRESSURE: 79 MMHG | RESPIRATION RATE: 17 BRPM | SYSTOLIC BLOOD PRESSURE: 138 MMHG | OXYGEN SATURATION: 98 % | HEART RATE: 78 BPM | HEIGHT: 62 IN

## 2022-05-10 VITALS
SYSTOLIC BLOOD PRESSURE: 201 MMHG | DIASTOLIC BLOOD PRESSURE: 81 MMHG | TEMPERATURE: 96.7 F | HEART RATE: 85 BPM | OXYGEN SATURATION: 90 % | RESPIRATION RATE: 16 BRPM | HEIGHT: 62 IN | BODY MASS INDEX: 25.03 KG/M2 | WEIGHT: 136 LBS

## 2022-05-10 DIAGNOSIS — R10.9 ABDOMINAL PAIN, UNSPECIFIED ABDOMINAL LOCATION: ICD-10-CM

## 2022-05-10 LAB
ALBUMIN SERPL BCP-MCNC: 4.4 G/DL (ref 3.2–4.9)
ALBUMIN/GLOB SERPL: 1.1 G/DL
ALP SERPL-CCNC: 84 U/L (ref 30–99)
ALT SERPL-CCNC: 39 U/L (ref 2–50)
ANION GAP SERPL CALC-SCNC: 11 MMOL/L (ref 7–16)
AST SERPL-CCNC: 45 U/L (ref 12–45)
BASOPHILS # BLD AUTO: 0.8 % (ref 0–1.8)
BASOPHILS # BLD AUTO: 1.3 % (ref 0–1.8)
BASOPHILS # BLD: 0.08 K/UL (ref 0–0.12)
BASOPHILS # BLD: 0.1 K/UL (ref 0–0.12)
BILIRUB SERPL-MCNC: 0.8 MG/DL (ref 0.1–1.5)
BUN SERPL-MCNC: 14 MG/DL (ref 8–22)
CALCIUM SERPL-MCNC: 9.6 MG/DL (ref 8.4–10.2)
CHLORIDE SERPL-SCNC: 110 MMOL/L (ref 96–112)
CO2 SERPL-SCNC: 21 MMOL/L (ref 20–33)
CREAT SERPL-MCNC: 0.8 MG/DL (ref 0.5–1.4)
EKG IMPRESSION: NORMAL
EOSINOPHIL # BLD AUTO: 0.33 K/UL (ref 0–0.51)
EOSINOPHIL # BLD AUTO: 0.44 K/UL (ref 0–0.51)
EOSINOPHIL NFR BLD: 4.4 % (ref 0–6.9)
EOSINOPHIL NFR BLD: 4.4 % (ref 0–6.9)
ERYTHROCYTE [DISTWIDTH] IN BLOOD BY AUTOMATED COUNT: 41.7 FL (ref 35.9–50)
ERYTHROCYTE [DISTWIDTH] IN BLOOD BY AUTOMATED COUNT: 41.8 FL (ref 35.9–50)
GFR SERPLBLD CREATININE-BSD FMLA CKD-EPI: 79 ML/MIN/1.73 M 2
GLOBULIN SER CALC-MCNC: 4 G/DL (ref 1.9–3.5)
GLUCOSE SERPL-MCNC: 102 MG/DL (ref 65–99)
HCT VFR BLD AUTO: 45.2 % (ref 37–47)
HCT VFR BLD AUTO: 46.7 % (ref 37–47)
HGB BLD-MCNC: 15.9 G/DL (ref 12–16)
HGB BLD-MCNC: 16.3 G/DL (ref 12–16)
IMM GRANULOCYTES # BLD AUTO: 0.02 K/UL (ref 0–0.11)
IMM GRANULOCYTES # BLD AUTO: 0.03 K/UL (ref 0–0.11)
IMM GRANULOCYTES NFR BLD AUTO: 0.3 % (ref 0–0.9)
IMM GRANULOCYTES NFR BLD AUTO: 0.3 % (ref 0–0.9)
INR PPP: 1.02 (ref 0.87–1.13)
LYMPHOCYTES # BLD AUTO: 3.3 K/UL (ref 1–4.8)
LYMPHOCYTES # BLD AUTO: 4.85 K/UL (ref 1–4.8)
LYMPHOCYTES NFR BLD: 43.9 % (ref 22–41)
LYMPHOCYTES NFR BLD: 49 % (ref 22–41)
MCH RBC QN AUTO: 31.6 PG (ref 27–33)
MCH RBC QN AUTO: 31.9 PG (ref 27–33)
MCHC RBC AUTO-ENTMCNC: 34.9 G/DL (ref 33.6–35)
MCHC RBC AUTO-ENTMCNC: 35.2 G/DL (ref 33.6–35)
MCV RBC AUTO: 90.5 FL (ref 81.4–97.8)
MCV RBC AUTO: 90.8 FL (ref 81.4–97.8)
MONOCYTES # BLD AUTO: 0.7 K/UL (ref 0–0.85)
MONOCYTES # BLD AUTO: 0.8 K/UL (ref 0–0.85)
MONOCYTES NFR BLD AUTO: 8.1 % (ref 0–13.4)
MONOCYTES NFR BLD AUTO: 9.3 % (ref 0–13.4)
NEUTROPHILS # BLD AUTO: 3.06 K/UL (ref 2–7.15)
NEUTROPHILS # BLD AUTO: 3.7 K/UL (ref 2–7.15)
NEUTROPHILS NFR BLD: 37.4 % (ref 44–72)
NEUTROPHILS NFR BLD: 40.8 % (ref 44–72)
NRBC # BLD AUTO: 0 K/UL
NRBC # BLD AUTO: 0 K/UL
NRBC BLD-RTO: 0 /100 WBC
NRBC BLD-RTO: 0 /100 WBC
PLATELET # BLD AUTO: 237 K/UL (ref 164–446)
PLATELET # BLD AUTO: 271 K/UL (ref 164–446)
PMV BLD AUTO: 10.1 FL (ref 9–12.9)
PMV BLD AUTO: 9.8 FL (ref 9–12.9)
POTASSIUM SERPL-SCNC: 4 MMOL/L (ref 3.6–5.5)
PROT SERPL-MCNC: 8.4 G/DL (ref 6–8.2)
PROTHROMBIN TIME: 13.1 SEC (ref 12–14.6)
RBC # BLD AUTO: 4.98 M/UL (ref 4.2–5.4)
RBC # BLD AUTO: 5.16 M/UL (ref 4.2–5.4)
SODIUM SERPL-SCNC: 142 MMOL/L (ref 135–145)
WBC # BLD AUTO: 7.5 K/UL (ref 4.8–10.8)
WBC # BLD AUTO: 9.9 K/UL (ref 4.8–10.8)

## 2022-05-10 PROCEDURE — 71045 X-RAY EXAM CHEST 1 VIEW: CPT

## 2022-05-10 PROCEDURE — 88307 TISSUE EXAM BY PATHOLOGIST: CPT

## 2022-05-10 PROCEDURE — 85025 COMPLETE CBC W/AUTO DIFF WBC: CPT | Mod: 91

## 2022-05-10 PROCEDURE — 700111 HCHG RX REV CODE 636 W/ 250 OVERRIDE (IP): Performed by: STUDENT IN AN ORGANIZED HEALTH CARE EDUCATION/TRAINING PROGRAM

## 2022-05-10 PROCEDURE — 700111 HCHG RX REV CODE 636 W/ 250 OVERRIDE (IP)

## 2022-05-10 PROCEDURE — 99285 EMERGENCY DEPT VISIT HI MDM: CPT

## 2022-05-10 PROCEDURE — 71260 CT THORAX DX C+: CPT | Mod: MG

## 2022-05-10 PROCEDURE — 85610 PROTHROMBIN TIME: CPT

## 2022-05-10 PROCEDURE — 96374 THER/PROPH/DIAG INJ IV PUSH: CPT | Mod: XU

## 2022-05-10 PROCEDURE — 93005 ELECTROCARDIOGRAM TRACING: CPT | Performed by: EMERGENCY MEDICINE

## 2022-05-10 PROCEDURE — 47000 NEEDLE BIOPSY OF LIVER PERQ: CPT

## 2022-05-10 PROCEDURE — 80053 COMPREHEN METABOLIC PANEL: CPT

## 2022-05-10 PROCEDURE — 36415 COLL VENOUS BLD VENIPUNCTURE: CPT

## 2022-05-10 PROCEDURE — 99152 MOD SED SAME PHYS/QHP 5/>YRS: CPT

## 2022-05-10 PROCEDURE — 88313 SPECIAL STAINS GROUP 2: CPT | Mod: 91

## 2022-05-10 PROCEDURE — 700117 HCHG RX CONTRAST REV CODE 255: Performed by: EMERGENCY MEDICINE

## 2022-05-10 PROCEDURE — 700111 HCHG RX REV CODE 636 W/ 250 OVERRIDE (IP): Performed by: EMERGENCY MEDICINE

## 2022-05-10 PROCEDURE — 76942 ECHO GUIDE FOR BIOPSY: CPT | Mod: XU

## 2022-05-10 PROCEDURE — 85025 COMPLETE CBC W/AUTO DIFF WBC: CPT

## 2022-05-10 PROCEDURE — 96375 TX/PRO/DX INJ NEW DRUG ADDON: CPT | Mod: XU

## 2022-05-10 RX ORDER — MIDAZOLAM HYDROCHLORIDE 1 MG/ML
.5-2 INJECTION INTRAMUSCULAR; INTRAVENOUS PRN
Status: CANCELLED | OUTPATIENT
Start: 2022-05-10 | End: 2022-05-10

## 2022-05-10 RX ORDER — MORPHINE SULFATE 4 MG/ML
4 INJECTION INTRAVENOUS ONCE
Status: COMPLETED | OUTPATIENT
Start: 2022-05-10 | End: 2022-05-10

## 2022-05-10 RX ORDER — ONDANSETRON 2 MG/ML
4 INJECTION INTRAMUSCULAR; INTRAVENOUS ONCE
Status: COMPLETED | OUTPATIENT
Start: 2022-05-10 | End: 2022-05-10

## 2022-05-10 RX ORDER — ONDANSETRON 2 MG/ML
4 INJECTION INTRAMUSCULAR; INTRAVENOUS PRN
Status: CANCELLED | OUTPATIENT
Start: 2022-05-10 | End: 2022-05-10

## 2022-05-10 RX ORDER — OXYCODONE HYDROCHLORIDE 5 MG/1
TABLET ORAL
Status: DISCONTINUED
Start: 2022-05-10 | End: 2022-05-11 | Stop reason: HOSPADM

## 2022-05-10 RX ORDER — SODIUM CHLORIDE 9 MG/ML
500 INJECTION, SOLUTION INTRAVENOUS
Status: CANCELLED | OUTPATIENT
Start: 2022-05-10 | End: 2022-05-10

## 2022-05-10 RX ORDER — ONDANSETRON 2 MG/ML
4 INJECTION INTRAMUSCULAR; INTRAVENOUS PRN
Status: DISCONTINUED | OUTPATIENT
Start: 2022-05-10 | End: 2022-05-10 | Stop reason: HOSPADM

## 2022-05-10 RX ORDER — MORPHINE SULFATE 4 MG/ML
INJECTION INTRAVENOUS
Status: COMPLETED
Start: 2022-05-10 | End: 2022-05-10

## 2022-05-10 RX ORDER — MORPHINE SULFATE 4 MG/ML
4 INJECTION INTRAVENOUS ONCE
Status: DISCONTINUED | OUTPATIENT
Start: 2022-05-10 | End: 2022-05-10 | Stop reason: HOSPADM

## 2022-05-10 RX ORDER — SODIUM CHLORIDE 9 MG/ML
500 INJECTION, SOLUTION INTRAVENOUS
Status: DISCONTINUED | OUTPATIENT
Start: 2022-05-10 | End: 2022-05-10 | Stop reason: HOSPADM

## 2022-05-10 RX ORDER — MIDAZOLAM HYDROCHLORIDE 1 MG/ML
.5-2 INJECTION INTRAMUSCULAR; INTRAVENOUS PRN
Status: DISCONTINUED | OUTPATIENT
Start: 2022-05-10 | End: 2022-05-10 | Stop reason: HOSPADM

## 2022-05-10 RX ORDER — MIDAZOLAM HYDROCHLORIDE 1 MG/ML
INJECTION INTRAMUSCULAR; INTRAVENOUS
Status: COMPLETED
Start: 2022-05-10 | End: 2022-05-10

## 2022-05-10 RX ADMIN — MIDAZOLAM HYDROCHLORIDE 1 MG: 1 INJECTION, SOLUTION INTRAMUSCULAR; INTRAVENOUS at 13:03

## 2022-05-10 RX ADMIN — FENTANYL CITRATE 50 MCG: 50 INJECTION, SOLUTION INTRAMUSCULAR; INTRAVENOUS at 13:03

## 2022-05-10 RX ADMIN — ONDANSETRON 4 MG: 2 INJECTION INTRAMUSCULAR; INTRAVENOUS at 14:47

## 2022-05-10 RX ADMIN — IOHEXOL 90 ML: 350 INJECTION, SOLUTION INTRAVENOUS at 16:44

## 2022-05-10 RX ADMIN — MORPHINE SULFATE 4 MG: 4 INJECTION INTRAVENOUS at 13:52

## 2022-05-10 RX ADMIN — FENTANYL CITRATE 50 MCG: 50 INJECTION, SOLUTION INTRAMUSCULAR; INTRAVENOUS at 14:48

## 2022-05-10 RX ADMIN — FENTANYL CITRATE 25 MCG: 50 INJECTION, SOLUTION INTRAMUSCULAR; INTRAVENOUS at 13:06

## 2022-05-10 RX ADMIN — MIDAZOLAM HYDROCHLORIDE 0.5 MG: 1 INJECTION, SOLUTION INTRAMUSCULAR; INTRAVENOUS at 13:06

## 2022-05-10 ASSESSMENT — FIBROSIS 4 INDEX
FIB4 SCORE: 1.893458524812514922
FIB4 SCORE: 1.65

## 2022-05-10 ASSESSMENT — PAIN DESCRIPTION - PAIN TYPE: TYPE: ACUTE PAIN

## 2022-05-10 NOTE — ED TRIAGE NOTES
".  Chief Complaint   Patient presents with   • Chest Pain   • Abdominal Pain      Pt BIB staff from IR. Per report Pt had needle biopsy of liver this morning, after procedure began complaining of abdomen/chest pain. Pt was given Morphine 4mg after procedure for pain, still has 10/10 pain.    Pt c/o mid epigastric pain, states \"I feel like I am having a heart attack\".  "

## 2022-05-10 NOTE — PROGRESS NOTES
Pt presents to [facility] for a liver bx. Pt consented at BS by Dr. Espinal. Pt placed on cardiac monitor, SPO2, and NIBP with limits set and alarms audible. Pt placed on 2L NC with end end tidal monitoring. Pt prepped and draped in sterile fashion.     1303 1mg versed 50 mcg fentanyl given IVP     1306 0.5mg versed 25mcg fentanyl given IVP    1310 Time out procedure start     1313 2 core needle liver bx taken. Pt tolerated well. Dressing placed. Denies pain     1315 procedure end pt transitioned to recovery. Pt to rest 2 hrs pt tolerated water and pretzels.    1352 pt complains of abd pain. Dr. Espinal looked on US no signs of internal bleeding. Pt medicated with 4mg morphine IVP for pain      1410 pt complains of abd pain and SOB. Pt vital signs stable. MD aware. Pt to be transferred to ED green 38. Report given at BS. Dr. Espinal aware agrees pt needs to go to ED for further work up.

## 2022-05-10 NOTE — ED PROVIDER NOTES
ED Provider Note    CHIEF COMPLAINT  Chief Complaint   Patient presents with   • Chest Pain   • Abdominal Pain       HPI  Karen Dimas is a 71 y.o. female who presents to the emergency department with complaint of chest pain abdominal pain.  The patient was undergoing a liver biopsy ultrasound-guided with interventional radiology and when she awoke she had profound pain.  She received Versed and fentanyl during the procedure and morphine post procedure.  The pain is under center abdomen she states that radiated to her chest and she has shortness of breath associated.  I received a call from the radiologist concerning the patient need for care.  The patient states the pain is sharp in nature is at the insertion site and she has slight shortness of breath.  Denies fever, shakes, chills, sweats, nausea, vomiting  REVIEW OF SYSTEMS  Positives as above. Pertinent negatives include fever, shakes, chills, sweats, nausea, vomiting all other 10 review of systems are negative    PAST MEDICAL HISTORY  Past Medical History:   Diagnosis Date   • Anesthesia     Pt. reports anesthesia gives her migraines and she gets violent.    • Aortic atherosclerosis (HCC) 3/1/2022   • Cataract     no surgery   • Chronic back pain     and neck   • Cirrhosis (HCC)    • Dental disorder 03/15/2022    full dentures   • Elevated liver enzymes 1/20/2021   • Heart burn    • Hemorrhagic disorder (HCC)     hemorrhaged after hysterectomy   • Hiatus hernia syndrome    • High cholesterol    • History of blood transfusion     Early 1980's - During hysterectomy pt needed a blood transfusion.   • Hypertension    • Indigestion    • Myocardial infarct (HCC) 1995    showed up on ekg, sees once a year cardiology   • Nevus of finger of right hand 1/20/2021   • Pneumonia     as a child   • Psychiatric disorder 09/07/2021    Bipolar/PTSD - no medication. Pt states she has a good support system.   • Psychiatric problem     anxiety/depression   • Rib pain on  left side 2021   • Smokers' cough (HCC) 2021: Patient denies   • Stress-induced cardiomyopathy 2014       FAMILY HISTORY  Noncontributory    SOCIAL HISTORY  Social History     Socioeconomic History   • Marital status:    Tobacco Use   • Smoking status: Current Some Day Smoker     Years: 20.00     Types: Cigarettes     Last attempt to quit: 10/24/2017     Years since quittin.5   • Smokeless tobacco: Never Used   • Tobacco comment: trying hard to quit. 22: 4 cigarettes a day.    Vaping Use   • Vaping Use: Never used   Substance and Sexual Activity   • Alcohol use: Yes     Comment: 3-4 per year 22: 3 drinks a year   • Drug use: Yes     Types: Marijuana, Inhaled, Oral     Comment: marijuana used to relax.   • Sexual activity: Not Currently       SURGICAL HISTORY  Past Surgical History:   Procedure Laterality Date   • CERVICAL DISK AND FUSION ANTERIOR  2017    Procedure: CERVICAL DISK AND FUSION ANTERIOR C4-7 WITH PLATE;  Surgeon: Prem Back M.D.;  Location: SURGERY [facility];  Service: Neurosurgery   • GASTROSCOPY WITH BIOPSY  2014    Performed by Carlos Ivan M.D. at ENDOSCOPY [facility]   • COLONOSCOPY WITH BIOPSY  2014    Performed by Carlos Ivan M.D. at ENDOSCOPY [facility]   • CHOLECYSTECTOMY     • GYN SURGERY  Early     Hysterectomy - partial   • GYN SURGERY  Early     Bilateral Ovaries removed.       CURRENT MEDICATIONS  Home Medications     Reviewed by Deidre Kumar R.N. (Registered Nurse) on 05/10/22 at 1441  Med List Status: <None>   Medication Last Dose Status   ALPRAZolam (XANAX) 0.5 MG Tab  Active   aluminum chloride (DRYSOL) 20 % external solution  Active   amLODIPine (NORVASC) 5 MG Tab  Active   Ascorbic Acid (VITAMIN C) 1000 MG Tab  Active   atorvastatin (LIPITOR) 20 MG Tab  Active   Cholecalciferol (VITAMIN D3) 2000 UNIT Cap  Active   enalapril (VASOTEC) 20 MG tablet  Active  "  famotidine (PEPCID) 20 MG Tab  Active   losartan (COZAAR) 100 MG Tab  Active   Multiple Vitamin Tab  Active   Vitamin E 268 MG (400 UNIT) Cap  Active                ALLERGIES  Allergies   Allergen Reactions   • Codeine Vomiting     [id number]   • Cefdinir      Rash   • Hydrocodone Vomiting   • Oxycodone Vomiting   • Tramadol Vomiting     itching       PHYSICAL EXAM  VITAL SIGNS: BP (!) 201/81   Pulse 85   Temp 35.9 °C (96.7 °F)   Resp 16   Ht 1.575 m (5' 2\")   Wt 61.7 kg (136 lb)   SpO2 90%   BMI 24.87 kg/m²      Constitutional: Well developed, Well nourished, slight acute distress, Non-toxic appearance.   Eyes: PERRLA, EOMI, Conjunctiva normal, No discharge.   Cardiovascular: Normal heart rate, Normal rhythm, No murmurs, No rubs, No gallops, and intact distal pulses.   Thorax & Lungs:  No respiratory distress, no rales, no rhonchi, No wheezing, No chest wall tenderness.   Abdomen: Puncture wound in the epigastric region, bandage, no surrounding erythema, edema or bleeding, bowel sounds normal, Soft, No tenderness, No guarding, No rebound, No pulsatile masses.   Skin: Warm, Dry, No erythema, No rash.   Extremities: Full range of motion, no deformity, no edema.  Neurologic: Alert & oriented x 3, No focal deficits noted, acting appropriately on exam.  Psychiatric: Anxious      LABORATORY/ECG  Results for orders placed or performed during the hospital encounter of 05/10/22   CBC WITH DIFFERENTIAL   Result Value Ref Range    WBC 9.9 4.8 - 10.8 K/uL    RBC 4.98 4.20 - 5.40 M/uL    Hemoglobin 15.9 12.0 - 16.0 g/dL    Hematocrit 45.2 37.0 - 47.0 %    MCV 90.8 81.4 - 97.8 fL    MCH 31.9 27.0 - 33.0 pg    MCHC 35.2 (H) 33.6 - 35.0 g/dL    RDW 41.7 35.9 - 50.0 fL    Platelet Count 271 164 - 446 K/uL    MPV 10.1 9.0 - 12.9 fL    Neutrophils-Polys 37.40 (L) 44.00 - 72.00 %    Lymphocytes 49.00 (H) 22.00 - 41.00 %    Monocytes 8.10 0.00 - 13.40 %    Eosinophils 4.40 0.00 - 6.90 %    Basophils 0.80 0.00 - 1.80 %    " Immature Granulocytes 0.30 0.00 - 0.90 %    Nucleated RBC 0.00 /100 WBC    Neutrophils (Absolute) 3.70 2.00 - 7.15 K/uL    Lymphs (Absolute) 4.85 (H) 1.00 - 4.80 K/uL    Monos (Absolute) 0.80 0.00 - 0.85 K/uL    Eos (Absolute) 0.44 0.00 - 0.51 K/uL    Baso (Absolute) 0.08 0.00 - 0.12 K/uL    Immature Granulocytes (abs) 0.03 0.00 - 0.11 K/uL    NRBC (Absolute) 0.00 K/uL   CMP   Result Value Ref Range    Sodium 142 135 - 145 mmol/L    Potassium 4.0 3.6 - 5.5 mmol/L    Chloride 110 96 - 112 mmol/L    Co2 21 20 - 33 mmol/L    Anion Gap 11.0 7.0 - 16.0    Glucose 102 (H) 65 - 99 mg/dL    Bun 14 8 - 22 mg/dL    Creatinine 0.80 0.50 - 1.40 mg/dL    Calcium 9.6 8.4 - 10.2 mg/dL    AST(SGOT) 45 12 - 45 U/L    ALT(SGPT) 39 2 - 50 U/L    Alkaline Phosphatase 84 30 - 99 U/L    Total Bilirubin 0.8 0.1 - 1.5 mg/dL    Albumin 4.4 3.2 - 4.9 g/dL    Total Protein 8.4 (H) 6.0 - 8.2 g/dL    Globulin 4.0 (H) 1.9 - 3.5 g/dL    A-G Ratio 1.1 g/dL   ESTIMATED GFR   Result Value Ref Range    GFR (CKD-EPI) 79 >60 mL/min/1.73 m 2   EKG   Result Value Ref Range    Report       [facility] Emergency Dept.    Test Date:  2022-05-10  Pt Name:    DONAL BOOKER               Department: ER  MRN:        [id number]                      Room:        38  Gender:     Female                       Technician: [id number]  :        1950                   Requested By:KIMBER DEL TORO  Order #:    [id number]                    Reading MD: KIMBER DEL TORO, DO    Measurements  Intervals                                Axis  Rate:       71                           P:          53  ID:         [id number]                          QRS:        2  QRSD:       88                           T:          29  QT:         440  QTc:        479    Interpretive Statements  SINUS RHYTHM  EARLY PRECORDIAL R/S TRANSITION  Compared to ECG 2017 14:35:57  No significant changes  Electronically Signed On 5- 19:28:00 PDT by KIMBER DEL TORO,  DO     '      RADIOLOGY/PROCEDURES  CT-CHEST,ABDOMEN,PELVIS WITH   Final Result      1.  No significant pleural fluid or pneumothorax.   2.  Mildly nodular liver concerning for cirrhosis.   3.  No pneumoperitoneum or peripancreatic fluid collection.   4.  Intrahepatic and extra hepatic biliary dilation, likely postoperative.   5.  No focal mesenteric inflammatory process.   6.  Colonic diverticulosis.      DX-CHEST-PORTABLE (1 VIEW)   Final Result      No acute cardiopulmonary disease.            COURSE & MEDICAL DECISION MAKING  Pertinent Labs & Imaging studies reviewed. (See chart for details)  This is a pleasant 18-year female presents after undergoing ultrasound-guided liver biopsy.  Here in the emergency department, x-rays completed showed notes of free air or pneumothorax.  CT chest and pelvis are negative for acute abnormality such as pneumothorax, hemothorax, liver laceration, perforation of the colon enteritis and intra-abdominal abnormality.  She received fentanyl 50 mcg IV, Zofran 4 mg IV.  She observed for many hours and on reevaluation she was positive p.o., she felt much better.  The patient is discharged with strict return precautions.  I do believe the patient is experience acute pain secondary to procedure but I do not believe she has an acute emergency medical condition requiring further hospitalization observation.  FINAL IMPRESSION     1. Abdominal pain, unspecified abdominal location        DISPOSITION:  Patient will be discharged home in stable condition.    FOLLOW UP:  [facility], Emergency Dept  [address] [facility] [facility] [id number] [phone]    If symptoms worsen    Ryan De Jesus M.D.  [address] [place] [id number] [phone]    Schedule an appointment as soon as possible for a visit   As needed      Electronically signed by: Josue Denton D.O., 5/10/2022 3:47 PM

## 2022-05-10 NOTE — H&P
History and Physical    Date: 5/10/2022    PCP: Ryan De Jesus M.D.      CC: elevated LFTs    HPI: This is a 71 y.o. female who is presenting with elevated LFTs    Past Medical History:   Diagnosis Date   • Anesthesia     Pt. reports anesthesia gives her migraines and she gets violent.    • Aortic atherosclerosis (HCC) 3/1/2022   • Cataract     no surgery   • Chronic back pain     and neck   • Cirrhosis (HCC)    • Dental disorder 03/15/2022    full dentures   • Elevated liver enzymes 1/20/2021   • Heart burn    • Hemorrhagic disorder (HCC)     hemorrhaged after hysterectomy   • Hiatus hernia syndrome    • High cholesterol    • History of blood transfusion     Early 1980's - During hysterectomy pt needed a blood transfusion.   • Hypertension    • Indigestion    • Myocardial infarct (HCC) 1995    showed up on ekg, sees once a year cardiology   • Nevus of finger of right hand 1/20/2021   • Pneumonia     as a child   • Psychiatric disorder 09/07/2021    Bipolar/PTSD - no medication. Pt states she has a good support system.   • Psychiatric problem     anxiety/depression   • Rib pain on left side 11/30/2021   • Smokers' cough (HCC) 09/07/2021 5/5/22: Patient denies   • Stress-induced cardiomyopathy 9/30/2014       Past Surgical History:   Procedure Laterality Date   • CERVICAL DISK AND FUSION ANTERIOR  11/27/2017    Procedure: CERVICAL DISK AND FUSION ANTERIOR C4-7 WITH PLATE;  Surgeon: Prem Back M.D.;  Location: SURGERY [facility];  Service: Neurosurgery   • GASTROSCOPY WITH BIOPSY  9/28/2014    Performed by Carlos Ivan M.D. at ENDOSCOPY [facility]   • COLONOSCOPY WITH BIOPSY  9/28/2014    Performed by Carlos Ivan M.D. at ENDOSCOPY [facility]   • CHOLECYSTECTOMY     • GYN SURGERY  Early 1980's    Hysterectomy - partial   • GYN SURGERY  Early 1980's    Bilateral Ovaries removed.       No current facility-administered medications for this encounter.        Social History      Socioeconomic History   • Marital status:      Spouse name: Not on file   • Number of children: Not on file   • Years of education: Not on file   • Highest education level: Not on file   Occupational History   • Not on file   Tobacco Use   • Smoking status: Current Some Day Smoker     Years: 20.00     Types: Cigarettes     Last attempt to quit: 10/24/2017     Years since quittin.5   • Smokeless tobacco: Never Used   • Tobacco comment: trying hard to quit. 22: 4 cigarettes a day.    Vaping Use   • Vaping Use: Never used   Substance and Sexual Activity   • Alcohol use: Yes     Comment: 3-4 per year 22: 3 drinks a year   • Drug use: Yes     Types: Marijuana, Inhaled, Oral     Comment: marijuana used to relax.   • Sexual activity: Not Currently   Other Topics Concern   • Not on file   Social History Narrative   • Not on file     Social Determinants of Health     Financial Resource Strain: Not on file   Food Insecurity: Not on file   Transportation Needs: Not on file   Physical Activity: Not on file   Stress: Not on file   Social Connections: Not on file   Intimate Partner Violence: Not on file   Housing Stability: Not on file       No family history on file.    Allergies:  Codeine, Cefdinir, Hydrocodone, Oxycodone, and Tramadol    Review of Systems:  Negative except for none    Physical Exam    Vital Signs                           Labs:                    Radiology:  No orders to display             Assessment and Plan:This is a 71 y.o. with elevated LFTs for liver biopsy.

## 2022-05-10 NOTE — OR SURGEON
Immediate Post- Operative Note        Findings: Liver tissue      Procedure(s): US guided left liver biopsy      Estimated Blood Loss: Less than 5 ml        Complications: Pt with pain out of proportion to expected after minimally invasive procedure. No hematoma or evidence of active hemorrhage on bedside ultrasound. Patient to ED for further evaluation. Discussed with Dr. Moran.            5/10/2022     2:22 PM     Prem Espinal M.D.

## 2022-05-11 ENCOUNTER — PATIENT OUTREACH (OUTPATIENT)
Dept: MEDICAL GROUP | Facility: MEDICAL CENTER | Age: 72
End: 2022-05-11
Payer: MEDICARE

## 2022-05-11 NOTE — PROGRESS NOTES
RN Transitional Care Management discharge follow-up call completed. Patient had no questions regarding medications or follow-up care. Discharge follow-up appointment scheduled with PCP on 05/16/2022. Patient declined [facility] RN contact number for any additional questions/concerns. Please route chart back to RN if additional follow-up is needed/requested.     Sincerely,     ANDERS Strickland Care Coordinator  Transitional Care Management [phone]

## 2022-05-11 NOTE — PROGRESS NOTES
Subjective:     Karen Dimas is a 71 y.o. female who presents for Hospital Follow-up.    POST DISCHARGE CALL:  Discharge Date:5/10/2022   Date of Outreach Call: 5/11/2022 11:19 AM  Now that you're home, how are you doing? Fair  Do you have questions about your medications? No  Did you fill your medications? No  Comment:Patient was not discharged with any medications  and does not have any questions regarding her current  medications  Do you have a follow-up appointment scheduled?Yes  Discharging Department: [facility]  Number of Attempts: 1  Current or previous attempts completed within two business days of discharge? Yes  Provided education regarding treatment plan, medication, self-management, ADLs? No  Has patient completed Advance Directive? If yes, advise them to bring to appointment. No  Care Manager phone number provided? No  Is there anything else I can help you with? No    HPI:   Recently hospitalized for severe abdominal pain post liver biopsy on May 10.  No explanation for the pain was found.  CT imaging was reassuring though it still shows nodular liver margins.  No mass was appreciated.      The liver biopsy result is consistent with autoimmune hepatitis as expected.  Sed rate was quite high.  DS DNA strongly positive.  She has seen rheumatology but further treatment was awaiting biopsy.  Referral to rheumatology is placed.  She will also be following up with GI.    Post procedure imaging showed no free air, bleeding or traumatic injury.    Tuberculosis screening needed to proceed with lupus treatment.    Current medicines (including reconciliation performed today)  Current Outpatient Medications   Medication Sig Dispense Refill   • ALPRAZolam (XANAX) 0.5 MG Tab Take 1 Tablet by mouth 2 times a day as needed for Sleep or Anxiety for up to 90 days. 60 tablets is a 30 day quantity 60 Tablet 2   • famotidine (PEPCID) 20 MG Tab Take 1 Tablet by mouth 2 times a day. 180 Tablet 2   •  "amLODIPine (NORVASC) 5 MG Tab Take 1 Tablet by mouth every day. 90 Tablet 3   • Vitamin E 268 MG (400 UNIT) Cap Take  by mouth every day.     • Ascorbic Acid (VITAMIN C) 1000 MG Tab Take 2,000 mg by mouth every day.     • Multiple Vitamin Tab Take  by mouth every day.     • atorvastatin (LIPITOR) 20 MG Tab Take 1 Tablet by mouth at bedtime. 90 Tablet 3   • losartan (COZAAR) 100 MG Tab Take 1 Tablet by mouth every day. 90 Tablet 3   • aluminum chloride (DRYSOL) 20 % external solution Apply to axillary regions nightly (Patient taking differently: at bedtime as needed. Apply to axillary regions nightly) 60 mL 4   • Cholecalciferol (VITAMIN D3) 2000 UNIT Cap Take  by mouth every day. Patient takes 1,000 units a day       No current facility-administered medications for this visit.       Allergies:   Codeine, Cefdinir, Hydrocodone, Oxycodone, and Tramadol    Social History     Tobacco Use   • Smoking status: Current Some Day Smoker     Years: 20.00     Types: Cigarettes     Last attempt to quit: 10/24/2017     Years since quittin.5   • Smokeless tobacco: Never Used   • Tobacco comment: trying hard to quit. 22: 4 cigarettes a day.    Vaping Use   • Vaping Use: Never used   Substance Use Topics   • Alcohol use: Yes     Comment: 3-4 per year 22: 3 drinks a year   • Drug use: Yes     Types: Marijuana, Inhaled, Oral     Comment: marijuana used to relax.       ROS:  As above.  Patient also is having significant anxiety dealing with these problems.  PDMP review shows no overuse.  The medication is renewed.  Patient also says she is doing well on her current blood pressure regimen.    Objective:     Vitals:    22 1635   BP: (!) 142/70   BP Location: Right arm   Patient Position: Sitting   BP Cuff Size: Adult   Pulse: 78   Temp: 36.8 °C (98.2 °F)   TempSrc: Temporal   SpO2: 97%   Weight: 62.1 kg (137 lb)   Height: 1.575 m (5' 2\")     Body mass index is 25.06 kg/m².    Physical Exam:  Vital signs reviewed  Alert " and well-oriented.  Patient, daughter, physician and staff all wearing masks.  HEENT:   EOMI, PERRLA.     Neck:       Full range of motion. No JVD or carotid bruits appreciated. No cervical adenopathy appreciated. No thyromegaly or neck masses appreciated.  No retractions appreciated.  Lungs:     Clear to auscultation A&P with good air movement.   Heart:      Regular rate and rhythm normal S1 and S2 without murmur appreciated.  Strong and symmetric radial and DP pulses.  Ext:         Extremities show symmetric and full range of motion with normal strength. No cyanosis or clubbing appreciated. No peripheral edema appreciated.  Neuro:    Gait is normal.  Movements are symmetric.  Patient is lucid, fluent and appropriate. No significant tremor appreciated.  Skin:       Exhibit no rashes, pigmented lesions or ulcerations.    Results of biopsy reviewed and discussed with patient.  CT results and chest x-ray result also reviewed.  Chest x-ray was normal.  CT did not show any pathology other than the already appreciated nodularity of the liver.    Assessment and Plan:   1. Ds DNA antibody positive  - Referral to Rheumatology    2. Chronic autoimmune hepatitis (HCC)  - Referral to Rheumatology    3. JARRETT positive    4. Autoimmune disease (HCC)  - Referral to Rheumatology    5. Tuberculosis screening  - Quantiferon Gold TB (PPD); Future    6. Situational anxiety  - ALPRAZolam (XANAX) 0.5 MG Tab; Take 1 Tablet by mouth 2 times a day as needed for Sleep or Anxiety for up to 90 days. 60 tablets is a 30 day quantity  Dispense: 60 Tablet; Refill: 2      - Chart and discharge summary were reviewed.   - Hospitalization and results reviewed with patient.   - Medications reviewed including instructions regarding high risk medications, dosing and side effects.  - Recommended Services: No services needed at this time  - Advance directive/POLST on file?  No     Follow-up:Return in about 4 months (around 9/16/2022), or if symptoms worsen  or fail to improve.    Face-to-face transitional care management services with MODERATE (today's visit is within 14 days post discharge & LACE+ score of 28-58) medical decision complexity were provided.     LACE+ Historical Score Over Time (0-28: Low, 29-58: Medium, 59+: High): 66

## 2022-05-13 ENCOUNTER — TELEPHONE (OUTPATIENT)
Dept: MEDICAL GROUP | Facility: MEDICAL CENTER | Age: 72
End: 2022-05-13
Payer: MEDICARE

## 2022-05-13 LAB — PATHOLOGY CONSULT NOTE: NORMAL

## 2022-05-13 NOTE — TELEPHONE ENCOUNTER
ESTABLISHED PATIENT PRE-VISIT PLANNING     Phone Number Called: [phone] (home)   Call outcome: Spoke to patient regarding message below.  Message: Appointment schedule with , Monday, 05/16/2022, check-in: 4:05 PM, [address], [id number].      Patient was contacted to complete PVP.     Note: Patient will not be contacted if there is no indication to call.     1.  Reviewed notes from the last few office visits within the medical group: Yes    2.  If any orders were placed at last visit or intended to be done for this visit (i.e. 6 mos follow-up), do we have Results/Consult Notes?         •  Labs - Labs were not ordered at last office visit.   Last office visit with  was on 04/20/2022.  Note: If patient appointment is for lab review and patient did not complete labs, check with provider if OK to reschedule patient until labs completed.         •  Imaging - Imaging ordered, NOT completed. Patient advised to complete prior to next appointment.    -MAMMO: Not Done. During Pre-Visit Planning called and spoke to patient. Patient states she'll schedule MAMMO in the future when ready. Gave patient available options to schedule either with [facility] or with [facility] Scheduling Teams: [phone].         •  Referrals - No referrals were ordered at last office visit.    3. Is this appointment scheduled as a Hospital Follow-Up? Yes, visit was at [facility].     4.  Immunizations were updated in Epic using Reconcile Outside Information activity? Yes    5.  Patient is due for the following Health Maintenance Topics:   Health Maintenance Due   Topic Date Due   • IMM HEP B VACCINE (1 of 3 - Risk 3-dose series) Never done   • COVID-19 Vaccine (3 - Booster for Pfizer series) 09/16/2021   • MAMMOGRAM  02/19/2022       6.  AHA (Pulse8) form printed for Provider? N/A, Compliant

## 2022-05-16 ENCOUNTER — OFFICE VISIT (OUTPATIENT)
Dept: MEDICAL GROUP | Facility: MEDICAL CENTER | Age: 72
End: 2022-05-16
Payer: MEDICARE

## 2022-05-16 VITALS
HEIGHT: 62 IN | HEART RATE: 78 BPM | SYSTOLIC BLOOD PRESSURE: 142 MMHG | WEIGHT: 137 LBS | TEMPERATURE: 98.2 F | BODY MASS INDEX: 25.21 KG/M2 | DIASTOLIC BLOOD PRESSURE: 70 MMHG | OXYGEN SATURATION: 97 %

## 2022-05-16 DIAGNOSIS — Z11.1 TUBERCULOSIS SCREENING: ICD-10-CM

## 2022-05-16 DIAGNOSIS — M35.9 AUTOIMMUNE DISEASE (HCC): ICD-10-CM

## 2022-05-16 DIAGNOSIS — R76.8 ANA POSITIVE: ICD-10-CM

## 2022-05-16 DIAGNOSIS — K75.4 CHRONIC AUTOIMMUNE HEPATITIS (HCC): ICD-10-CM

## 2022-05-16 DIAGNOSIS — R76.8 DS DNA ANTIBODY POSITIVE: ICD-10-CM

## 2022-05-16 DIAGNOSIS — F41.8 SITUATIONAL ANXIETY: ICD-10-CM

## 2022-05-16 PROCEDURE — 99495 TRANSJ CARE MGMT MOD F2F 14D: CPT | Performed by: FAMILY MEDICINE

## 2022-05-16 RX ORDER — ALPRAZOLAM 0.5 MG/1
0.5 TABLET ORAL 2 TIMES DAILY PRN
Qty: 60 TABLET | Refills: 2 | Status: SHIPPED | OUTPATIENT
Start: 2022-05-16 | End: 2022-06-24

## 2022-05-16 ASSESSMENT — FIBROSIS 4 INDEX: FIB4 SCORE: 1.83

## 2022-05-18 DIAGNOSIS — R76.8 ANA POSITIVE: ICD-10-CM

## 2022-05-18 DIAGNOSIS — M35.9 AUTOIMMUNE DISEASE (HCC): ICD-10-CM

## 2022-05-18 RX ORDER — CELECOXIB 200 MG/1
200 CAPSULE ORAL DAILY
Qty: 30 CAPSULE | Refills: 6 | Status: SHIPPED | OUTPATIENT
Start: 2022-05-18 | End: 2022-07-29

## 2022-05-18 RX ORDER — HYDROXYCHLOROQUINE SULFATE 200 MG/1
200 TABLET, FILM COATED ORAL DAILY
Qty: 30 TABLET | Refills: 6 | Status: SHIPPED | OUTPATIENT
Start: 2022-05-18 | End: 2022-09-29

## 2022-05-20 ENCOUNTER — HOSPITAL ENCOUNTER (OUTPATIENT)
Dept: LAB | Facility: MEDICAL CENTER | Age: 72
End: 2022-05-20
Attending: FAMILY MEDICINE
Payer: MEDICARE

## 2022-05-20 DIAGNOSIS — Z11.1 TUBERCULOSIS SCREENING: ICD-10-CM

## 2022-05-20 PROCEDURE — 86480 TB TEST CELL IMMUN MEASURE: CPT

## 2022-05-20 PROCEDURE — 36415 COLL VENOUS BLD VENIPUNCTURE: CPT

## 2022-05-23 LAB
GAMMA INTERFERON BACKGROUND BLD IA-ACNC: 0.04 IU/ML
M TB IFN-G BLD-IMP: NEGATIVE
M TB IFN-G CD4+ BCKGRND COR BLD-ACNC: 0.07 IU/ML
MITOGEN IGNF BCKGRD COR BLD-ACNC: >10 IU/ML
QFT TB2 - NIL TBQ2: 0.05 IU/ML

## 2022-05-31 DIAGNOSIS — E78.2 MIXED HYPERLIPIDEMIA: ICD-10-CM

## 2022-05-31 RX ORDER — ATORVASTATIN CALCIUM 20 MG/1
20 TABLET, FILM COATED ORAL
Qty: 90 TABLET | Refills: 3 | Status: SHIPPED | OUTPATIENT
Start: 2022-05-31 | End: 2023-04-13

## 2022-05-31 NOTE — TELEPHONE ENCOUNTER
[facility] is requesting a new Rx for Atorvastatin 20 Mg tablets as a 100-Day Supply to be sent to the pharmacy. Please advise if appropriate.

## 2022-06-23 ENCOUNTER — TELEPHONE (OUTPATIENT)
Dept: MEDICAL GROUP | Facility: MEDICAL CENTER | Age: 72
End: 2022-06-23
Payer: MEDICARE

## 2022-06-23 NOTE — TELEPHONE ENCOUNTER
Pt called stating the pain medication that was prescribed has not helped at all. She stated that she wakes up nightly in excruciating pain. She would like to know if you have any advice for her to control her discomfort.     Please advise.

## 2022-06-24 DIAGNOSIS — F41.8 SITUATIONAL ANXIETY: ICD-10-CM

## 2022-06-24 RX ORDER — ALPRAZOLAM 0.5 MG/1
TABLET ORAL
Qty: 60 TABLET | Refills: 2 | Status: SHIPPED | OUTPATIENT
Start: 2022-06-24 | End: 2022-09-29 | Stop reason: SDUPTHER

## 2022-06-29 RX ORDER — PANTOPRAZOLE SODIUM 40 MG/1
TABLET, DELAYED RELEASE ORAL
Qty: 90 TABLET | Refills: 3 | Status: SHIPPED | OUTPATIENT
Start: 2022-06-29 | End: 2022-07-29 | Stop reason: SDUPTHER

## 2022-06-29 NOTE — TELEPHONE ENCOUNTER
I assume she is referring to the celecoxib.  Unfortunately she will need to see the rheumatologist for further treatment.  The additional rheumatologic treatments require a specialist.  If she is still working with [facility]?

## 2022-06-29 NOTE — TELEPHONE ENCOUNTER
Pt has been informed. She stated she is still working with [facility] and has a scheduled appt.

## 2022-07-25 ENCOUNTER — HOSPITAL ENCOUNTER (OUTPATIENT)
Dept: LAB | Facility: MEDICAL CENTER | Age: 72
End: 2022-07-25
Attending: INTERNAL MEDICINE
Payer: MEDICARE

## 2022-07-25 LAB
APPEARANCE UR: CLEAR
BASOPHILS # BLD AUTO: 1.4 % (ref 0–1.8)
BASOPHILS # BLD: 0.09 K/UL (ref 0–0.12)
BILIRUB UR QL STRIP.AUTO: NEGATIVE
COLOR UR: YELLOW
CREAT UR-MCNC: 78.27 MG/DL
EOSINOPHIL # BLD AUTO: 0.25 K/UL (ref 0–0.51)
EOSINOPHIL NFR BLD: 4 % (ref 0–6.9)
ERYTHROCYTE [DISTWIDTH] IN BLOOD BY AUTOMATED COUNT: 41.8 FL (ref 35.9–50)
GLUCOSE UR STRIP.AUTO-MCNC: NEGATIVE MG/DL
HCT VFR BLD AUTO: 43.7 % (ref 37–47)
HGB BLD-MCNC: 15 G/DL (ref 12–16)
IMM GRANULOCYTES # BLD AUTO: 0.02 K/UL (ref 0–0.11)
IMM GRANULOCYTES NFR BLD AUTO: 0.3 % (ref 0–0.9)
KETONES UR STRIP.AUTO-MCNC: NEGATIVE MG/DL
LEUKOCYTE ESTERASE UR QL STRIP.AUTO: NEGATIVE
LYMPHOCYTES # BLD AUTO: 2.54 K/UL (ref 1–4.8)
LYMPHOCYTES NFR BLD: 40.5 % (ref 22–41)
MCH RBC QN AUTO: 31.1 PG (ref 27–33)
MCHC RBC AUTO-ENTMCNC: 34.3 G/DL (ref 33.6–35)
MCV RBC AUTO: 90.5 FL (ref 81.4–97.8)
MICRO URNS: NORMAL
MONOCYTES # BLD AUTO: 0.54 K/UL (ref 0–0.85)
MONOCYTES NFR BLD AUTO: 8.6 % (ref 0–13.4)
NEUTROPHILS # BLD AUTO: 2.83 K/UL (ref 2–7.15)
NEUTROPHILS NFR BLD: 45.2 % (ref 44–72)
NITRITE UR QL STRIP.AUTO: NEGATIVE
NRBC # BLD AUTO: 0 K/UL
NRBC BLD-RTO: 0 /100 WBC
PH UR STRIP.AUTO: 6.5 [PH] (ref 5–8)
PLATELET # BLD AUTO: 282 K/UL (ref 164–446)
PMV BLD AUTO: 10.2 FL (ref 9–12.9)
PROT UR QL STRIP: NEGATIVE MG/DL
PROT UR-MCNC: 5 MG/DL (ref 0–15)
PROT/CREAT UR: 64 MG/G (ref 10–107)
RBC # BLD AUTO: 4.83 M/UL (ref 4.2–5.4)
RBC UR QL AUTO: NEGATIVE
SP GR UR STRIP.AUTO: 1.01
UROBILINOGEN UR STRIP.AUTO-MCNC: 0.2 MG/DL
WBC # BLD AUTO: 6.3 K/UL (ref 4.8–10.8)

## 2022-07-25 PROCEDURE — 86376 MICROSOMAL ANTIBODY EACH: CPT

## 2022-07-25 PROCEDURE — 82565 ASSAY OF CREATININE: CPT

## 2022-07-25 PROCEDURE — 85025 COMPLETE CBC W/AUTO DIFF WBC: CPT

## 2022-07-25 PROCEDURE — 86381 MITOCHONDRIAL ANTIBODY EACH: CPT

## 2022-07-25 PROCEDURE — 86015 ACTIN ANTIBODY EACH: CPT

## 2022-07-25 PROCEDURE — 36415 COLL VENOUS BLD VENIPUNCTURE: CPT

## 2022-07-25 PROCEDURE — 86225 DNA ANTIBODY NATIVE: CPT

## 2022-07-25 PROCEDURE — 86160 COMPLEMENT ANTIGEN: CPT | Mod: 91

## 2022-07-25 PROCEDURE — 84156 ASSAY OF PROTEIN URINE: CPT

## 2022-07-25 PROCEDURE — 82570 ASSAY OF URINE CREATININE: CPT

## 2022-07-25 PROCEDURE — 86039 ANTINUCLEAR ANTIBODIES (ANA): CPT

## 2022-07-25 PROCEDURE — 80076 HEPATIC FUNCTION PANEL: CPT

## 2022-07-25 PROCEDURE — 86235 NUCLEAR ANTIGEN ANTIBODY: CPT | Mod: 91

## 2022-07-25 PROCEDURE — 82657 ENZYME CELL ACTIVITY: CPT

## 2022-07-25 PROCEDURE — 86038 ANTINUCLEAR ANTIBODIES: CPT

## 2022-07-25 PROCEDURE — 86256 FLUORESCENT ANTIBODY TITER: CPT

## 2022-07-25 PROCEDURE — 81003 URINALYSIS AUTO W/O SCOPE: CPT

## 2022-07-26 LAB
ALBUMIN SERPL BCP-MCNC: 4.6 G/DL (ref 3.2–4.9)
ALP SERPL-CCNC: 80 U/L (ref 30–99)
ALT SERPL-CCNC: 33 U/L (ref 2–50)
AST SERPL-CCNC: 37 U/L (ref 12–45)
BILIRUB CONJ SERPL-MCNC: <0.2 MG/DL (ref 0.1–0.5)
BILIRUB INDIRECT SERPL-MCNC: NORMAL MG/DL (ref 0–1)
BILIRUB SERPL-MCNC: 0.4 MG/DL (ref 0.1–1.5)
C3 SERPL-MCNC: 115.8 MG/DL (ref 87–200)
C4 SERPL-MCNC: 20.8 MG/DL (ref 19–52)
CREAT SERPL-MCNC: 0.73 MG/DL (ref 0.5–1.4)
GFR SERPLBLD CREATININE-BSD FMLA CKD-EPI: 89 ML/MIN/1.73 M 2
PROT SERPL-MCNC: 8.1 G/DL (ref 6–8.2)

## 2022-07-27 LAB
DSDNA AB TITR SER CLIF: 312 IU (ref 0–24)
LKM AB TITR SER IF: NORMAL {TITER}
MITOCHONDRIA M2 IGG SER-ACNC: 12.5 UNITS (ref 0–24.9)
NUCLEAR IGG SER QL IA: DETECTED

## 2022-07-28 ENCOUNTER — TELEPHONE (OUTPATIENT)
Dept: MEDICAL GROUP | Facility: MEDICAL CENTER | Age: 72
End: 2022-07-28
Payer: MEDICARE

## 2022-07-28 LAB
ENA SM IGG SER-ACNC: 0 AU/ML (ref 0–40)
ENA SS-B IGG SER IA-ACNC: 1 AU/ML (ref 0–40)
SMA IGG SER-ACNC: 53 UNITS (ref 0–19)
SMOOTH MUSCLE IGG TITR SER: ABNORMAL {TITER}
SSA52 R0ENA AB IGG Q0420: 1 AU/ML (ref 0–40)
SSA60 R0ENA AB IGG Q0419: 2 AU/ML (ref 0–40)
TPMT BLD-CCNC: 28.5 U/ML (ref 24–44)
U1 SNRNP IGG SER QL: 9 UNITS (ref 0–19)

## 2022-07-28 NOTE — TELEPHONE ENCOUNTER
ESTABLISHED PATIENT PRE-VISIT PLANNING     Phone Number Called: [phone] (home)   Call outcome: Spoke to patient regarding message below.  Message: Appointment scheduled with Dr. De Jesus,  Friday, 07/29/2022, check-in: 8:45 AM, [address], [phone].      Patient was contacted to complete PVP.     Note: Patient will not be contacted if there is no indication to call.     1.  Reviewed notes from the last few office visits within the medical group: Yes    2.  If any orders were placed at last visit or intended to be done for this visit (i.e. 6 mos follow-up), do we have Results/Consult Notes?         •  Labs - Labs were not ordered at last office visit.   Last office visit with Dr. De Jesus was on 05/16/2022.  Note: If patient appointment is for lab review and patient did not complete labs, check with provider if OK to reschedule patient until labs completed.       •  Imaging - Imaging ordered, NOT completed. Patient advised to complete prior to next appointment.    -MAMMO: Not Done         •  Referrals - Referral ordered, patient was seen and consult notes are in chart. Care Teams updated  YES.      -Rheumatology: Please reference Consultation Notes by Provider Roseanna Rothman of [facility], encounter date: 07/20/2022.     3. Is this appointment scheduled as a Hospital Follow-Up? No    4.  Immunizations were updated in Epic using Reconcile Outside Information activity? Yes    5.  Patient is due for the following Health Maintenance Topics:   Health Maintenance Due   Topic Date Due   • IMM HEP B VACCINE (1 of 3 - Risk 3-dose series) Never done   • COVID-19 Vaccine (3 - Booster for Pfizer series) 09/16/2021   • MAMMOGRAM  02/19/2022       6.  AHA (Pulse8) form printed for Provider? No, patient does not have any open alerts COMPLIANT

## 2022-07-29 ENCOUNTER — OFFICE VISIT (OUTPATIENT)
Dept: MEDICAL GROUP | Facility: MEDICAL CENTER | Age: 72
End: 2022-07-29
Payer: MEDICARE

## 2022-07-29 VITALS
TEMPERATURE: 97.8 F | RESPIRATION RATE: 16 BRPM | DIASTOLIC BLOOD PRESSURE: 72 MMHG | SYSTOLIC BLOOD PRESSURE: 114 MMHG | BODY MASS INDEX: 24.29 KG/M2 | HEIGHT: 62 IN | HEART RATE: 64 BPM | WEIGHT: 132 LBS | OXYGEN SATURATION: 96 %

## 2022-07-29 DIAGNOSIS — I51.89 LEFT VENTRICULAR HYPOKINESIS: ICD-10-CM

## 2022-07-29 DIAGNOSIS — L85.8 CUTANEOUS HORN: ICD-10-CM

## 2022-07-29 DIAGNOSIS — K75.4 CHRONIC AUTOIMMUNE HEPATITIS (HCC): ICD-10-CM

## 2022-07-29 DIAGNOSIS — K29.30 CHRONIC SUPERFICIAL GASTRITIS WITHOUT BLEEDING: ICD-10-CM

## 2022-07-29 DIAGNOSIS — R10.13 EPIGASTRIC ABDOMINAL PAIN: ICD-10-CM

## 2022-07-29 DIAGNOSIS — Z12.31 ENCOUNTER FOR SCREENING MAMMOGRAM FOR BREAST CANCER: ICD-10-CM

## 2022-07-29 DIAGNOSIS — I51.81 STRESS-INDUCED CARDIOMYOPATHY: ICD-10-CM

## 2022-07-29 DIAGNOSIS — K74.60 CIRRHOSIS, NON-ALCOHOLIC (HCC): ICD-10-CM

## 2022-07-29 DIAGNOSIS — Z23 NEED FOR HEPATITIS B VACCINATION: ICD-10-CM

## 2022-07-29 DIAGNOSIS — K22.70 BARRETT'S ESOPHAGUS WITHOUT DYSPLASIA: ICD-10-CM

## 2022-07-29 DIAGNOSIS — R06.02 EXERTIONAL SHORTNESS OF BREATH: ICD-10-CM

## 2022-07-29 PROBLEM — R93.2 ABNORMAL ULTRASOUND OF LIVER: Status: RESOLVED | Noted: 2021-05-13 | Resolved: 2022-07-29

## 2022-07-29 LAB — DSDNA IGG TITR SER CLIF: ABNORMAL {TITER}

## 2022-07-29 PROCEDURE — G0010 ADMIN HEPATITIS B VACCINE: HCPCS | Performed by: FAMILY MEDICINE

## 2022-07-29 PROCEDURE — 99214 OFFICE O/P EST MOD 30 MIN: CPT | Mod: 25 | Performed by: FAMILY MEDICINE

## 2022-07-29 PROCEDURE — 90746 HEPB VACCINE 3 DOSE ADULT IM: CPT | Performed by: FAMILY MEDICINE

## 2022-07-29 RX ORDER — PANTOPRAZOLE SODIUM 40 MG/1
40 TABLET, DELAYED RELEASE ORAL
Qty: 90 TABLET | Refills: 3 | Status: SHIPPED | OUTPATIENT
Start: 2022-07-29 | End: 2023-06-14 | Stop reason: SDUPTHER

## 2022-07-29 ASSESSMENT — FIBROSIS 4 INDEX: FIB4 SCORE: 1.58

## 2022-07-29 NOTE — PROGRESS NOTES
Chief Complaint   Patient presents with   • Other     Growth on chest concern    • Hepatitis   • Heart Problem   • GI Problem       Subjective:     HPI:   Karen Dimas presents today with the followin. Chronic autoimmune hepatitis (HCC)/Cirrhosis, non-alcoholic (HCC)  Patient has cirrhosis and chronic autoimmune hepatitis.  She needs to follow with her GI physician and start treatment.  She has also seen rheumatology but rheumatology does not feel she actually has lupus causing this problem at this time.  We will start hepatitis B vaccine series here today.  She is TB negative.  She is both hep B surface antigen and hep C antibody negative with an AFP in the normal range.    2. Stress-induced cardiomyopathy/Left ventricular hypokinesis/Exertional shortness of breath  Patient has history of stress-induced cardiomyopathy with left ventricular hypokinesis on echocardiogram in 2014.  She has not had follow-up imaging.  She is noticing significant exertional shortness of breath.  Echocardiogram order discussed and placed.    3. Encounter for screening mammogram for breast cancer  Strongly recommended mammogram completion.  Order discussed and placed.    4. Need for hepatitis B vaccination  Hepatitis B vaccine #1 administered today    5. Epigastric abdominal pain/Chronic superficial gastritis without bleeding/Gómez's esophagus without dysplasia  Patient continues to have episodic quite severe epigastric abdominal pain.  She is taking the famotidine twice daily.  This does not seem to be changing that pain.  The pantoprazole that I prescribed at the last visit she did not  or start.  She misunderstood what this was for.  She will start taking it now and continue the famotidine.    6. Cutaneous horn  The growth on her left anterior chest is a small cutaneous horn.  This is treated with liquid nitrogen here today.  If that does not resolve we will refer her to dermatology.        Patient Active  Problem List    Diagnosis Date Noted   • Cirrhosis, non-alcoholic (HCC) 07/29/2022   • Left ventricular hypokinesis 07/29/2022   • Gómez's esophagus without dysplasia 07/29/2022   • Cutaneous horn 07/29/2022   • Chronic autoimmune hepatitis (HCC) 05/16/2022   • Ds DNA antibody positive 05/16/2022   • Elevated sedimentation rate 04/20/2022   • 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   • Autoimmune disease (HCC) 04/20/2022   • Pain of upper abdomen 04/20/2022   • Aortic atherosclerosis (HCC) 03/01/2022   • Myofascial pain on left side 11/30/2021   • Muscle tenderness 11/30/2021   • Other chest pain 11/11/2021   • History of 2019 novel coronavirus disease (COVID-19) 11/11/2021   • BMI 26.0-26.9,adult 08/24/2021   • Mild episode of recurrent major depressive disorder (HCC) 08/24/2021   • History of Gómez's esophagus 05/13/2021   • Bilateral radicular pain 01/20/2021   • Muscle spasm 01/20/2021   • Mixed hyperlipidemia 01/20/2021   • Vitamin D deficiency 01/20/2021   • Intervertebral cervical disc disorder with myelopathy, cervical region 11/29/2017   • Transaminitis 12/19/2016   • Hyperglobulinemia 12/19/2016   • Osteoporosis 12/18/2016   • Weakness 07/31/2015   • Stress-induced cardiomyopathy 09/30/2014   • Situational anxiety 09/29/2014   • Microscopic colitis 09/28/2014   • Essential hypertension 09/26/2014   • Insomnia 09/26/2014   • Gastritis 09/25/2014       Current medicines (including changes today)  Current Outpatient Medications   Medication Sig Dispense Refill   • pantoprazole (PROTONIX) 40 MG Tablet Delayed Response Take 1 Tablet by mouth every day. 90 Tablet 3   • ALPRAZolam (XANAX) 0.5 MG Tab TAKE 1 TABLET BY MOUTH 2 TIMES A DAY AS NEEDED FOR SLEEP OR ANXIETY FOR UP TO 90 DAYS 30DS F41.8* 60 Tablet 2   • atorvastatin (LIPITOR) 20 MG Tab Take 1 Tablet by mouth at bedtime. 90 Tablet 3   • hydroxychloroquine (PLAQUENIL) 200 MG Tab Take 1 Tablet by mouth every day. 30 Tablet 6   • famotidine (PEPCID) 20 MG Tab Take 1  "Tablet by mouth 2 times a day. 180 Tablet 2   • amLODIPine (NORVASC) 5 MG Tab Take 1 Tablet by mouth every day. 90 Tablet 3   • Vitamin E 268 MG (400 UNIT) Cap Take  by mouth every day.     • Ascorbic Acid (VITAMIN C) 1000 MG Tab Take 2,000 mg by mouth every day.     • Multiple Vitamin Tab Take  by mouth every day.     • losartan (COZAAR) 100 MG Tab Take 1 Tablet by mouth every day. 90 Tablet 3   • aluminum chloride (DRYSOL) 20 % external solution Apply to axillary regions nightly (Patient taking differently: at bedtime as needed. Apply to axillary regions nightly) 60 mL 4   • Cholecalciferol (VITAMIN D3) 2000 UNIT Cap Take  by mouth every day. Patient takes 1,000 units a day       No current facility-administered medications for this visit.       Allergies   Allergen Reactions   • Codeine Vomiting     [id number]   • Cefdinir      Rash   • Hydrocodone Vomiting   • Oxycodone Vomiting   • Tramadol Vomiting     itching       ROS: As per HPI       Objective:     /72   Pulse 64   Temp 36.6 °C (97.8 °F)   Resp 16   Ht 1.575 m (5' 2\")   Wt 59.9 kg (132 lb)   SpO2 96%  Body mass index is 24.14 kg/m².    Physical Exam:  Constitutional: Well-developed and well-nourished. Not diaphoretic. No distress. Lucid and fluent. Patient, APRN, physician and staff all wearing masks.  Skin: Skin is warm and dry. No rash noted.  Has a small cutaneous horn on her anterior chest.  Head: Atraumatic without lesions.  Eyes: Conjunctivae and extraocular motions are normal. Pupils are equal, round, and reactive to light. No scleral icterus.   Ears:  External ears unremarkable.   Neck: Supple, trachea midline. No thyromegaly present. No cervical or supraclavicular lymphadenopathy. No JVD or carotid bruits appreciated  Cardiovascular: Regular rate and rhythm.  Normal S1, S2 without murmur appreciated.  Chest: Effort normal. Clear to auscultation throughout. No adventitious sounds.   Abdomen: Soft, non tender, and without distention. " Active bowel sounds in all four quadrants. No rebound, guarding, masses or hepatosplenomegaly.  Extremities: No cyanosis, clubbing, erythema, nor edema.   Neurological: Alert and oriented x 3.  Psychiatric:  Behavior, mood, and affect are appropriate.       Assessment and Plan:     71 y.o. female with the following issues:    1. Chronic autoimmune hepatitis (HCC)  Hepatitis B Vaccine Adult IM   2. Cirrhosis, non-alcoholic (HCC)  Hepatitis B Vaccine Adult IM   3. Stress-induced cardiomyopathy  EC-ECHOCARDIOGRAM COMPLETE W/O CONT   4. Left ventricular hypokinesis  EC-ECHOCARDIOGRAM COMPLETE W/O CONT   5. Exertional shortness of breath  EC-ECHOCARDIOGRAM COMPLETE W/O CONT   6. Encounter for screening mammogram for breast cancer  MA-SCREENING MAMMO BILAT W/TOMOSYNTHESIS W/CAD   7. Need for hepatitis B vaccination  Hepatitis B Vaccine Adult IM   8. Epigastric abdominal pain     9. Chronic superficial gastritis without bleeding  pantoprazole (PROTONIX) 40 MG Tablet Delayed Response   10. Gómez's esophagus without dysplasia  pantoprazole (PROTONIX) 40 MG Tablet Delayed Response   11. Cutaneous horn           Followup: Return in about 3 months (around 10/29/2022), or if symptoms worsen or fail to improve.

## 2022-07-29 NOTE — PATIENT INSTRUCTIONS
Add the pantoprazole once daily.  If no improvement in stomach symptoms after a week send me a message please.      You do have autoimmune hepatitis.  Glad you are going to see Dr. Salvador.    Your first hepatitis B vaccine was given today.     You have autoimmune hepatitis, cells at the interface

## 2022-07-30 LAB
ANA INTERPRETIVE COMMENT Q5143: ABNORMAL
ANA PATTERN Q5144: ABNORMAL
ANA TITER Q5145: ABNORMAL
ANTINUCLEAR ANTIBODY (ANA), HEP-2, IGG Q5142: DETECTED
CYTOPLASMIC PATTERN TITER Q5148: ABNORMAL
ENA JO1 AB TITR SER: 0 AU/ML (ref 0–40)
ENA SCL70 IGG SER QL: 3 AU/ML (ref 0–40)

## 2022-08-16 ENCOUNTER — HOSPICE ADMISSION (OUTPATIENT)
Dept: HOSPICE | Facility: HOSPICE | Age: 72
End: 2022-08-16

## 2022-08-17 ENCOUNTER — HOME CARE VISIT (OUTPATIENT)
Dept: HOSPICE | Facility: HOSPICE | Age: 72
End: 2022-08-17

## 2022-09-29 ENCOUNTER — OFFICE VISIT (OUTPATIENT)
Dept: MEDICAL GROUP | Facility: MEDICAL CENTER | Age: 72
End: 2022-09-29
Payer: MEDICARE

## 2022-09-29 VITALS
RESPIRATION RATE: 14 BRPM | WEIGHT: 136.69 LBS | HEART RATE: 78 BPM | HEIGHT: 62 IN | DIASTOLIC BLOOD PRESSURE: 78 MMHG | BODY MASS INDEX: 25.15 KG/M2 | SYSTOLIC BLOOD PRESSURE: 142 MMHG | OXYGEN SATURATION: 96 % | TEMPERATURE: 97.4 F

## 2022-09-29 DIAGNOSIS — Z23 NEED FOR IMMUNIZATION AGAINST INFLUENZA: ICD-10-CM

## 2022-09-29 DIAGNOSIS — K22.70 BARRETT'S ESOPHAGUS WITHOUT DYSPLASIA: ICD-10-CM

## 2022-09-29 DIAGNOSIS — F41.8 SITUATIONAL ANXIETY: ICD-10-CM

## 2022-09-29 DIAGNOSIS — K75.4 CHRONIC AUTOIMMUNE HEPATITIS (HCC): ICD-10-CM

## 2022-09-29 DIAGNOSIS — Z23 NEED FOR HEPATITIS B VACCINATION: ICD-10-CM

## 2022-09-29 DIAGNOSIS — K74.60 CIRRHOSIS, NON-ALCOHOLIC (HCC): ICD-10-CM

## 2022-09-29 DIAGNOSIS — K74.02 STAGE 3 HEPATIC FIBROSIS: ICD-10-CM

## 2022-09-29 DIAGNOSIS — F33.0 MILD EPISODE OF RECURRENT MAJOR DEPRESSIVE DISORDER (HCC): ICD-10-CM

## 2022-09-29 DIAGNOSIS — R76.8 ANA POSITIVE: ICD-10-CM

## 2022-09-29 DIAGNOSIS — M35.9 AUTOIMMUNE DISEASE (HCC): ICD-10-CM

## 2022-09-29 PROBLEM — R10.10 PAIN OF UPPER ABDOMEN: Status: RESOLVED | Noted: 2022-04-20 | Resolved: 2022-09-29

## 2022-09-29 PROBLEM — R07.89 OTHER CHEST PAIN: Status: RESOLVED | Noted: 2021-11-11 | Resolved: 2022-09-29

## 2022-09-29 PROCEDURE — 99214 OFFICE O/P EST MOD 30 MIN: CPT | Mod: 25 | Performed by: FAMILY MEDICINE

## 2022-09-29 PROCEDURE — G0010 ADMIN HEPATITIS B VACCINE: HCPCS | Performed by: FAMILY MEDICINE

## 2022-09-29 PROCEDURE — G0008 ADMIN INFLUENZA VIRUS VAC: HCPCS | Performed by: FAMILY MEDICINE

## 2022-09-29 PROCEDURE — 90662 IIV NO PRSV INCREASED AG IM: CPT | Performed by: FAMILY MEDICINE

## 2022-09-29 PROCEDURE — 90746 HEPB VACCINE 3 DOSE ADULT IM: CPT | Performed by: FAMILY MEDICINE

## 2022-09-29 RX ORDER — AZATHIOPRINE 50 MG/1
50 TABLET ORAL 2 TIMES DAILY
COMMUNITY
Start: 2022-08-31

## 2022-09-29 RX ORDER — ALPRAZOLAM 0.5 MG/1
0.5 TABLET ORAL 2 TIMES DAILY PRN
Qty: 60 TABLET | Refills: 2 | Status: SHIPPED | OUTPATIENT
Start: 2022-09-29 | End: 2022-12-16

## 2022-09-29 RX ORDER — HYDROXYCHLOROQUINE SULFATE 200 MG/1
200 TABLET, FILM COATED ORAL DAILY
Qty: 90 TABLET | Refills: 3 | Status: SHIPPED | OUTPATIENT
Start: 2022-09-29 | End: 2022-12-16

## 2022-09-29 RX ORDER — CELECOXIB 200 MG/1
CAPSULE ORAL
COMMUNITY
Start: 2022-09-22 | End: 2022-09-29

## 2022-09-29 ASSESSMENT — FIBROSIS 4 INDEX: FIB4 SCORE: 1.58

## 2022-09-29 NOTE — PROGRESS NOTES
PDMP reveiew shows no inconsistencies.  Patient is using the medication for sleep and anxiety.  Has been helpful to her.  Renewed.

## 2022-09-29 NOTE — PROGRESS NOTES
Chief Complaint   Patient presents with    Immunizations     Hep b    Hepatic Disease       Subjective:     HPI:   Karen Dimas presents today with the followin. Need for hepatitis B vaccination  This is her second hep B     2. Need for immunization against influenza  HD flu vaccine administered today    3. JARRETT positive/Autoimmune disease (HCC)/Chronic autoimmune hepatitis (HCC)  She has autoimmune hepatitis.  She is now on imuran and hydroxychloroquine.  She is awaiting a follow up appointment with Dr. Salvador.    4. Gómez's esophagus without dysplasia  She is on famotidine and pantoprazole.      5. Cirrhosis, non-alcoholic (HCC)/Stage 3 hepatic fibrosis  She has stage 3 cirrhosis by biopsy.  She avoids all alcohol and liver toxins.  Needs to see GI again.        Patient Active Problem List    Diagnosis Date Noted    Cirrhosis, non-alcoholic (HCC) 2022    Left ventricular hypokinesis 2022    Gómez's esophagus without dysplasia 2022    Cutaneous horn 2022    Chronic autoimmune hepatitis (HCC) 2022    Ds DNA antibody positive 2022    Elevated sedimentation rate 2022    JARRETT positive 2022    Autoimmune disease (HCC) 2022    Aortic atherosclerosis (HCC) 2022    Myofascial pain on left side 2021    Muscle tenderness 2021    History of 2019 novel coronavirus disease (COVID-19) 2021    BMI 26.0-26.9,adult 2021    Mild episode of recurrent major depressive disorder (HCC) 2021    History of Gómez's esophagus 2021    Bilateral radicular pain 2021    Muscle spasm 2021    Mixed hyperlipidemia 2021    Vitamin D deficiency 2021    Intervertebral cervical disc disorder with myelopathy, cervical region 2017    Transaminitis 2016    Hyperglobulinemia 2016    Osteoporosis 2016    Weakness 2015    Stress-induced cardiomyopathy 2014    Situational anxiety  "09/29/2014    Microscopic colitis 09/28/2014    Essential hypertension 09/26/2014    Insomnia 09/26/2014    Gastritis 09/25/2014       Current medicines (including changes today)  Current Outpatient Medications   Medication Sig Dispense Refill    hydroxychloroquine (PLAQUENIL) 200 MG Tab Take 1 Tablet by mouth every day. 90 Tablet 3    pantoprazole (PROTONIX) 40 MG Tablet Delayed Response Take 1 Tablet by mouth every day. 90 Tablet 3    atorvastatin (LIPITOR) 20 MG Tab Take 1 Tablet by mouth at bedtime. 90 Tablet 3    famotidine (PEPCID) 20 MG Tab Take 1 Tablet by mouth 2 times a day. 180 Tablet 2    amLODIPine (NORVASC) 5 MG Tab Take 1 Tablet by mouth every day. 90 Tablet 3    Vitamin E 268 MG (400 UNIT) Cap Take  by mouth every day.      Ascorbic Acid (VITAMIN C) 1000 MG Tab Take 2,000 mg by mouth every day.      Multiple Vitamin Tab Take  by mouth every day.      losartan (COZAAR) 100 MG Tab Take 1 Tablet by mouth every day. 90 Tablet 3    aluminum chloride (DRYSOL) 20 % external solution Apply to axillary regions nightly (Patient taking differently: at bedtime as needed. Apply to axillary regions nightly) 60 mL 4    Cholecalciferol (VITAMIN D3) 2000 UNIT Cap Take  by mouth every day. Patient takes 1,000 units a day      azaTHIOprine (IMURAN) 50 MG Tab Take 50 mg by mouth 2 times a day.       No current facility-administered medications for this visit.       Allergies   Allergen Reactions    Codeine Vomiting     [id number]    Cefdinir      Rash    Hydrocodone Vomiting    Oxycodone Vomiting    Tramadol Vomiting     itching       ROS: As per HPI       Objective:     BP (!) 142/78   Pulse 78   Temp 36.3 °C (97.4 °F) (Temporal)   Resp 14   Ht 1.575 m (5' 2\")   Wt 62 kg (136 lb 11 oz)   SpO2 96%  Body mass index is 25 kg/m².    Physical Exam:  Constitutional: Well-developed and well-nourished. Not diaphoretic. No distress. Lucid and fluent.  Patient, physician and staff all wearing masks.  Skin: Skin is warm and " dry. No rash noted.  Head: Atraumatic without lesions.  Eyes: Conjunctivae and extraocular motions are normal. Pupils are equal, round, and reactive to light. No scleral icterus.   Ears:  External ears unremarkable.   Neck: Supple, trachea midline. No thyromegaly present. No cervical or supraclavicular lymphadenopathy. No JVD or carotid bruits appreciated  Cardiovascular: Regular rate and rhythm.  Normal S1, S2 without murmur appreciated.  Chest: Effort normal. Clear to auscultation throughout. No adventitious sounds.   Abdomen: Soft, non tender, and without distention. Active bowel sounds in all four quadrants. No rebound, guarding, masses or hepatosplenomegaly.  No ascites appreciated.  Extremities: No cyanosis, clubbing, erythema, nor edema.   Neurological: Alert and oriented x 3. No termor appreciated.  Psychiatric:  Behavior, mood, and affect are appropriate.       Assessment and Plan:     71 y.o. female with the following issues:    1. Need for hepatitis B vaccination  Hepatitis B Vaccine Adult IM      2. Need for immunization against influenza  Influenza Vaccine, High Dose (65+ Only)      3. JARRETT positive  hydroxychloroquine (PLAQUENIL) 200 MG Tab      4. Autoimmune disease (HCC)  hydroxychloroquine (PLAQUENIL) 200 MG Tab      5. Gómez's esophagus without dysplasia        6. Chronic autoimmune hepatitis (HCC)  Referral to Gastroenterology      7. Cirrhosis, non-alcoholic (HCC)  Referral to Gastroenterology      8. Stage 3 hepatic fibrosis  Referral to Gastroenterology      9. Mild episode of recurrent major depressive disorder (HCC)              Followup: Return in about 4 months (around 1/29/2023), or if symptoms worsen or fail to improve.

## 2022-10-04 ENCOUNTER — TELEPHONE (OUTPATIENT)
Dept: MEDICAL GROUP | Facility: MEDICAL CENTER | Age: 72
End: 2022-10-04
Payer: MEDICARE

## 2022-10-26 ENCOUNTER — HOSPITAL ENCOUNTER (OUTPATIENT)
Dept: RADIOLOGY | Facility: MEDICAL CENTER | Age: 72
End: 2022-10-26
Attending: FAMILY MEDICINE
Payer: MEDICARE

## 2022-10-26 ENCOUNTER — TELEPHONE (OUTPATIENT)
Dept: HEALTH INFORMATION MANAGEMENT | Facility: OTHER | Age: 72
End: 2022-10-26
Payer: MEDICARE

## 2022-10-26 DIAGNOSIS — Z12.31 ENCOUNTER FOR SCREENING MAMMOGRAM FOR BREAST CANCER: ICD-10-CM

## 2022-10-26 PROCEDURE — 77063 BREAST TOMOSYNTHESIS BI: CPT

## 2022-10-30 DIAGNOSIS — I10 ESSENTIAL HYPERTENSION: ICD-10-CM

## 2022-10-30 RX ORDER — LOSARTAN POTASSIUM 100 MG/1
100 TABLET ORAL DAILY
Qty: 100 TABLET | Refills: 3 | Status: SHIPPED | OUTPATIENT
Start: 2022-10-30 | End: 2023-12-14 | Stop reason: SDUPTHER

## 2022-11-10 ENCOUNTER — HOSPITAL ENCOUNTER (OUTPATIENT)
Dept: LAB | Facility: MEDICAL CENTER | Age: 72
End: 2022-11-10
Attending: INTERNAL MEDICINE
Payer: MEDICARE

## 2022-11-10 PROCEDURE — 82657 ENZYME CELL ACTIVITY: CPT

## 2022-11-10 PROCEDURE — 36415 COLL VENOUS BLD VENIPUNCTURE: CPT

## 2022-11-14 LAB — TPMT BLD-CCNC: 31.9 U/ML (ref 24–44)

## 2022-11-29 ENCOUNTER — HOSPITAL ENCOUNTER (OUTPATIENT)
Dept: RADIOLOGY | Facility: MEDICAL CENTER | Age: 72
End: 2022-11-29
Attending: INTERNAL MEDICINE
Payer: MEDICARE

## 2022-11-29 DIAGNOSIS — K75.4 AUTOIMMUNE HEPATITIS (HCC): ICD-10-CM

## 2022-11-29 DIAGNOSIS — K74.69 CIRRHOSIS, CRYPTOGENIC (HCC): ICD-10-CM

## 2022-11-29 DIAGNOSIS — R94.5 NONSPECIFIC ABNORMAL RESULTS OF LIVER FUNCTION STUDY: ICD-10-CM

## 2022-11-29 DIAGNOSIS — K22.70 BARRETT'S ESOPHAGUS WITHOUT DYSPLASIA: ICD-10-CM

## 2022-11-29 PROCEDURE — 76705 ECHO EXAM OF ABDOMEN: CPT

## 2022-12-12 ENCOUNTER — HOSPITAL ENCOUNTER (OUTPATIENT)
Dept: CARDIOLOGY | Facility: MEDICAL CENTER | Age: 72
End: 2022-12-12
Attending: FAMILY MEDICINE
Payer: MEDICARE

## 2022-12-12 DIAGNOSIS — R06.02 EXERTIONAL SHORTNESS OF BREATH: ICD-10-CM

## 2022-12-12 DIAGNOSIS — I51.89 LEFT VENTRICULAR HYPOKINESIS: ICD-10-CM

## 2022-12-12 DIAGNOSIS — I51.81 STRESS-INDUCED CARDIOMYOPATHY: ICD-10-CM

## 2022-12-12 LAB
LV EJECT FRACT MOD 2C 99903: 70.75
LV EJECT FRACT MOD 4C 99902: 67.56
LV EJECT FRACT MOD BP 99901: 68.31

## 2022-12-12 PROCEDURE — 93306 TTE W/DOPPLER COMPLETE: CPT | Mod: 26 | Performed by: STUDENT IN AN ORGANIZED HEALTH CARE EDUCATION/TRAINING PROGRAM

## 2022-12-12 PROCEDURE — 93306 TTE W/DOPPLER COMPLETE: CPT

## 2022-12-16 ENCOUNTER — OFFICE VISIT (OUTPATIENT)
Dept: MEDICAL GROUP | Facility: MEDICAL CENTER | Age: 72
End: 2022-12-16
Payer: MEDICARE

## 2022-12-16 VITALS
BODY MASS INDEX: 24.62 KG/M2 | HEART RATE: 92 BPM | SYSTOLIC BLOOD PRESSURE: 144 MMHG | HEIGHT: 62 IN | DIASTOLIC BLOOD PRESSURE: 78 MMHG | TEMPERATURE: 97.5 F | OXYGEN SATURATION: 96 % | WEIGHT: 133.8 LBS

## 2022-12-16 DIAGNOSIS — L29.9 ITCHING: ICD-10-CM

## 2022-12-16 DIAGNOSIS — R76.8 ANA POSITIVE: ICD-10-CM

## 2022-12-16 DIAGNOSIS — F41.8 SITUATIONAL ANXIETY: ICD-10-CM

## 2022-12-16 DIAGNOSIS — M54.2 POSTERIOR NECK PAIN: ICD-10-CM

## 2022-12-16 DIAGNOSIS — M35.9 AUTOIMMUNE DISEASE (HCC): ICD-10-CM

## 2022-12-16 PROCEDURE — 99213 OFFICE O/P EST LOW 20 MIN: CPT | Performed by: FAMILY MEDICINE

## 2022-12-16 RX ORDER — ALPRAZOLAM 0.5 MG/1
0.5 TABLET ORAL 3 TIMES DAILY PRN
Qty: 90 TABLET | Refills: 2 | Status: SHIPPED | OUTPATIENT
Start: 2022-12-16 | End: 2023-04-13

## 2022-12-16 RX ORDER — HYDROXYCHLOROQUINE SULFATE 300 MG/1
200 TABLET ORAL DAILY
Qty: 90 TABLET | Refills: 3 | Status: SHIPPED | OUTPATIENT
Start: 2022-12-16 | End: 2023-06-14

## 2022-12-16 ASSESSMENT — FIBROSIS 4 INDEX: FIB4 SCORE: 1.6

## 2022-12-17 NOTE — PROGRESS NOTES
Chief Complaint   Patient presents with    Results    Neck Pain     Pt reports back of neck/head pain x2m. Pt states the pain wraps around the left side of the head and causes her left arm to go numb.     Rib Pain     Pt states she has had bilateral rib pain x2m.     Itching     Pt reports all over body itch x1m. Pt is unsure if it is from a new medication. Pt denies changing soaps, lotions, or creams.        Subjective:     HPI:   Karen Dimas presents today with the followin. JARRETT positive/Autoimmune disease (HCC)  Recent labs from her rheumatologist were reviewed.  She is highly JARRETT positive.  I believe this is the source of autoimmune hepatitis though there are subtleties that I do not appreciate.  She is on steroids in addition to hydroxychloroquine and Imuran.  They are not controlling her symptoms.  She will call rheumatology and discuss this further.  I have increased the hydroxychloroquine to 300 mg and have urged her to see an eye specialist soon.    2. Situational anxiety  Patient states the alprazolam is helpful for her anxiety.  That prednisone does seem to be making this somewhat worse.  She would like to increase the alprazolam to 3 a day if possible.  I think that is not unreasonable and I have made that change.  PDMP review shows no inconsistencies.  Patient reports no problems with the medication.  She denies sedation.    3. Posterior neck pain  Patient has tenderness at the occipital insertions of her muscles.  She also has costochondral tenderness and rib tenderness to palpation.  I believe this is part of her underlying inflammatory issue.  She will discuss this further with rheumatology.    4. Itching  Patient is having overall itching.  The Benadryl or Atarax does not significantly help this.  I believe this is because of her liver disease.  She will have a follow-up with GI for an endoscopy in about a month.       Patient Active Problem List    Diagnosis Date Noted    Posterior  neck pain 12/16/2022    Itching 12/16/2022    Cirrhosis, non-alcoholic (HCC) 07/29/2022    Left ventricular hypokinesis 07/29/2022    Gómez's esophagus without dysplasia 07/29/2022    Cutaneous horn 07/29/2022    Chronic autoimmune hepatitis (HCC) 05/16/2022    Ds DNA antibody positive 05/16/2022    Elevated sedimentation rate 04/20/2022    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    Autoimmune disease (HCC) 04/20/2022    Aortic atherosclerosis (HCC) 03/01/2022    Myofascial pain on left side 11/30/2021    Muscle tenderness 11/30/2021    History of 2019 novel coronavirus disease (COVID-19) 11/11/2021    BMI 26.0-26.9,adult 08/24/2021    Mild episode of recurrent major depressive disorder (HCC) 08/24/2021    History of Gómez's esophagus 05/13/2021    Bilateral radicular pain 01/20/2021    Muscle spasm 01/20/2021    Mixed hyperlipidemia 01/20/2021    Vitamin D deficiency 01/20/2021    Intervertebral cervical disc disorder with myelopathy, cervical region 11/29/2017    Transaminitis 12/19/2016    Hyperglobulinemia 12/19/2016    Osteoporosis 12/18/2016    Weakness 07/31/2015    Stress-induced cardiomyopathy 09/30/2014    Situational anxiety 09/29/2014    Microscopic colitis 09/28/2014    Essential hypertension 09/26/2014    Insomnia 09/26/2014    Gastritis 09/25/2014       Current medicines (including changes today)  Current Outpatient Medications   Medication Sig Dispense Refill    hydroxychloroquine 300 MG Tab Take 200 mg by mouth every day. 90 Tablet 3    ALPRAZolam (XANAX) 0.5 MG Tab Take 1 Tablet by mouth 3 times a day as needed for Sleep or Anxiety for up to 90 days. 90 tablets is a 30 day quantity 90 Tablet 2    losartan (COZAAR) 100 MG Tab TAKE 1 TABLET BY MOUTH EVERY  Tablet 3    azaTHIOprine (IMURAN) 50 MG Tab Take 50 mg by mouth 2 times a day.      pantoprazole (PROTONIX) 40 MG Tablet Delayed Response Take 1 Tablet by mouth every day. 90 Tablet 3    atorvastatin (LIPITOR) 20 MG Tab Take 1 Tablet by mouth at  "bedtime. 90 Tablet 3    famotidine (PEPCID) 20 MG Tab Take 1 Tablet by mouth 2 times a day. 180 Tablet 2    amLODIPine (NORVASC) 5 MG Tab Take 1 Tablet by mouth every day. 90 Tablet 3    Vitamin E 268 MG (400 UNIT) Cap Take  by mouth every day.      Ascorbic Acid (VITAMIN C) 1000 MG Tab Take 2,000 mg by mouth every day.      Multiple Vitamin Tab Take  by mouth every day.      aluminum chloride (DRYSOL) 20 % external solution Apply to axillary regions nightly (Patient taking differently: at bedtime as needed. Apply to axillary regions nightly) 60 mL 4    Cholecalciferol (VITAMIN D3) 2000 UNIT Cap Take  by mouth every day. Patient takes 1,000 units a day       No current facility-administered medications for this visit.       Allergies   Allergen Reactions    Codeine Vomiting     [id number]    Cefdinir      Rash    Hydrocodone Vomiting    Oxycodone Vomiting    Tramadol Vomiting     itching       ROS: As per HPI       Objective:     BP (!) 144/78 (BP Location: Right arm, Patient Position: Sitting, BP Cuff Size: Small adult)   Pulse 92   Temp 36.4 °C (97.5 °F) (Temporal)   Ht 1.575 m (5' 2\")   Wt 60.7 kg (133 lb 12.8 oz)   SpO2 96%  Body mass index is 24.47 kg/m².    Physical Exam:  Constitutional: Well-developed and well-nourished. Not diaphoretic. No distress. Lucid and fluent.  Patient, physician and staff all wearing masks.  Skin: Skin is warm and dry. No rash noted.  Head: Atraumatic without lesions.  Eyes: Conjunctivae and extraocular motions are normal. Pupils are equal, round, and reactive to light. No scleral icterus.   Ears:  External ears unremarkable.   Neck: Supple, trachea midline. No thyromegaly present. No cervical or supraclavicular lymphadenopathy. No JVD or carotid bruits appreciated.  Occipital region tenderness at muscle attachments.  Cardiovascular: Regular rate and rhythm.  Normal S1, S2 without murmur appreciated.  Chest: Effort normal. Clear to auscultation throughout. No adventitious sounds. "   Abdomen: Soft, non tender, and without distention. Active bowel sounds in all four quadrants. No rebound, guarding, masses or hepatosplenomegaly.  Extremities: No cyanosis, clubbing, erythema, nor edema.   Neurological: Alert and oriented x 3. No tremor appreciated.  Psychiatric:  Behavior, mood, and affect are appropriate.       Assessment and Plan:     72 y.o. female with the following issues:    1. JARRETT positive  hydroxychloroquine 300 MG Tab      2. Autoimmune disease (HCC)  hydroxychloroquine 300 MG Tab      3. Situational anxiety  ALPRAZolam (XANAX) 0.5 MG Tab      4. Posterior neck pain        5. Itching              Followup: Return in about 7 weeks (around 2/1/2023), or if symptoms worsen or fail to improve.

## 2022-12-20 ENCOUNTER — TELEPHONE (OUTPATIENT)
Dept: MEDICAL GROUP | Facility: MEDICAL CENTER | Age: 72
End: 2022-12-20
Payer: MEDICARE

## 2022-12-20 NOTE — TELEPHONE ENCOUNTER
Pt LVM stating the Hydroxychloroquine is causing adverse reactions. Pt did not leave details as to what, but she is hoping to discontinue this medication. Please advise.

## 2022-12-22 ENCOUNTER — PATIENT OUTREACH (OUTPATIENT)
Dept: HEALTH INFORMATION MANAGEMENT | Facility: OTHER | Age: 72
End: 2022-12-22
Payer: MEDICARE

## 2022-12-22 DIAGNOSIS — I10 ESSENTIAL HYPERTENSION: ICD-10-CM

## 2022-12-22 DIAGNOSIS — F33.0 MILD EPISODE OF RECURRENT MAJOR DEPRESSIVE DISORDER (HCC): ICD-10-CM

## 2022-12-22 NOTE — PROGRESS NOTES
CAMILA Marino Called the ptr to introduce the CCM Program to the pt. Pt accepted and is set for enrollment on 1/4 @10am with the CCM nurse Prem.

## 2022-12-27 ENCOUNTER — TELEPHONE (OUTPATIENT)
Dept: MEDICAL GROUP | Facility: MEDICAL CENTER | Age: 72
End: 2022-12-27
Payer: MEDICARE

## 2022-12-27 DIAGNOSIS — L01.00 IMPETIGO: ICD-10-CM

## 2022-12-27 RX ORDER — SULFAMETHOXAZOLE AND TRIMETHOPRIM 800; 160 MG/1; MG/1
1 TABLET ORAL 2 TIMES DAILY
Qty: 10 TABLET | Refills: 0 | Status: SHIPPED | OUTPATIENT
Start: 2022-12-27 | End: 2023-01-01

## 2022-12-27 NOTE — TELEPHONE ENCOUNTER
Patient does not have [facility] email.  Therefore she cannot send us a picture.  I will go ahead and treat.  I have sent the prescription to [facility].  If this does not improve within 3 days she will need to come in or go to urgent care.

## 2022-12-27 NOTE — TELEPHONE ENCOUNTER
Pt LVM requesting an antibiotic for Impetigo. Pt states she has impetigo on the lip. Please advise.

## 2022-12-30 ENCOUNTER — HOSPITAL ENCOUNTER (OUTPATIENT)
Dept: LAB | Facility: MEDICAL CENTER | Age: 72
End: 2022-12-30
Attending: INTERNAL MEDICINE
Payer: MEDICARE

## 2022-12-30 LAB
ALBUMIN SERPL BCP-MCNC: 4.6 G/DL (ref 3.2–4.9)
ALBUMIN/GLOB SERPL: 1.4 G/DL
ALP SERPL-CCNC: 62 U/L (ref 30–99)
ALT SERPL-CCNC: 15 U/L (ref 2–50)
ANION GAP SERPL CALC-SCNC: 15 MMOL/L (ref 7–16)
AST SERPL-CCNC: 20 U/L (ref 12–45)
BASOPHILS # BLD AUTO: 0.3 % (ref 0–1.8)
BASOPHILS # BLD AUTO: 0.4 % (ref 0–1.8)
BASOPHILS # BLD: 0.03 K/UL (ref 0–0.12)
BASOPHILS # BLD: 0.04 K/UL (ref 0–0.12)
BILIRUB SERPL-MCNC: 0.6 MG/DL (ref 0.1–1.5)
BUN SERPL-MCNC: 11 MG/DL (ref 8–22)
C3 SERPL-MCNC: 118.9 MG/DL (ref 87–200)
C4 SERPL-MCNC: 28.3 MG/DL (ref 19–52)
CALCIUM ALBUM COR SERPL-MCNC: 9.6 MG/DL (ref 8.5–10.5)
CALCIUM SERPL-MCNC: 10.1 MG/DL (ref 8.5–10.5)
CHLORIDE SERPL-SCNC: 103 MMOL/L (ref 96–112)
CO2 SERPL-SCNC: 21 MMOL/L (ref 20–33)
CREAT SERPL-MCNC: 0.83 MG/DL (ref 0.5–1.4)
EOSINOPHIL # BLD AUTO: 0 K/UL (ref 0–0.51)
EOSINOPHIL # BLD AUTO: 0 K/UL (ref 0–0.51)
EOSINOPHIL NFR BLD: 0 % (ref 0–6.9)
EOSINOPHIL NFR BLD: 0 % (ref 0–6.9)
ERYTHROCYTE [DISTWIDTH] IN BLOOD BY AUTOMATED COUNT: 48.5 FL (ref 35.9–50)
ERYTHROCYTE [DISTWIDTH] IN BLOOD BY AUTOMATED COUNT: 49.1 FL (ref 35.9–50)
GFR SERPLBLD CREATININE-BSD FMLA CKD-EPI: 76 ML/MIN/1.73 M 2
GLOBULIN SER CALC-MCNC: 3.3 G/DL (ref 1.9–3.5)
GLUCOSE SERPL-MCNC: 135 MG/DL (ref 65–99)
HCT VFR BLD AUTO: 46.9 % (ref 37–47)
HCT VFR BLD AUTO: 47.1 % (ref 37–47)
HGB BLD-MCNC: 16.1 G/DL (ref 12–16)
HGB BLD-MCNC: 16.1 G/DL (ref 12–16)
IMM GRANULOCYTES # BLD AUTO: 0.1 K/UL (ref 0–0.11)
IMM GRANULOCYTES # BLD AUTO: 0.11 K/UL (ref 0–0.11)
IMM GRANULOCYTES NFR BLD AUTO: 1.1 % (ref 0–0.9)
IMM GRANULOCYTES NFR BLD AUTO: 1.1 % (ref 0–0.9)
LYMPHOCYTES # BLD AUTO: 1 K/UL (ref 1–4.8)
LYMPHOCYTES # BLD AUTO: 1.13 K/UL (ref 1–4.8)
LYMPHOCYTES NFR BLD: 11 % (ref 22–41)
LYMPHOCYTES NFR BLD: 11.5 % (ref 22–41)
MCH RBC QN AUTO: 33.5 PG (ref 27–33)
MCH RBC QN AUTO: 33.7 PG (ref 27–33)
MCHC RBC AUTO-ENTMCNC: 34.2 G/DL (ref 33.6–35)
MCHC RBC AUTO-ENTMCNC: 34.3 G/DL (ref 33.6–35)
MCV RBC AUTO: 97.9 FL (ref 81.4–97.8)
MCV RBC AUTO: 98.1 FL (ref 81.4–97.8)
MONOCYTES # BLD AUTO: 0.35 K/UL (ref 0–0.85)
MONOCYTES # BLD AUTO: 0.39 K/UL (ref 0–0.85)
MONOCYTES NFR BLD AUTO: 3.8 % (ref 0–13.4)
MONOCYTES NFR BLD AUTO: 4 % (ref 0–13.4)
NEUTROPHILS # BLD AUTO: 7.62 K/UL (ref 2–7.15)
NEUTROPHILS # BLD AUTO: 8.18 K/UL (ref 2–7.15)
NEUTROPHILS NFR BLD: 83 % (ref 44–72)
NEUTROPHILS NFR BLD: 83.8 % (ref 44–72)
NRBC # BLD AUTO: 0 K/UL
NRBC # BLD AUTO: 0 K/UL
NRBC BLD-RTO: 0 /100 WBC
NRBC BLD-RTO: 0 /100 WBC
PLATELET # BLD AUTO: 276 K/UL (ref 164–446)
PLATELET # BLD AUTO: 279 K/UL (ref 164–446)
PMV BLD AUTO: 10 FL (ref 9–12.9)
PMV BLD AUTO: 9.9 FL (ref 9–12.9)
POTASSIUM SERPL-SCNC: 3.9 MMOL/L (ref 3.6–5.5)
PROT SERPL-MCNC: 7.9 G/DL (ref 6–8.2)
RBC # BLD AUTO: 4.78 M/UL (ref 4.2–5.4)
RBC # BLD AUTO: 4.81 M/UL (ref 4.2–5.4)
SODIUM SERPL-SCNC: 139 MMOL/L (ref 135–145)
WBC # BLD AUTO: 9.1 K/UL (ref 4.8–10.8)
WBC # BLD AUTO: 9.9 K/UL (ref 4.8–10.8)

## 2022-12-30 PROCEDURE — 86160 COMPLEMENT ANTIGEN: CPT

## 2022-12-30 PROCEDURE — 80076 HEPATIC FUNCTION PANEL: CPT

## 2022-12-30 PROCEDURE — 82565 ASSAY OF CREATININE: CPT | Mod: XU

## 2022-12-30 PROCEDURE — 86225 DNA ANTIBODY NATIVE: CPT

## 2022-12-30 PROCEDURE — 80053 COMPREHEN METABOLIC PANEL: CPT

## 2022-12-30 PROCEDURE — 86256 FLUORESCENT ANTIBODY TITER: CPT

## 2022-12-30 PROCEDURE — 36415 COLL VENOUS BLD VENIPUNCTURE: CPT

## 2022-12-30 PROCEDURE — 85025 COMPLETE CBC W/AUTO DIFF WBC: CPT | Mod: 91

## 2022-12-30 PROCEDURE — 85025 COMPLETE CBC W/AUTO DIFF WBC: CPT

## 2022-12-31 LAB
ALBUMIN SERPL BCP-MCNC: 4.7 G/DL (ref 3.2–4.9)
ALP SERPL-CCNC: 62 U/L (ref 30–99)
ALT SERPL-CCNC: 20 U/L (ref 2–50)
AST SERPL-CCNC: 29 U/L (ref 12–45)
BILIRUB CONJ SERPL-MCNC: <0.2 MG/DL (ref 0.1–0.5)
BILIRUB INDIRECT SERPL-MCNC: NORMAL MG/DL (ref 0–1)
BILIRUB SERPL-MCNC: 0.7 MG/DL (ref 0.1–1.5)
CREAT SERPL-MCNC: 0.81 MG/DL (ref 0.5–1.4)
GFR SERPLBLD CREATININE-BSD FMLA CKD-EPI: 78 ML/MIN/1.73 M 2
PROT SERPL-MCNC: 8 G/DL (ref 6–8.2)

## 2023-01-04 ENCOUNTER — PATIENT OUTREACH (OUTPATIENT)
Dept: HEALTH INFORMATION MANAGEMENT | Facility: OTHER | Age: 73
End: 2023-01-04
Payer: MEDICARE

## 2023-01-04 DIAGNOSIS — K74.60 CIRRHOSIS, NON-ALCOHOLIC (HCC): ICD-10-CM

## 2023-01-04 LAB — DSDNA AB TITR SER CLIF: 389 IU (ref 0–24)

## 2023-01-04 PROCEDURE — 99439 CHRNC CARE MGMT STAF EA ADDL: CPT | Performed by: FAMILY MEDICINE

## 2023-01-04 PROCEDURE — 99490 CHRNC CARE MGMT STAFF 1ST 20: CPT | Performed by: FAMILY MEDICINE

## 2023-01-04 SDOH — ECONOMIC STABILITY: FOOD INSECURITY: WITHIN THE PAST 12 MONTHS, YOU WORRIED THAT YOUR FOOD WOULD RUN OUT BEFORE YOU GOT MONEY TO BUY MORE.: NEVER TRUE

## 2023-01-04 SDOH — ECONOMIC STABILITY: FOOD INSECURITY: WITHIN THE PAST 12 MONTHS, THE FOOD YOU BOUGHT JUST DIDN'T LAST AND YOU DIDN'T HAVE MONEY TO GET MORE.: NEVER TRUE

## 2023-01-04 SDOH — HEALTH STABILITY: PHYSICAL HEALTH: ON AVERAGE, HOW MANY DAYS PER WEEK DO YOU ENGAGE IN MODERATE TO STRENUOUS EXERCISE (LIKE A BRISK WALK)?: 7 DAYS

## 2023-01-04 SDOH — ECONOMIC STABILITY: INCOME INSECURITY: IN THE LAST 12 MONTHS, WAS THERE A TIME WHEN YOU WERE NOT ABLE TO PAY THE MORTGAGE OR RENT ON TIME?: NO

## 2023-01-04 SDOH — ECONOMIC STABILITY: TRANSPORTATION INSECURITY
IN THE PAST 12 MONTHS, HAS LACK OF TRANSPORTATION KEPT YOU FROM MEETINGS, WORK, OR FROM GETTING THINGS NEEDED FOR DAILY LIVING?: NO

## 2023-01-04 SDOH — ECONOMIC STABILITY: HOUSING INSECURITY
IN THE LAST 12 MONTHS, WAS THERE A TIME WHEN YOU DID NOT HAVE A STEADY PLACE TO SLEEP OR SLEPT IN A SHELTER (INCLUDING NOW)?: NO

## 2023-01-04 SDOH — HEALTH STABILITY: PHYSICAL HEALTH: ON AVERAGE, HOW MANY MINUTES DO YOU ENGAGE IN EXERCISE AT THIS LEVEL?: 150+ MIN

## 2023-01-04 SDOH — ECONOMIC STABILITY: HOUSING INSECURITY: IN THE LAST 12 MONTHS, HOW MANY PLACES HAVE YOU LIVED?: 1

## 2023-01-04 SDOH — ECONOMIC STABILITY: TRANSPORTATION INSECURITY
IN THE PAST 12 MONTHS, HAS THE LACK OF TRANSPORTATION KEPT YOU FROM MEDICAL APPOINTMENTS OR FROM GETTING MEDICATIONS?: NO

## 2023-01-04 ASSESSMENT — SOCIAL DETERMINANTS OF HEALTH (SDOH)
HOW OFTEN DO YOU ATTENT MEETINGS OF THE CLUB OR ORGANIZATION YOU BELONG TO?: NEVER
HOW HARD IS IT FOR YOU TO PAY FOR THE VERY BASICS LIKE FOOD, HOUSING, MEDICAL CARE, AND HEATING?: SOMEWHAT HARD
WITHIN THE LAST YEAR, HAVE YOU BEEN AFRAID OF YOUR PARTNER OR EX-PARTNER?: NO
WITHIN THE LAST YEAR, HAVE YOU BEEN HUMILIATED OR EMOTIONALLY ABUSED IN OTHER WAYS BY YOUR PARTNER OR EX-PARTNER?: NO
HOW OFTEN DO YOU ATTEND CHURCH OR RELIGIOUS SERVICES?: MORE THAN 4 TIMES PER YEAR
HOW OFTEN DO YOU GET TOGETHER WITH FRIENDS OR RELATIVES?: MORE THAN THREE TIMES A WEEK
WITHIN THE LAST YEAR, HAVE YOU BEEN KICKED, HIT, SLAPPED, OR OTHERWISE PHYSICALLY HURT BY YOUR PARTNER OR EX-PARTNER?: NO
IN A TYPICAL WEEK, HOW MANY TIMES DO YOU TALK ON THE PHONE WITH FAMILY, FRIENDS, OR NEIGHBORS?: MORE THAN THREE TIMES A WEEK
DO YOU BELONG TO ANY CLUBS OR ORGANIZATIONS SUCH AS CHURCH GROUPS UNIONS, FRATERNAL OR ATHLETIC GROUPS, OR SCHOOL GROUPS?: NO
WITHIN THE LAST YEAR, HAVE TO BEEN RAPED OR FORCED TO HAVE ANY KIND OF SEXUAL ACTIVITY BY YOUR PARTNER OR EX-PARTNER?: NO

## 2023-01-04 ASSESSMENT — LIFESTYLE VARIABLES
AUDIT-C TOTAL SCORE: 0
HOW OFTEN DO YOU HAVE A DRINK CONTAINING ALCOHOL: NEVER
HOW MANY STANDARD DRINKS CONTAINING ALCOHOL DO YOU HAVE ON A TYPICAL DAY: PATIENT DOES NOT DRINK
HOW OFTEN DO YOU HAVE SIX OR MORE DRINKS ON ONE OCCASION: NEVER
SKIP TO QUESTIONS 9-10: 1

## 2023-01-04 ASSESSMENT — PATIENT HEALTH QUESTIONNAIRE - PHQ9
SUM OF ALL RESPONSES TO PHQ QUESTIONS 1-9: 20
CLINICAL INTERPRETATION OF PHQ2 SCORE: 6
5. POOR APPETITE OR OVEREATING: 3 - NEARLY EVERY DAY

## 2023-01-04 NOTE — PROGRESS NOTES
"INITIAL CARE MANAGEMENT CARE PLAN/ASSESSMENT     Called and spoke to patient to enroll her in the Personal Care Management Program. Patient gave her verbal consent and expressed her desire to enroll in the program. Patient verified her identity by providing this RN with her full name and . Patient is eligible for CCM due to her risk score of 8 and her HTN, Cardiomyopathy, HLD, non-alcoholic cirrhosis, and autoimmune hepatitis diagnoses. Patient is a  72 year old J[employer]ism female who lives in a mobile home that she owns with her 2 great grandsons that she is raising. Patient remains independent with her ADL's and IADL's and is an active , though she would be interested in SCP transportation for times she cannot drive. Patient tries to eat healthy related to her conditions and is very active and walk/exercises daily. Patient has trouble sleeping related to her anxiety and disease conditions and takes Xanax. Patient reports a poor appetite and has lost approx 70 pounds. Patient is very down and depressed to her recent chronic illness diagnoses but is trying to stay positive and states she is \"not giving up\" so that she can continue to raise her great grandchildren. Patient's main goal is to continue to do whatever she can to get better for her family. Patient does not have an Advanced Directive and would like this RN to send her information on completing one.    Patient understands the monthly follow-up call requirements of the PCM program. First monthly call is scheduled for 2023 @ 10:30am. Patient has this RN's name and PCM program phone number and knows she can call with any health questions or concerns. Patient has no other immediate needs/concerns to address at this time.      Reviewed medications listed below with patient.      Current Outpatient Medications:     hydroxychloroquine 300 MG Tab, Take 200 mg by mouth every day., Disp: 90 Tablet, Rfl: 3    ALPRAZolam (XANAX) 0.5 MG Tab, Take 1 " Tablet by mouth 3 times a day as needed for Sleep or Anxiety for up to 90 days. 90 tablets is a 30 day quantity, Disp: 90 Tablet, Rfl: 2    losartan (COZAAR) 100 MG Tab, TAKE 1 TABLET BY MOUTH EVERY DAY, Disp: 100 Tablet, Rfl: 3    azaTHIOprine (IMURAN) 50 MG Tab, Take 50 mg by mouth 2 times a day., Disp: , Rfl:     pantoprazole (PROTONIX) 40 MG Tablet Delayed Response, Take 1 Tablet by mouth every day., Disp: 90 Tablet, Rfl: 3    atorvastatin (LIPITOR) 20 MG Tab, Take 1 Tablet by mouth at bedtime., Disp: 90 Tablet, Rfl: 3    famotidine (PEPCID) 20 MG Tab, Take 1 Tablet by mouth 2 times a day., Disp: 180 Tablet, Rfl: 2    amLODIPine (NORVASC) 5 MG Tab, Take 1 Tablet by mouth every day., Disp: 90 Tablet, Rfl: 3    Vitamin E 268 MG (400 UNIT) Cap, Take  by mouth every day., Disp: , Rfl:     Ascorbic Acid (VITAMIN C) 1000 MG Tab, Take 2,000 mg by mouth every day., Disp: , Rfl:     Multiple Vitamin Tab, Take  by mouth every day., Disp: , Rfl:     aluminum chloride (DRYSOL) 20 % external solution, Apply to axillary regions nightly (Patient taking differently: at bedtime as needed. Apply to axillary regions nightly), Disp: 60 mL, Rfl: 4    Cholecalciferol (VITAMIN D3) 2000 UNIT Cap, Take  by mouth every day. Patient takes 1,000 units a day, Disp: , Rfl:          Goal: Patient will continue to take her medications as scheduled/prescribed.        Physical/Functional/Environmental Status:     Activities of Daily Living:  Bathing: independent   Dressing: independent  Grooming: independent  Mouth Care: independent  Toileting: independent  Climbing a Flight of Stairs: independent    Independent Activities of Daily Living:  Shopping: independent  Cooking: independent  Managing Medications: independent  Using the phone and looking up numbers: independent  Driving or using public transportation: independent  Managing Finances: independent        1/4/2023   STEADI Fall Risk   One or more falls in the last year No       Multiple  values from one day are sorted in reverse-chronological order               Goal: Patient will remain free from falls and injuries from falls.      Financial Status:     Patient is financially stable      Goal: N/A     Transportation Status:    Patient is an active .     Goal: Patient will make all her scheduled provider appointments    Mental/Behavioral/Psychosocial Status:        8/24/2021 4/4/2022 1/4/2023   Depression Screen (PHQ-2/PHQ-9)   PHQ-2 Total Score 4 0 6   PHQ-9 Total Score 9  20       Multiple values from one day are sorted in reverse-chronological order       Interpretation of PHQ-9 Total Score   Score Severity   1-4 No Depression   5-9 Mild Depression   10-14 Moderate Depression   15-19 Moderately Severe Depression   20-27 Severe Depression       Goal:  Patient will seek behavioral health services for her depression. Doesn't want to give up to take care of her great grand kids.       Chronic Care Management Care Plan         Goal:  Patient will manage her depression related to her health conditions and not give up for her great grandchildren.   Barriers: Physical, knowledge deficit, patient adherence/motivation.  Interventions: Provide support to patient in seeking behavioral health services, encourage patient to discuss BH options at her next PCP appointment, provide education/motivation to patient to support her during the chronic illness progression.     Start Date: 1/4/2023  End Date:      Goal:  Patient will complete an Advanced Directive   Barriers: Knowledge Deficit, patient adherence/motivation  Interventions: Provide education/motivation to patient on importance of completing an Advanced Directive, Provide patient with education/resources on completing an Advanced Directive.     Start Date: 1/4/2023  End Date:           Discussion: Goals, barriers, and Interventions    Goals: Discussed and Created.      Next Scheduled patient outreach:  2/6/2023 @ 10:30am

## 2023-01-04 NOTE — PROGRESS NOTES
CHW Ned called the pt to speak about the resources the pt had requested for [employer] for SCP. This CHW spoke with the pt and will also send out other resources that this CHW has. CHW will follow up with the pt in a week or 2 to see how she is doing and if she needs any other services.

## 2023-01-05 LAB — DSDNA IGG TITR SER CLIF: ABNORMAL {TITER}

## 2023-01-12 DIAGNOSIS — R10.10 PAIN OF UPPER ABDOMEN: ICD-10-CM

## 2023-01-12 NOTE — TELEPHONE ENCOUNTER
Received request via: Pharmacy    Was the patient seen in the last year in this department? Yes    Does the patient have an active prescription (recently filled or refills available) for medication(s) requested? No    Does the patient have snf Plus and need 100 day supply (blood pressure, diabetes and cholesterol meds only)? Yes, quantity updated to 100 days

## 2023-01-16 RX ORDER — FAMOTIDINE 20 MG/1
TABLET, FILM COATED ORAL
Qty: 180 TABLET | Refills: 2 | Status: SHIPPED | OUTPATIENT
Start: 2023-01-16 | End: 2023-10-20

## 2023-02-01 ENCOUNTER — PATIENT OUTREACH (OUTPATIENT)
Dept: HEALTH INFORMATION MANAGEMENT | Facility: OTHER | Age: 73
End: 2023-02-01

## 2023-02-01 ENCOUNTER — OFFICE VISIT (OUTPATIENT)
Dept: MEDICAL GROUP | Facility: MEDICAL CENTER | Age: 73
End: 2023-02-01
Payer: MEDICARE

## 2023-02-01 VITALS
DIASTOLIC BLOOD PRESSURE: 76 MMHG | WEIGHT: 134 LBS | HEIGHT: 62 IN | BODY MASS INDEX: 24.66 KG/M2 | HEART RATE: 78 BPM | OXYGEN SATURATION: 98 % | SYSTOLIC BLOOD PRESSURE: 142 MMHG | RESPIRATION RATE: 14 BRPM | TEMPERATURE: 97.9 F

## 2023-02-01 DIAGNOSIS — E63.9 IMPAIRED NUTRITION: ICD-10-CM

## 2023-02-01 DIAGNOSIS — I70.0 AORTIC ATHEROSCLEROSIS (HCC): ICD-10-CM

## 2023-02-01 DIAGNOSIS — F33.0 MILD EPISODE OF RECURRENT MAJOR DEPRESSIVE DISORDER (HCC): ICD-10-CM

## 2023-02-01 DIAGNOSIS — Z23 NEED FOR HEPATITIS B VACCINATION: ICD-10-CM

## 2023-02-01 DIAGNOSIS — I10 ESSENTIAL HYPERTENSION: ICD-10-CM

## 2023-02-01 DIAGNOSIS — K74.60 CIRRHOSIS, NON-ALCOHOLIC (HCC): ICD-10-CM

## 2023-02-01 DIAGNOSIS — K29.30 CHRONIC SUPERFICIAL GASTRITIS WITHOUT BLEEDING: ICD-10-CM

## 2023-02-01 DIAGNOSIS — M35.9 AUTOIMMUNE DISEASE (HCC): ICD-10-CM

## 2023-02-01 PROCEDURE — G0010 ADMIN HEPATITIS B VACCINE: HCPCS | Performed by: FAMILY MEDICINE

## 2023-02-01 PROCEDURE — 90746 HEPB VACCINE 3 DOSE ADULT IM: CPT | Performed by: FAMILY MEDICINE

## 2023-02-01 PROCEDURE — 99214 OFFICE O/P EST MOD 30 MIN: CPT | Mod: 25 | Performed by: FAMILY MEDICINE

## 2023-02-01 RX ORDER — FEEDER CONTAINER WITH PUMP SET
1 EACH MISCELLANEOUS 2 TIMES DAILY
Qty: 14220 ML | Refills: 11 | Status: SHIPPED | OUTPATIENT
Start: 2023-02-01

## 2023-02-01 ASSESSMENT — FIBROSIS 4 INDEX: FIB4 SCORE: 1.31

## 2023-02-01 NOTE — PROGRESS NOTES
Chief Complaint   Patient presents with    Immunizations     Hepatitis B     New Med Request     Ensure     GI Problem       Subjective:     HPI:   Karen Dimas presents today with the followin. Cirrhosis, non-alcoholic (HCC)/Autoimmune disease (HCC)  She has cirrhosis but recent EGD showed no varices.  This is due to autoimmune disease.      2. Chronic superficial gastritis without bleeding  Recent EGD showed persistent gastritis.  She is on PPI and H2 blockers.     3. Aortic atherosclerosis (HCC)  Incidentally seen, no aneurysm.  Is trying to quit smoking completely.      4. Mild episode of recurrent major depressive disorder (HCC)  She has been doing okay, takes care of her grandsons.  Denies intrusive negative thoughts and thoughts of self harm.    5. Impaired nutrition  She continues to have early satiety due to chronic gastritic despite H2 blockers and PPI.  Difficult for her to eat any food that causes stomach distention.  She can tolerate ensure.  She needs high protein ensure.  Cannot afford.  Order written for care chest.  - Nutritional Supplements (ENSURE HIGH PROTEIN) Liquid; Take 1 Can by mouth 2 times a day.  Dispense: 88519 mL; Refill: 11    6. Need for hepatitis B vaccination  Third hepatitis B today  - Hepatitis B Vaccine Adult IM      Patient Active Problem List    Diagnosis Date Noted    Posterior neck pain 2022    Itching 2022    Cirrhosis, non-alcoholic (HCC) 2022    Left ventricular hypokinesis 2022    Gómez's esophagus without dysplasia 2022    Cutaneous horn 2022    Chronic autoimmune hepatitis (HCC) 2022    Ds DNA antibody positive 2022    Elevated sedimentation rate 2022    JARRETT positive 2022    Autoimmune disease (HCC) 2022    Aortic atherosclerosis (HCC) 2022    Myofascial pain on left side 2021    Muscle tenderness 2021    History of 2019 novel coronavirus disease (COVID-19) 2021     Mild episode of recurrent major depressive disorder (HCC) 08/24/2021    History of Gómez's esophagus 05/13/2021    Bilateral radicular pain 01/20/2021    Muscle spasm 01/20/2021    Mixed hyperlipidemia 01/20/2021    Vitamin D deficiency 01/20/2021    Intervertebral cervical disc disorder with myelopathy, cervical region 11/29/2017    Transaminitis 12/19/2016    Hyperglobulinemia 12/19/2016    Osteoporosis 12/18/2016    Weakness 07/31/2015    Stress-induced cardiomyopathy 09/30/2014    Situational anxiety 09/29/2014    Microscopic colitis 09/28/2014    Essential hypertension 09/26/2014    Insomnia 09/26/2014    Gastritis 09/25/2014       Current medicines (including changes today)  Current Outpatient Medications   Medication Sig Dispense Refill    Nutritional Supplements (ENSURE HIGH PROTEIN) Liquid Take 1 Can by mouth 2 times a day. 66412 mL 11    famotidine (PEPCID) 20 MG Tab TAKE 1 TABLET BY MOUTH TWICE A  Tablet 2    hydroxychloroquine 300 MG Tab Take 200 mg by mouth every day. 90 Tablet 3    ALPRAZolam (XANAX) 0.5 MG Tab Take 1 Tablet by mouth 3 times a day as needed for Sleep or Anxiety for up to 90 days. 90 tablets is a 30 day quantity 90 Tablet 2    losartan (COZAAR) 100 MG Tab TAKE 1 TABLET BY MOUTH EVERY  Tablet 3    azaTHIOprine (IMURAN) 50 MG Tab Take 50 mg by mouth 2 times a day.      pantoprazole (PROTONIX) 40 MG Tablet Delayed Response Take 1 Tablet by mouth every day. 90 Tablet 3    atorvastatin (LIPITOR) 20 MG Tab Take 1 Tablet by mouth at bedtime. 90 Tablet 3    amLODIPine (NORVASC) 5 MG Tab Take 1 Tablet by mouth every day. 90 Tablet 3    Vitamin E 268 MG (400 UNIT) Cap Take  by mouth every day.      Ascorbic Acid (VITAMIN C) 1000 MG Tab Take 2,000 mg by mouth every day.      Multiple Vitamin Tab Take  by mouth every day.      aluminum chloride (DRYSOL) 20 % external solution Apply to axillary regions nightly (Patient taking differently: at bedtime as needed. Apply to axillary  "regions nightly) 60 mL 4    Cholecalciferol (VITAMIN D3) 2000 UNIT Cap Take  by mouth every day. Patient takes 1,000 units a day       No current facility-administered medications for this visit.       Allergies   Allergen Reactions    Codeine Vomiting     [id number]    Cefdinir      Rash    Hydrocodone Vomiting    Oxycodone Vomiting    Tramadol Vomiting     itching       ROS: As per HPI       Objective:     BP (!) 142/76 (BP Location: Right arm, Patient Position: Sitting, BP Cuff Size: Small adult)   Pulse 78   Temp 36.6 °C (97.9 °F) (Temporal)   Resp 14   Ht 1.575 m (5' 2\")   Wt 60.8 kg (134 lb)   SpO2 98%  Body mass index is 24.51 kg/m².    Physical Exam:  Constitutional: Well-developed and well-nourished. Not diaphoretic. No distress. Lucid and fluent.  Patient, physician and staff all wearing masks.  Skin: Skin is warm and dry. No rash noted.  Head: Atraumatic without lesions.  Eyes: Conjunctivae and extraocular motions are normal. Pupils are equal, round, and reactive to light. No scleral icterus.   Ears:  External ears unremarkable.   Neck: Supple, trachea midline. No thyromegaly present. No cervical or supraclavicular lymphadenopathy. No JVD or carotid bruits appreciated  Cardiovascular: Regular rate and rhythm.  Normal S1, S2 without murmur appreciated.  Chest: Effort normal. Clear to auscultation throughout. No adventitious sounds.   Abdomen: Soft, non tender, and without distention. Active bowel sounds in all four quadrants. No rebound, guarding, masses or hepatosplenomegaly.  Extremities: No cyanosis, clubbing, erythema, nor edema.   Neurological: Alert and oriented x 3. No tremor appreciated.  Psychiatric:  Behavior, mood, and affect are appropriate.       Assessment and Plan:     72 y.o. female with the following issues:    1. Cirrhosis, non-alcoholic (HCC)        2. Autoimmune disease (HCC)  Nutritional Supplements (ENSURE HIGH PROTEIN) Liquid      3. Chronic superficial gastritis without bleeding "  Nutritional Supplements (ENSURE HIGH PROTEIN) Liquid      4. Aortic atherosclerosis (HCC)        5. Mild episode of recurrent major depressive disorder (HCC)        6. Impaired nutrition  Nutritional Supplements (ENSURE HIGH PROTEIN) Liquid      7. Need for hepatitis B vaccination  Hepatitis B Vaccine Adult IM            Followup: Return in about 6 months (around 8/1/2023), or if symptoms worsen or fail to improve.

## 2023-02-01 NOTE — PROGRESS NOTES
CAMILA Marino called the pt to do the monthly follow up or Prem the RN who is no longer with up. This CHW let the pt know that another RN (Alejandra) will take over March 1st and if the pt needs anything between now and then to please reach out. Pt stated they would be good until then.

## 2023-02-07 NOTE — ED NOTES
Patient given crutches and instructed on how to use. Demonstrates use. No questions.   
Patient is resting comfortably.  
Patient verbalizes understanding of d/c instructions. Ambulates to lobby without difficulty.   
Opt out

## 2023-03-14 ENCOUNTER — PATIENT OUTREACH (OUTPATIENT)
Dept: HEALTH INFORMATION MANAGEMENT | Facility: OTHER | Age: 73
End: 2023-03-14
Payer: MEDICARE

## 2023-03-14 DIAGNOSIS — K75.4 CHRONIC AUTOIMMUNE HEPATITIS (HCC): ICD-10-CM

## 2023-03-14 DIAGNOSIS — F33.0 MILD EPISODE OF RECURRENT MAJOR DEPRESSIVE DISORDER (HCC): ICD-10-CM

## 2023-03-14 DIAGNOSIS — I10 ESSENTIAL HYPERTENSION: ICD-10-CM

## 2023-03-14 DIAGNOSIS — K74.60 CIRRHOSIS, NON-ALCOHOLIC (HCC): ICD-10-CM

## 2023-03-14 DIAGNOSIS — R76.8 ANA POSITIVE: ICD-10-CM

## 2023-03-14 PROCEDURE — 99999 PR NO CHARGE: CPT | Performed by: FAMILY MEDICINE

## 2023-03-14 NOTE — PROGRESS NOTES
"Assessment  Spoke with Madelyn for monthly outreach follow up. Madelyn states that she is \"doing pretty good.\" She states that she has headaches from the inflammation in her body but feels like her lupus is more under control right now. She states that her appetite is improved but she continues to loose weight easily. Her weight has been stable at her last PCP appointments, 133 pound on 12/16/22 was 134 pounds on 2/11/23. She is interested in learning more about advanced directive and the guardianship of great-grandchildren if something happens to her. Will refer to social work. Madelyn denied any other questions or concerns. Encouraged to call with any needs.     Education  Discussed Personal Care Management Program and services   Discussed social service referral regarding advanced directive and guardianship of great-grandchildren    Care Plan  Continue pain management  Continue community resources  Continue mental health     Progress:  Progressing     Next outreach:  1 month   "

## 2023-03-15 ENCOUNTER — PATIENT OUTREACH (OUTPATIENT)
Dept: HEALTH INFORMATION MANAGEMENT | Facility: OTHER | Age: 73
End: 2023-03-15
Payer: MEDICARE

## 2023-03-15 NOTE — PROGRESS NOTES
3-15-23  VISHAL received referral for Madelyn from ANDERS Rosenberg to help her with advance directive and guardianship of her great grandchildren.  @[id number] VISHAL called Madelyn and conducted an abbreviated assessment around the issues pertinent to Madelyn at this time.  @[id number] VISHAL mailed Madelyn the contact information for legal services offering free and reduced rate services to help Madelyn establish who will take legal guardianship over her grandsons if she were to pass away before they were of age to care for themselves. VISHAL also mailed Madelyn the advance directive documents, as well as the phone number she can call with any questions about free [employer] classes to complete the advance directive, etc.    Social Work Assessment  Community Care Management    Synopsis: Madelyn is interested in receiving resources that can help her to create a trust to direct who will raise her grandsons if she passes away prior to their reaching adulthood. Madelyn would also like to create an advance directive for decisions on her own medical care. Madelyn shared with VISHAL that she would like her sister to make medical decisions for her if she cannot.     Living Situation/Home Environment: Madelyn has been raising her two grandsons, age 13 and 14 almost since they were born. Parmjits would like her niece in [place] to raise the boys if Madelyn passes away before they are of age. Madelyn has legal guardianship over the boys, however they are to remain in the state [facility] and her niece resides in [place], [place]. Madelyn would like help overcoming this obstacle so that her niece can be appointed.   Financial Situation/Sources of Income:   Transportation:   Support System: Madelyn shared with VISHAL that she is her grandson's support system, in addition to her extended family. Madelyn shared with VISHAL that if she was unable to make her own medical decisions, she would like her sister to make them for her. Madelyn told VISHAL that her sister is who she would like to appoint in her advance  directive.  Mental Health/Substance Abuse Hx:   Ability to Obtain Basic Resources: Madelyn stated that she is currently caring for her grandsons at this time.  Physical Functioning: Madelyn shared with VISHAL that currently she is able to care for herself and her grandsons.  Patient's Perception of Needs: Madelyn would like to ensure that she has selected who will raise her grandsons if she is not able to complete the process of raising them.     Plan: VISHAL will send Madelyn the information for legal services offering free and reduced rate services to help Madelyn establish legally who will take  guardianship over her grandsons if she were to pass away before they were of age to care for themselves. VISHAL will also send Madelyn the advance directive documents, as well as a phone number she can call with any questions about free [employer] classes to complete the advance directive, etc. VISHAL made sure Madelyn has SW contact info in case she has any questions. VISHAL will follow up as needed.

## 2023-04-11 DIAGNOSIS — E78.2 MIXED HYPERLIPIDEMIA: ICD-10-CM

## 2023-04-11 DIAGNOSIS — F41.8 SITUATIONAL ANXIETY: ICD-10-CM

## 2023-04-12 DIAGNOSIS — F41.8 SITUATIONAL ANXIETY: ICD-10-CM

## 2023-04-12 NOTE — TELEPHONE ENCOUNTER
Received request via: Patient    Was the patient seen in the last year in this department? Yes    Does the patient have an active prescription (recently filled or refills available) for medication(s) requested? No    Does the patient have correction Plus and need 100 day supply (blood pressure, diabetes and cholesterol meds only)? Medication is not for cholesterol, blood pressure or diabetes

## 2023-04-13 RX ORDER — ATORVASTATIN CALCIUM 20 MG/1
TABLET, FILM COATED ORAL
Qty: 100 TABLET | Refills: 3 | Status: SHIPPED | OUTPATIENT
Start: 2023-04-13

## 2023-04-13 RX ORDER — ALPRAZOLAM 0.5 MG/1
TABLET ORAL
Qty: 90 TABLET | Refills: 2 | Status: SHIPPED | OUTPATIENT
Start: 2023-04-13 | End: 2023-06-14

## 2023-04-14 NOTE — TELEPHONE ENCOUNTER
PDMP review shows no inconsistencies.  Patient seen recently.  Alprazolam is renewed.  Atorvastatin renewed as well.

## 2023-04-16 RX ORDER — ALPRAZOLAM 0.5 MG/1
0.5 TABLET ORAL 3 TIMES DAILY PRN
Qty: 90 TABLET | Refills: 2 | Status: SHIPPED | OUTPATIENT
Start: 2023-04-16 | End: 2023-07-15

## 2023-04-19 ENCOUNTER — HOSPITAL ENCOUNTER (OUTPATIENT)
Dept: LAB | Facility: MEDICAL CENTER | Age: 73
End: 2023-04-19
Attending: INTERNAL MEDICINE
Payer: MEDICARE

## 2023-04-19 ENCOUNTER — PATIENT OUTREACH (OUTPATIENT)
Dept: HEALTH INFORMATION MANAGEMENT | Facility: OTHER | Age: 73
End: 2023-04-19
Payer: MEDICARE

## 2023-04-19 LAB
ALBUMIN SERPL BCP-MCNC: 4.6 G/DL (ref 3.2–4.9)
ALBUMIN/GLOB SERPL: 1.4 G/DL
ALP SERPL-CCNC: 68 U/L (ref 30–99)
ALT SERPL-CCNC: 28 U/L (ref 2–50)
ANION GAP SERPL CALC-SCNC: 15 MMOL/L (ref 7–16)
APPEARANCE UR: CLEAR
AST SERPL-CCNC: 33 U/L (ref 12–45)
BASOPHILS # BLD AUTO: 1 % (ref 0–1.8)
BASOPHILS # BLD: 0.05 K/UL (ref 0–0.12)
BILIRUB SERPL-MCNC: 0.5 MG/DL (ref 0.1–1.5)
BILIRUB UR QL STRIP.AUTO: NEGATIVE
BUN SERPL-MCNC: 11 MG/DL (ref 8–22)
CALCIUM ALBUM COR SERPL-MCNC: 9.8 MG/DL (ref 8.5–10.5)
CALCIUM SERPL-MCNC: 10.3 MG/DL (ref 8.5–10.5)
CHLORIDE SERPL-SCNC: 106 MMOL/L (ref 96–112)
CO2 SERPL-SCNC: 22 MMOL/L (ref 20–33)
COLOR UR: YELLOW
CREAT SERPL-MCNC: 0.75 MG/DL (ref 0.5–1.4)
EOSINOPHIL # BLD AUTO: 0.12 K/UL (ref 0–0.51)
EOSINOPHIL NFR BLD: 2.4 % (ref 0–6.9)
ERYTHROCYTE [DISTWIDTH] IN BLOOD BY AUTOMATED COUNT: 44 FL (ref 35.9–50)
GFR SERPLBLD CREATININE-BSD FMLA CKD-EPI: 85 ML/MIN/1.73 M 2
GLOBULIN SER CALC-MCNC: 3.3 G/DL (ref 1.9–3.5)
GLUCOSE SERPL-MCNC: 126 MG/DL (ref 65–99)
GLUCOSE UR STRIP.AUTO-MCNC: NEGATIVE MG/DL
HCT VFR BLD AUTO: 43.1 % (ref 37–47)
HGB BLD-MCNC: 15.4 G/DL (ref 12–16)
IMM GRANULOCYTES # BLD AUTO: 0.01 K/UL (ref 0–0.11)
IMM GRANULOCYTES NFR BLD AUTO: 0.2 % (ref 0–0.9)
KETONES UR STRIP.AUTO-MCNC: NEGATIVE MG/DL
LEUKOCYTE ESTERASE UR QL STRIP.AUTO: NEGATIVE
LYMPHOCYTES # BLD AUTO: 1.87 K/UL (ref 1–4.8)
LYMPHOCYTES NFR BLD: 37.4 % (ref 22–41)
MCH RBC QN AUTO: 33.6 PG (ref 27–33)
MCHC RBC AUTO-ENTMCNC: 35.7 G/DL (ref 33.6–35)
MCV RBC AUTO: 93.9 FL (ref 81.4–97.8)
MICRO URNS: NORMAL
MONOCYTES # BLD AUTO: 0.58 K/UL (ref 0–0.85)
MONOCYTES NFR BLD AUTO: 11.6 % (ref 0–13.4)
NEUTROPHILS # BLD AUTO: 2.37 K/UL (ref 2–7.15)
NEUTROPHILS NFR BLD: 47.4 % (ref 44–72)
NITRITE UR QL STRIP.AUTO: NEGATIVE
NRBC # BLD AUTO: 0 K/UL
NRBC BLD-RTO: 0 /100 WBC
PH UR STRIP.AUTO: 6.5 [PH] (ref 5–8)
PLATELET # BLD AUTO: 272 K/UL (ref 164–446)
PMV BLD AUTO: 10.1 FL (ref 9–12.9)
POTASSIUM SERPL-SCNC: 3.8 MMOL/L (ref 3.6–5.5)
PROT SERPL-MCNC: 7.9 G/DL (ref 6–8.2)
PROT UR QL STRIP: NEGATIVE MG/DL
RBC # BLD AUTO: 4.59 M/UL (ref 4.2–5.4)
RBC UR QL AUTO: NEGATIVE
SODIUM SERPL-SCNC: 143 MMOL/L (ref 135–145)
SP GR UR STRIP.AUTO: 1.01
UROBILINOGEN UR STRIP.AUTO-MCNC: 0.2 MG/DL
WBC # BLD AUTO: 5 K/UL (ref 4.8–10.8)

## 2023-04-19 PROCEDURE — 86225 DNA ANTIBODY NATIVE: CPT

## 2023-04-19 PROCEDURE — 86160 COMPLEMENT ANTIGEN: CPT | Mod: 91

## 2023-04-19 PROCEDURE — 82570 ASSAY OF URINE CREATININE: CPT

## 2023-04-19 PROCEDURE — 80053 COMPREHEN METABOLIC PANEL: CPT

## 2023-04-19 PROCEDURE — 81003 URINALYSIS AUTO W/O SCOPE: CPT

## 2023-04-19 PROCEDURE — 85025 COMPLETE CBC W/AUTO DIFF WBC: CPT

## 2023-04-19 PROCEDURE — 86256 FLUORESCENT ANTIBODY TITER: CPT

## 2023-04-19 PROCEDURE — 36415 COLL VENOUS BLD VENIPUNCTURE: CPT

## 2023-04-19 PROCEDURE — 84156 ASSAY OF PROTEIN URINE: CPT

## 2023-04-20 LAB
C3 SERPL-MCNC: 110.1 MG/DL (ref 87–200)
C4 SERPL-MCNC: 21.6 MG/DL (ref 19–52)
CREAT UR-MCNC: 44.16 MG/DL
PROT UR-MCNC: 4 MG/DL (ref 0–15)
PROT/CREAT UR: 91 MG/G (ref 10–107)

## 2023-04-21 LAB — DSDNA AB TITR SER CLIF: NORMAL {TITER}

## 2023-04-24 LAB — DSDNA IGG TITR SER CLIF: ABNORMAL {TITER}

## 2023-04-26 ENCOUNTER — PATIENT OUTREACH (OUTPATIENT)
Dept: HEALTH INFORMATION MANAGEMENT | Facility: OTHER | Age: 73
End: 2023-04-26
Payer: MEDICARE

## 2023-04-26 DIAGNOSIS — K74.60 CIRRHOSIS, NON-ALCOHOLIC (HCC): ICD-10-CM

## 2023-04-26 DIAGNOSIS — I10 ESSENTIAL HYPERTENSION: ICD-10-CM

## 2023-04-26 DIAGNOSIS — F33.0 MILD EPISODE OF RECURRENT MAJOR DEPRESSIVE DISORDER (HCC): ICD-10-CM

## 2023-04-26 DIAGNOSIS — R76.8 ANA POSITIVE: ICD-10-CM

## 2023-04-26 DIAGNOSIS — K75.4 CHRONIC AUTOIMMUNE HEPATITIS (HCC): ICD-10-CM

## 2023-04-26 PROCEDURE — 99999 PR NO CHARGE: CPT | Performed by: FAMILY MEDICINE

## 2023-04-27 ENCOUNTER — PATIENT OUTREACH (OUTPATIENT)
Dept: HEALTH INFORMATION MANAGEMENT | Facility: OTHER | Age: 73
End: 2023-04-27
Payer: MEDICARE

## 2023-04-27 NOTE — PROGRESS NOTES
"Assessment  Spoke with Madelyn for monthly outreach follow up. Madelyn states that she is \"doing okay.\" She states she recieved information from the social service referral. States she may not need to use it at this time. Denied any questions or concerns. Encouraged to call with any needs.     Education  Discussed social service referral    Care Plan  Continue pain management    Progress:  Progressing    Next outreach:  1 month     Chart reviewed for Quarterly Assessment, patient continues to qualify and benefit from PCM program  "

## 2023-04-27 NOTE — PROGRESS NOTES
4-27-23  @[id number] VISHAL spoke with CCM RN Alejandra Rosenberg to get her thoughts on SW closing Madelyn's CCM SW referral and if Alejandra was aware of anything else being needed from SW at this time. Alejandra shared with SW that she had no concerns with SW closing referral as  had provided resources for Madleyn, resources which Alejandra had recently spoken with Madelyn about.  @[phone] VISHAL called Madelyn's mobile phone number. VISHAL left message on voicemail letting Madelyn know that SW will be closing CCM SW referral, but that if Madelyn needed anything else, she could call SW. VISHAL provided contact info.  @[phone] VISHAL called Madelyn's home phone number. The number was not in service.  SW closed the referral.

## 2023-05-17 ENCOUNTER — TELEPHONE (OUTPATIENT)
Dept: HEALTH INFORMATION MANAGEMENT | Facility: OTHER | Age: 73
End: 2023-05-17
Payer: MEDICARE

## 2023-05-18 ENCOUNTER — HOSPITAL ENCOUNTER (OUTPATIENT)
Dept: RADIOLOGY | Facility: MEDICAL CENTER | Age: 73
End: 2023-05-18
Attending: INTERNAL MEDICINE
Payer: MEDICARE

## 2023-05-18 DIAGNOSIS — M81.0 SENILE OSTEOPOROSIS: ICD-10-CM

## 2023-05-18 PROCEDURE — 77080 DXA BONE DENSITY AXIAL: CPT

## 2023-05-24 ENCOUNTER — PATIENT OUTREACH (OUTPATIENT)
Dept: HEALTH INFORMATION MANAGEMENT | Facility: OTHER | Age: 73
End: 2023-05-24
Payer: MEDICARE

## 2023-05-24 DIAGNOSIS — I10 ESSENTIAL HYPERTENSION: ICD-10-CM

## 2023-05-24 DIAGNOSIS — R76.8 ANA POSITIVE: ICD-10-CM

## 2023-05-24 DIAGNOSIS — K75.4 CHRONIC AUTOIMMUNE HEPATITIS (HCC): ICD-10-CM

## 2023-05-24 DIAGNOSIS — K74.60 CIRRHOSIS, NON-ALCOHOLIC (HCC): ICD-10-CM

## 2023-05-24 DIAGNOSIS — F33.0 MILD EPISODE OF RECURRENT MAJOR DEPRESSIVE DISORDER (HCC): ICD-10-CM

## 2023-05-24 PROCEDURE — 99490 CHRNC CARE MGMT STAFF 1ST 20: CPT | Performed by: FAMILY MEDICINE

## 2023-05-25 NOTE — PROGRESS NOTES
Assessment  Spoke with Madelyn for monthly outreach follow up. Madelyn states that her arthritis is bothering her. She states she has an appointment in July, encouraged her to call and reschedule for sooner. She states she has question about medication for osteoporosis. Will assist in follow up with Dr. De Jesus. Discussed her current medications, frequent lab monitoring, and autoimmune hepatitis. Encouraged Madelyn to call with any questions or concerns. Discussed the Personal Care Management Program and Services.     Education  Discuss arthritis symptoms and follow up with Rheumatology  Dicussed medications and frequent lab monitoring     Plan of Care and Goals  Continue pain management    Barriers:  Knowledge deficit  Habits  Disease progression-Autoimmune hepatitis    Progress:  Progressing     Next outreach:  1 month

## 2023-06-07 ENCOUNTER — TELEPHONE (OUTPATIENT)
Dept: HEALTH INFORMATION MANAGEMENT | Facility: OTHER | Age: 73
End: 2023-06-07
Payer: MEDICARE

## 2023-06-07 NOTE — TELEPHONE ENCOUNTER
Called Madelyn to review her questions regarding osteoporosis medications. Per chart review prior osteoporosis medication increased her liver function tests, this posses a risk given her diagnosis of Autoimmune hepatitis. Madelyn verbalized understanding. She reported in increase in her pain and asked for assistance scheduling a PCP appointment. Scheduled for 6/14 at 09:40, arrival time 9:25.

## 2023-06-14 ENCOUNTER — OFFICE VISIT (OUTPATIENT)
Dept: MEDICAL GROUP | Facility: MEDICAL CENTER | Age: 73
End: 2023-06-14
Payer: MEDICARE

## 2023-06-14 VITALS
SYSTOLIC BLOOD PRESSURE: 142 MMHG | DIASTOLIC BLOOD PRESSURE: 74 MMHG | HEART RATE: 78 BPM | WEIGHT: 125.66 LBS | TEMPERATURE: 97.7 F | BODY MASS INDEX: 23.12 KG/M2 | OXYGEN SATURATION: 98 % | HEIGHT: 62 IN

## 2023-06-14 DIAGNOSIS — M19.071 ARTHRITIS OF FOOT, RIGHT: ICD-10-CM

## 2023-06-14 DIAGNOSIS — M19.072 OSTEOARTHRITIS OF LEFT ANKLE, UNSPECIFIED OSTEOARTHRITIS TYPE: ICD-10-CM

## 2023-06-14 DIAGNOSIS — K75.4 CHRONIC AUTOIMMUNE HEPATITIS (HCC): ICD-10-CM

## 2023-06-14 DIAGNOSIS — K22.70 BARRETT'S ESOPHAGUS WITHOUT DYSPLASIA: ICD-10-CM

## 2023-06-14 DIAGNOSIS — K29.30 CHRONIC SUPERFICIAL GASTRITIS WITHOUT BLEEDING: ICD-10-CM

## 2023-06-14 DIAGNOSIS — K74.60 CIRRHOSIS, NON-ALCOHOLIC (HCC): ICD-10-CM

## 2023-06-14 DIAGNOSIS — R10.13 EPIGASTRIC ABDOMINAL PAIN: ICD-10-CM

## 2023-06-14 DIAGNOSIS — H61.92 EARLOBE LESION, LEFT: ICD-10-CM

## 2023-06-14 PROBLEM — K22.719 BARRETT'S ESOPHAGUS WITH DYSPLASIA: Status: ACTIVE | Noted: 2022-07-29

## 2023-06-14 PROBLEM — Z87.19 HISTORY OF BARRETT'S ESOPHAGUS: Status: RESOLVED | Noted: 2021-05-13 | Resolved: 2023-06-14

## 2023-06-14 PROCEDURE — 3077F SYST BP >= 140 MM HG: CPT | Performed by: FAMILY MEDICINE

## 2023-06-14 PROCEDURE — 3078F DIAST BP <80 MM HG: CPT | Performed by: FAMILY MEDICINE

## 2023-06-14 PROCEDURE — 99214 OFFICE O/P EST MOD 30 MIN: CPT | Performed by: FAMILY MEDICINE

## 2023-06-14 RX ORDER — HYDROXYCHLOROQUINE SULFATE 200 MG/1
TABLET, FILM COATED ORAL
COMMUNITY
Start: 2023-06-14

## 2023-06-14 RX ORDER — PANTOPRAZOLE SODIUM 40 MG/1
40 TABLET, DELAYED RELEASE ORAL
Qty: 90 TABLET | Refills: 3 | Status: SHIPPED | OUTPATIENT
Start: 2023-06-14

## 2023-06-14 ASSESSMENT — FIBROSIS 4 INDEX: FIB4 SCORE: 1.6

## 2023-06-14 NOTE — PROGRESS NOTES
"Chief Complaint   Patient presents with    Arm Pain     Pt states the right arm pain has now radiated to the right wrist.     Foot Pain     Right foot pain has worsened to the point the pt is occasionally unable to walk on it.     Ear Pain     Pt reports left ear \"burning\". 30 minutes later the pt noticed scabs in the ear.        Subjective:     HPI:   DONAL BOOKER presents today with the followin. Cirrhosis, non-alcoholic (HCC)/Chronic autoimmune hepatitis (HCC)  Patient continues to treat with Imuran and Plaquenil.  Her numbers seem to have stabilized.  She will continue with the specialist.    2. Arthritis of foot, right/arthritis left ankle  Patient has pain in the midfoot likely arthritis.  X-ray ordered discussed and placed.  She also has similar pain in the left ankle.  X-ray order also discussed and placed.  She has been discussing with her rheumatologist her numerous areas of pain.  Her rheumatologist has discussed doing targeted injections.    3. Epigastric abdominal pain/Gómez's esophagus without dysplasia/Chronic superficial gastritis without bleeding  She continues pantoprazole.  It does not control her symptoms very well.  It does seem to be better than without the medication.    4. Earlobe lesion, left  She has a small ulceration on the left earlobe.  She was not using it currently.  It looks almost like a pressure sore.  No erythema or drainage currently.  If        Patient Active Problem List    Diagnosis Date Noted    Arthritis of foot, right 2023    Earlobe lesion, left 2023    Posterior neck pain 2022    Itching 2022    Cirrhosis, non-alcoholic (HCC) 2022    Left ventricular hypokinesis 2022    Gómez's esophagus without dysplasia 2022    Cutaneous horn 2022    Chronic autoimmune hepatitis (HCC) 2022    Ds DNA antibody positive 2022    Elevated sedimentation rate 2022    JARRETT positive 2022    Autoimmune " disease (HCC) 04/20/2022    Epigastric abdominal pain 04/20/2022    Aortic atherosclerosis (HCC) 03/01/2022    Myofascial pain on left side 11/30/2021    Muscle tenderness 11/30/2021    History of 2019 novel coronavirus disease (COVID-19) 11/11/2021    Mild episode of recurrent major depressive disorder (HCC) 08/24/2021    Bilateral radicular pain 01/20/2021    Muscle spasm 01/20/2021    Mixed hyperlipidemia 01/20/2021    Vitamin D deficiency 01/20/2021    Intervertebral cervical disc disorder with myelopathy, cervical region 11/29/2017    Transaminitis 12/19/2016    Hyperglobulinemia 12/19/2016    Osteoporosis 12/18/2016    Weakness 07/31/2015    Stress-induced cardiomyopathy 09/30/2014    Situational anxiety 09/29/2014    Microscopic colitis 09/28/2014    Essential hypertension 09/26/2014    Insomnia 09/26/2014    Gastritis 09/25/2014       Current medicines (including changes today)  Current Outpatient Medications   Medication Sig Dispense Refill    pantoprazole (PROTONIX) 40 MG Tablet Delayed Response Take 1 Tablet by mouth every day. 90 Tablet 3    ALPRAZolam (XANAX) 0.5 MG Tab Take 1 Tablet by mouth 3 times a day as needed for Sleep or Anxiety for up to 90 days. 90 tablets is a 30 day quantity 90 Tablet 2    hydroxychloroquine (PLAQUENIL) 200 MG Tab       amLODIPine (NORVASC) 5 MG Tab TAKE 1 TABLET BY MOUTH EVERY DAY 90 Tablet 3    atorvastatin (LIPITOR) 20 MG Tab TAKE 1 TABLET BY MOUTH EVERYDAY AT BEDTIME 100 Tablet 3    Nutritional Supplements (ENSURE HIGH PROTEIN) Liquid Take 1 Can by mouth 2 times a day. 29923 mL 11    famotidine (PEPCID) 20 MG Tab TAKE 1 TABLET BY MOUTH TWICE A  Tablet 2    losartan (COZAAR) 100 MG Tab TAKE 1 TABLET BY MOUTH EVERY  Tablet 3    azaTHIOprine (IMURAN) 50 MG Tab Take 50 mg by mouth 2 times a day.      Vitamin E 268 MG (400 UNIT) Cap Take  by mouth every day.      Ascorbic Acid (VITAMIN C) 1000 MG Tab Take 2,000 mg by mouth every day.      Multiple Vitamin Tab  "Take  by mouth every day.      aluminum chloride (DRYSOL) 20 % external solution Apply to axillary regions nightly (Patient taking differently: at bedtime as needed. Apply to axillary regions nightly) 60 mL 4    Cholecalciferol (VITAMIN D3) 2000 UNIT Cap Take  by mouth every day. Patient takes 1,000 units a day       No current facility-administered medications for this visit.       Allergies   Allergen Reactions    Codeine Vomiting     [id number]    Cefdinir      Rash    Hydrocodone Vomiting    Oxycodone Vomiting    Tramadol Vomiting     itching       ROS: As per HPI       Objective:     BP (!) 142/74 (BP Location: Right arm, Patient Position: Sitting, BP Cuff Size: Small adult)   Pulse 78   Temp 36.5 °C (97.7 °F) (Temporal)   Ht 1.575 m (5' 2\")   Wt 57 kg (125 lb 10.6 oz)   SpO2 98%  Body mass index is 22.98 kg/m².    Physical Exam:  Constitutional: Well-developed and well-nourished. Not diaphoretic. No distress. Lucid and fluent.  Skin: Skin is warm and dry. No rash noted.  Head: Atraumatic without lesions.  Eyes: Conjunctivae and extraocular motions are normal. Pupils are equal, round, and reactive to light. No scleral icterus.   Ears:  External ears unremarkable.   Neck: Supple, trachea midline. No thyromegaly present. No cervical or supraclavicular lymphadenopathy. No JVD or carotid bruits appreciated  Cardiovascular: Regular rate and rhythm.  Normal S1, S2 without murmur appreciated.  Chest: Effort normal. Clear to auscultation throughout. No adventitious sounds.   Abdomen: Soft, non tender, and without distention. Active bowel sounds in all four quadrants. No rebound, guarding, masses or hepatosplenomegaly.  Extremities: No cyanosis, clubbing, erythema, nor edema.   Neurological: Alert and oriented x 3.  No tremor, movements symmetric.  Psychiatric:  Behavior, mood, and affect are appropriate.       Assessment and Plan:     72 y.o. female with the following issues:    1. Cirrhosis, non-alcoholic (HCC)   "      2. Chronic autoimmune hepatitis (HCC)        3. Arthritis of foot, right  DX-FOOT-COMPLETE 3+ RIGHT      4. Epigastric abdominal pain  pantoprazole (PROTONIX) 40 MG Tablet Delayed Response      5. Gómez's esophagus without dysplasia  pantoprazole (PROTONIX) 40 MG Tablet Delayed Response      6. Chronic superficial gastritis without bleeding  pantoprazole (PROTONIX) 40 MG Tablet Delayed Response      7. Earlobe lesion, left        8. Osteoarthritis of left ankle, unspecified osteoarthritis type  DX-ANKLE 3+ VIEWS LEFT            Followup: Return in about 6 months (around 12/14/2023), or if symptoms worsen or fail to improve.

## 2023-06-26 ENCOUNTER — PATIENT OUTREACH (OUTPATIENT)
Dept: HEALTH INFORMATION MANAGEMENT | Facility: OTHER | Age: 73
End: 2023-06-26
Payer: MEDICARE

## 2023-06-26 DIAGNOSIS — K75.4 CHRONIC AUTOIMMUNE HEPATITIS (HCC): ICD-10-CM

## 2023-06-26 DIAGNOSIS — I10 ESSENTIAL HYPERTENSION: ICD-10-CM

## 2023-06-26 DIAGNOSIS — R76.8 ANA POSITIVE: ICD-10-CM

## 2023-06-26 DIAGNOSIS — K74.60 CIRRHOSIS, NON-ALCOHOLIC (HCC): ICD-10-CM

## 2023-06-26 DIAGNOSIS — F33.0 MILD EPISODE OF RECURRENT MAJOR DEPRESSIVE DISORDER (HCC): ICD-10-CM

## 2023-06-26 PROCEDURE — 99490 CHRNC CARE MGMT STAFF 1ST 20: CPT | Performed by: FAMILY MEDICINE

## 2023-06-26 NOTE — PROGRESS NOTES
"Assessment  Spoke with Madelyn for monthly outreach follow up. Madelyn states she is doing \"pretty good.\" She states her pain is a little better. She has good days and bad days. Discussed her last PCP appointment. She reports that her weight is down to 125 pounds. She states at her heaviest she was 200 pounds. She reports that Dr. De Jesus encouraged her to eat more. She has purchased Ensure. Discussed nutrition. Gave information on the [employer] food [facility]. Discussed lab work and next follow up appointments. Madelyn denies any questions or concerns. Encouraged to call with any needs.     Education  Discussed nutrition  Discussed lab work   Discussed up coming follow up appointments     Plan of Care and Goals  Continue pain management    Barriers:  Knowledge of healthcare  Progression of disease process-lupus/autoimmune liver cirrhosis     Progress:  Progressing    Next outreach:  1 month                         "

## 2023-07-20 DIAGNOSIS — F41.8 SITUATIONAL ANXIETY: ICD-10-CM

## 2023-07-20 RX ORDER — ALPRAZOLAM 0.5 MG/1
TABLET ORAL
Qty: 90 TABLET | Refills: 2 | Status: SHIPPED | OUTPATIENT
Start: 2023-07-20 | End: 2023-10-18

## 2023-07-20 NOTE — TELEPHONE ENCOUNTER
Received request via: Patient    Was the patient seen in the last year in this department? Yes    Does the patient have an active prescription (recently filled or refills available) for medication(s) requested? No    Does the patient have snf Plus and need 100 day supply (blood pressure, diabetes and cholesterol meds only)? Medication is not for cholesterol, blood pressure or diabetes

## 2023-07-21 NOTE — TELEPHONE ENCOUNTER
Patient last seen in office June 14.  She is well within the required timeframe for renewal of this kind of controlled substance.  PDMP review shows no inconsistencies.  I would expect the patient to be out of the medication today or tomorrow.  Renewal is sent to her pharmacy.  No adverse events have been reported or observed.

## 2023-07-24 ENCOUNTER — PATIENT OUTREACH (OUTPATIENT)
Dept: HEALTH INFORMATION MANAGEMENT | Facility: OTHER | Age: 73
End: 2023-07-24
Payer: MEDICARE

## 2023-07-24 DIAGNOSIS — K74.60 CIRRHOSIS, NON-ALCOHOLIC (HCC): ICD-10-CM

## 2023-07-24 DIAGNOSIS — F33.0 MILD EPISODE OF RECURRENT MAJOR DEPRESSIVE DISORDER (HCC): ICD-10-CM

## 2023-07-24 DIAGNOSIS — I10 ESSENTIAL HYPERTENSION: ICD-10-CM

## 2023-07-24 DIAGNOSIS — K75.4 CHRONIC AUTOIMMUNE HEPATITIS (HCC): ICD-10-CM

## 2023-07-24 DIAGNOSIS — R76.8 ANA POSITIVE: ICD-10-CM

## 2023-07-24 PROCEDURE — 99999 PR NO CHARGE: CPT | Performed by: FAMILY MEDICINE

## 2023-07-24 NOTE — PROGRESS NOTES
Assessment  Spoke with Madelyn for monthly outreach follow up. Madelyn states she is sad because her aunt is no doing well and my pass soon. Allowed Madelyn to verbalize her feelings. She states other than grief for her aunt, she is doing well. She states her oldest grandson got a job and she is very proud of him. She states she still struggles to eat. Discussed small frequent meals and Ensure. Madelyn denies any questions or concerns at this time. Encouraged to call with any needs.      Education  Discussed nutrition/small frequent meals.     Plan of Care and Goals  Continue pain management  Continue to assess for the need for community resources     Barriers:  Age  Progression of disease process-lupus/auto-immune hepatitis  Knowledge of healthcare  Habits     Progress:  Progressing     Next outreach:   1 month     Chart reviewed for Quarterly Assessment, patient continues to qualify and benefit from PCM program.

## 2023-08-10 ENCOUNTER — HOSPITAL ENCOUNTER (OUTPATIENT)
Dept: LAB | Facility: MEDICAL CENTER | Age: 73
End: 2023-08-10
Attending: INTERNAL MEDICINE
Payer: MEDICARE

## 2023-08-10 LAB
ALBUMIN SERPL BCP-MCNC: 4.7 G/DL (ref 3.2–4.9)
ALBUMIN/GLOB SERPL: 1.4 G/DL
ALP SERPL-CCNC: 62 U/L (ref 30–99)
ALT SERPL-CCNC: 20 U/L (ref 2–50)
ANION GAP SERPL CALC-SCNC: 14 MMOL/L (ref 7–16)
APPEARANCE UR: CLEAR
AST SERPL-CCNC: 30 U/L (ref 12–45)
BASOPHILS # BLD AUTO: 1.2 % (ref 0–1.8)
BASOPHILS # BLD: 0.05 K/UL (ref 0–0.12)
BILIRUB SERPL-MCNC: 0.7 MG/DL (ref 0.1–1.5)
BILIRUB UR QL STRIP.AUTO: NEGATIVE
BUN SERPL-MCNC: 8 MG/DL (ref 8–22)
C3 SERPL-MCNC: 122.7 MG/DL (ref 87–200)
C4 SERPL-MCNC: 25 MG/DL (ref 19–52)
CALCIUM ALBUM COR SERPL-MCNC: 9.7 MG/DL (ref 8.5–10.5)
CALCIUM SERPL-MCNC: 10.3 MG/DL (ref 8.5–10.5)
CHLORIDE SERPL-SCNC: 102 MMOL/L (ref 96–112)
CO2 SERPL-SCNC: 22 MMOL/L (ref 20–33)
COLOR UR: YELLOW
CREAT SERPL-MCNC: 0.7 MG/DL (ref 0.5–1.4)
CREAT UR-MCNC: 86.68 MG/DL
EOSINOPHIL # BLD AUTO: 0.07 K/UL (ref 0–0.51)
EOSINOPHIL NFR BLD: 1.7 % (ref 0–6.9)
ERYTHROCYTE [DISTWIDTH] IN BLOOD BY AUTOMATED COUNT: 47.7 FL (ref 35.9–50)
GFR SERPLBLD CREATININE-BSD FMLA CKD-EPI: 93 ML/MIN/1.73 M 2
GLOBULIN SER CALC-MCNC: 3.4 G/DL (ref 1.9–3.5)
GLUCOSE SERPL-MCNC: 103 MG/DL (ref 65–99)
GLUCOSE UR STRIP.AUTO-MCNC: NEGATIVE MG/DL
HCT VFR BLD AUTO: 46 % (ref 37–47)
HGB BLD-MCNC: 15.9 G/DL (ref 12–16)
IMM GRANULOCYTES # BLD AUTO: 0.02 K/UL (ref 0–0.11)
IMM GRANULOCYTES NFR BLD AUTO: 0.5 % (ref 0–0.9)
KETONES UR STRIP.AUTO-MCNC: NEGATIVE MG/DL
LEUKOCYTE ESTERASE UR QL STRIP.AUTO: NEGATIVE
LYMPHOCYTES # BLD AUTO: 1.26 K/UL (ref 1–4.8)
LYMPHOCYTES NFR BLD: 29.8 % (ref 22–41)
MCH RBC QN AUTO: 34 PG (ref 27–33)
MCHC RBC AUTO-ENTMCNC: 34.6 G/DL (ref 32.2–35.5)
MCV RBC AUTO: 98.3 FL (ref 81.4–97.8)
MICRO URNS: NORMAL
MONOCYTES # BLD AUTO: 0.44 K/UL (ref 0–0.85)
MONOCYTES NFR BLD AUTO: 10.4 % (ref 0–13.4)
NEUTROPHILS # BLD AUTO: 2.39 K/UL (ref 1.82–7.42)
NEUTROPHILS NFR BLD: 56.4 % (ref 44–72)
NITRITE UR QL STRIP.AUTO: NEGATIVE
NRBC # BLD AUTO: 0 K/UL
NRBC BLD-RTO: 0 /100 WBC (ref 0–0.2)
PH UR STRIP.AUTO: 6.5 [PH] (ref 5–8)
PLATELET # BLD AUTO: 270 K/UL (ref 164–446)
PMV BLD AUTO: 9.9 FL (ref 9–12.9)
POTASSIUM SERPL-SCNC: 3.8 MMOL/L (ref 3.6–5.5)
PROT SERPL-MCNC: 8.1 G/DL (ref 6–8.2)
PROT UR QL STRIP: NEGATIVE MG/DL
PROT UR-MCNC: 9 MG/DL (ref 0–15)
PROT/CREAT UR: 104 MG/G (ref 10–107)
RBC # BLD AUTO: 4.68 M/UL (ref 4.2–5.4)
RBC UR QL AUTO: NEGATIVE
SODIUM SERPL-SCNC: 138 MMOL/L (ref 135–145)
SP GR UR STRIP.AUTO: 1.02
TSH SERPL DL<=0.005 MIU/L-ACNC: 0.71 UIU/ML (ref 0.38–5.33)
UROBILINOGEN UR STRIP.AUTO-MCNC: 0.2 MG/DL
WBC # BLD AUTO: 4.2 K/UL (ref 4.8–10.8)

## 2023-08-10 PROCEDURE — 86256 FLUORESCENT ANTIBODY TITER: CPT

## 2023-08-10 PROCEDURE — 86160 COMPLEMENT ANTIGEN: CPT | Mod: 91

## 2023-08-10 PROCEDURE — 82570 ASSAY OF URINE CREATININE: CPT

## 2023-08-10 PROCEDURE — 36415 COLL VENOUS BLD VENIPUNCTURE: CPT

## 2023-08-10 PROCEDURE — 84156 ASSAY OF PROTEIN URINE: CPT

## 2023-08-10 PROCEDURE — 80053 COMPREHEN METABOLIC PANEL: CPT

## 2023-08-10 PROCEDURE — 85025 COMPLETE CBC W/AUTO DIFF WBC: CPT

## 2023-08-10 PROCEDURE — 84443 ASSAY THYROID STIM HORMONE: CPT

## 2023-08-10 PROCEDURE — 81003 URINALYSIS AUTO W/O SCOPE: CPT

## 2023-08-10 PROCEDURE — 86225 DNA ANTIBODY NATIVE: CPT

## 2023-08-12 LAB — DSDNA AB TITR SER CLIF: NORMAL {TITER}

## 2023-08-13 LAB — DSDNA IGG TITR SER CLIF: ABNORMAL {TITER}

## 2023-08-17 ENCOUNTER — APPOINTMENT (OUTPATIENT)
Dept: RADIOLOGY | Facility: MEDICAL CENTER | Age: 73
End: 2023-08-17
Attending: FAMILY MEDICINE
Payer: MEDICARE

## 2023-08-28 ENCOUNTER — PATIENT OUTREACH (OUTPATIENT)
Dept: HEALTH INFORMATION MANAGEMENT | Facility: OTHER | Age: 73
End: 2023-08-28
Payer: MEDICARE

## 2023-08-28 DIAGNOSIS — K75.4 CHRONIC AUTOIMMUNE HEPATITIS (HCC): ICD-10-CM

## 2023-08-28 DIAGNOSIS — F33.0 MILD EPISODE OF RECURRENT MAJOR DEPRESSIVE DISORDER (HCC): ICD-10-CM

## 2023-08-28 DIAGNOSIS — K74.60 CIRRHOSIS, NON-ALCOHOLIC (HCC): ICD-10-CM

## 2023-08-28 DIAGNOSIS — I10 ESSENTIAL HYPERTENSION: ICD-10-CM

## 2023-08-28 DIAGNOSIS — R76.8 ANA POSITIVE: ICD-10-CM

## 2023-08-29 NOTE — PROGRESS NOTES
Assessment  Spoke with Madelyn for monthly outreach follow up. Madelyn states she is not doing good, she is in a lot of pain. She reports an increase in her pain as well as cold symptoms the last few weeks. Offered to assist in making a PCP appointment. She declined at this time. Madelyn asked for nutrition information. She struggles with gastritis and to keep her weight up. Discussed the possibility of meeting with a nutritionist/dietitian. Madelyn declined. She feels is difficult to get to appointment while caring for her grandchildren. Offered to mail out information, Madelyn agreed. She then asked to end the call. Encouraged Madelyn to call with any other questions or concerns.     Education  Discussed nutrition, Madelyn asked for nutrition resources to be mailed     Plan of Care and Goals  Continue pain management     Barriers:  Age  Progression of disease process  Knowledge of healthcare  Habits     Progress:  Progressing     Next outreach:  1 month

## 2023-09-22 ENCOUNTER — PATIENT OUTREACH (OUTPATIENT)
Dept: HEALTH INFORMATION MANAGEMENT | Facility: OTHER | Age: 73
End: 2023-09-22
Payer: MEDICARE

## 2023-09-22 DIAGNOSIS — I10 ESSENTIAL HYPERTENSION: ICD-10-CM

## 2023-09-22 DIAGNOSIS — R76.8 ANA POSITIVE: ICD-10-CM

## 2023-09-22 DIAGNOSIS — K74.60 CIRRHOSIS, NON-ALCOHOLIC (HCC): ICD-10-CM

## 2023-09-22 DIAGNOSIS — K75.4 CHRONIC AUTOIMMUNE HEPATITIS (HCC): ICD-10-CM

## 2023-09-22 DIAGNOSIS — F33.0 MILD EPISODE OF RECURRENT MAJOR DEPRESSIVE DISORDER (HCC): ICD-10-CM

## 2023-09-22 PROCEDURE — 99490 CHRNC CARE MGMT STAFF 1ST 20: CPT | Performed by: FAMILY MEDICINE

## 2023-09-25 NOTE — PROGRESS NOTES
"Assessment  Spoke with Madelyn for monthly outreach follow up. Madelyn stated, \"I'm feeling pretty good.\" She reports the arthritis in her left foot is getting stiff but it also comes and goes. She states she received the materials mailed out on nutritious snacks. She states she has gained 4 pounds. She is have three small meals a day with two snack in between. She states she is feeling better. Discussed caring for her grandsons and balancing her own health needs. Madelyn denied any needs at this time. Encouraged to call with any questions or concerns.     Education  Discussed nutritional information mailed out    Plan of Care and Goals  Continue pain management  Continue to monitor for the need of community resources     Barriers:  Age  Progression of disease process  Knowledge of healthcare  Primary caregiver for grandchildren    Progress:  Progressing    Next outreach:  1 month                            "

## 2023-10-20 DIAGNOSIS — R10.10 PAIN OF UPPER ABDOMEN: ICD-10-CM

## 2023-10-20 RX ORDER — FAMOTIDINE 20 MG/1
TABLET, FILM COATED ORAL
Qty: 180 TABLET | Refills: 2 | Status: SHIPPED | OUTPATIENT
Start: 2023-10-20

## 2023-10-25 DIAGNOSIS — F41.8 SITUATIONAL ANXIETY: ICD-10-CM

## 2023-10-25 RX ORDER — ALPRAZOLAM 0.5 MG/1
0.5 TABLET ORAL NIGHTLY PRN
COMMUNITY
End: 2023-10-25

## 2023-10-25 RX ORDER — ALPRAZOLAM 0.5 MG/1
0.5 TABLET ORAL 3 TIMES DAILY PRN
Qty: 90 TABLET | Refills: 1 | Status: SHIPPED | OUTPATIENT
Start: 2023-10-25 | End: 2023-12-14 | Stop reason: SDUPTHER

## 2023-10-25 NOTE — TELEPHONE ENCOUNTER
Received request via: Patient    Was the patient seen in the last year in this department? Yes    Does the patient have an active prescription (recently filled or refills available) for medication(s) requested? No    Does the patient have snf Plus and need 100 day supply (blood pressure, diabetes and cholesterol meds only)? NO

## 2023-10-26 NOTE — TELEPHONE ENCOUNTER
Last seen in office 4 months ago.  She is within the 180-day requirement.  PDMP review shows no inconsistencies and the alprazolam is renewed.

## 2023-11-01 ENCOUNTER — PATIENT OUTREACH (OUTPATIENT)
Dept: HEALTH INFORMATION MANAGEMENT | Facility: OTHER | Age: 73
End: 2023-11-01
Payer: MEDICARE

## 2023-11-01 DIAGNOSIS — F33.0 MILD EPISODE OF RECURRENT MAJOR DEPRESSIVE DISORDER (HCC): ICD-10-CM

## 2023-11-01 DIAGNOSIS — I10 ESSENTIAL HYPERTENSION: ICD-10-CM

## 2023-11-01 DIAGNOSIS — R76.8 ANA POSITIVE: ICD-10-CM

## 2023-11-01 DIAGNOSIS — K75.4 CHRONIC AUTOIMMUNE HEPATITIS (HCC): ICD-10-CM

## 2023-11-01 DIAGNOSIS — K74.60 CIRRHOSIS, NON-ALCOHOLIC (HCC): ICD-10-CM

## 2023-11-01 PROCEDURE — 99999 PR NO CHARGE: CPT | Performed by: FAMILY MEDICINE

## 2023-11-03 NOTE — PROGRESS NOTES
Assessment  Spoke with Madelyn for monthly outreach follow up. Madelyn sprained her foot and was seen at the [facility] ER. She denies a fracture and reports she is slowly improving. She reports that overall she feels weaker, offered to assist in scheduling a PCP appointment. Madelyn denied and states she would keep her 12/14 PCP appointment. She has an endoscopy scheduled on 12/11. Reviewed her medications. Discussed liver function and monitoring her lab work. Madelyn denied any needs at this time. Encouraged to call with any question or concerns.     Education  Discussed lab work  Discussed medications  Discussed liver function     Plan of Care and Goals  Continue pain management  Continue to assess for community resources     Barriers:  Age  Progression of disease process  Knowledge of healthcare  Legal guardian of Great-grandson's    Progress:  Progressing     Next outreach:  1 month    Chart reviewed for Quarterly Assessment, patient continues to qualify and benefit from PCM program.

## 2023-12-01 ENCOUNTER — PATIENT OUTREACH (OUTPATIENT)
Dept: HEALTH INFORMATION MANAGEMENT | Facility: OTHER | Age: 73
End: 2023-12-01
Payer: MEDICARE

## 2023-12-01 DIAGNOSIS — R76.8 ANA POSITIVE: ICD-10-CM

## 2023-12-01 DIAGNOSIS — I10 ESSENTIAL HYPERTENSION: ICD-10-CM

## 2023-12-01 DIAGNOSIS — K75.4 CHRONIC AUTOIMMUNE HEPATITIS (HCC): ICD-10-CM

## 2023-12-01 DIAGNOSIS — F33.0 MILD EPISODE OF RECURRENT MAJOR DEPRESSIVE DISORDER (HCC): ICD-10-CM

## 2023-12-01 DIAGNOSIS — K74.60 CIRRHOSIS, NON-ALCOHOLIC (HCC): ICD-10-CM

## 2023-12-01 PROCEDURE — 99999 PR NO CHARGE: CPT | Performed by: FAMILY MEDICINE

## 2023-12-01 NOTE — PROGRESS NOTES
Assessment  Spoke with Madelyn for monthly outreach follow up. Madelyn states she has a difficult few weeks but slept very well the has few days. She is feeling much better today. Discussed improving sleep. Discussed continuing small frequent meals. Reviewed next PCP appointment on 12/14. Madelyn denies any needs at this time. Encouraged to call with any questions or concerns.     Education  Discussed sleep and small frequent meals    Plan of Care and Goals  Continue pain management  Continue to assess for the need of community resources     Barriers:  Age  Knowledge of healthcare   Progression of disease process-Autoimmune hepatitis       Progress:  Progressing     Next outreach:  1 month

## 2023-12-14 ENCOUNTER — OFFICE VISIT (OUTPATIENT)
Dept: MEDICAL GROUP | Facility: MEDICAL CENTER | Age: 73
End: 2023-12-14
Payer: MEDICARE

## 2023-12-14 VITALS
DIASTOLIC BLOOD PRESSURE: 70 MMHG | RESPIRATION RATE: 18 BRPM | OXYGEN SATURATION: 97 % | WEIGHT: 128 LBS | SYSTOLIC BLOOD PRESSURE: 140 MMHG | HEIGHT: 62 IN | TEMPERATURE: 98 F | BODY MASS INDEX: 23.55 KG/M2 | HEART RATE: 88 BPM

## 2023-12-14 DIAGNOSIS — I10 ESSENTIAL HYPERTENSION: ICD-10-CM

## 2023-12-14 DIAGNOSIS — F41.8 SITUATIONAL ANXIETY: ICD-10-CM

## 2023-12-14 DIAGNOSIS — M32.19 SYSTEMIC LUPUS ERYTHEMATOSUS (SLE) WITH MULTISYSTEM INVOLVEMENT (HCC): ICD-10-CM

## 2023-12-14 DIAGNOSIS — F43.23 SITUATIONAL MIXED ANXIETY AND DEPRESSIVE DISORDER: ICD-10-CM

## 2023-12-14 PROCEDURE — 3078F DIAST BP <80 MM HG: CPT | Performed by: FAMILY MEDICINE

## 2023-12-14 PROCEDURE — 3077F SYST BP >= 140 MM HG: CPT | Performed by: FAMILY MEDICINE

## 2023-12-14 PROCEDURE — 99213 OFFICE O/P EST LOW 20 MIN: CPT | Performed by: FAMILY MEDICINE

## 2023-12-14 RX ORDER — ALPRAZOLAM 0.5 MG/1
0.5 TABLET ORAL 3 TIMES DAILY PRN
Qty: 90 TABLET | Refills: 2 | Status: SHIPPED | OUTPATIENT
Start: 2023-12-14 | End: 2024-03-13

## 2023-12-14 RX ORDER — SERTRALINE HYDROCHLORIDE 25 MG/1
25 TABLET, FILM COATED ORAL
Qty: 90 TABLET | Refills: 3 | Status: SHIPPED | OUTPATIENT
Start: 2023-12-14

## 2023-12-14 RX ORDER — LOSARTAN POTASSIUM 100 MG/1
100 TABLET ORAL DAILY
Qty: 100 TABLET | Refills: 3 | Status: SHIPPED | OUTPATIENT
Start: 2023-12-14

## 2023-12-14 ASSESSMENT — PATIENT HEALTH QUESTIONNAIRE - PHQ9
7. TROUBLE CONCENTRATING ON THINGS, SUCH AS READING THE NEWSPAPER OR WATCHING TELEVISION: NEARLY EVERY DAY
9. THOUGHTS THAT YOU WOULD BE BETTER OFF DEAD, OR OF HURTING YOURSELF: NOT AT ALL
3. TROUBLE FALLING OR STAYING ASLEEP OR SLEEPING TOO MUCH: NEARLY EVERY DAY
6. FEELING BAD ABOUT YOURSELF - OR THAT YOU ARE A FAILURE OR HAVE LET YOURSELF OR YOUR FAMILY DOWN: NOT AL ALL
SUM OF ALL RESPONSES TO PHQ9 QUESTIONS 1 AND 2: 1
1. LITTLE INTEREST OR PLEASURE IN DOING THINGS: NOT AT ALL
SUM OF ALL RESPONSES TO PHQ QUESTIONS 1-9: 11
4. FEELING TIRED OR HAVING LITTLE ENERGY: SEVERAL DAYS
8. MOVING OR SPEAKING SO SLOWLY THAT OTHER PEOPLE COULD HAVE NOTICED. OR THE OPPOSITE, BEING SO FIGETY OR RESTLESS THAT YOU HAVE BEEN MOVING AROUND A LOT MORE THAN USUAL: NOT AT ALL
2. FEELING DOWN, DEPRESSED, IRRITABLE, OR HOPELESS: SEVERAL DAYS
5. POOR APPETITE OR OVEREATING: NEARLY EVERY DAY

## 2023-12-14 ASSESSMENT — FIBROSIS 4 INDEX: FIB4 SCORE: 1.76

## 2023-12-14 NOTE — PROGRESS NOTES
Chief Complaint   Patient presents with    Hepatic Disease    Arthritis    Hypertension    Anxiety       Subjective:     HPI:   Karen Dimas presents today with the followin. Essential hypertension  HTN - Chronic condition stable. Currently taking all meds as directed.   She is not taking baby aspirin daily.   She  is occasionally  monitoring BP at home.  Has generally been fairly good in the 130s over 70s to 80s.  Reasonably good here today.  Denies symptoms low BP: light-headed, tunnel-vision, unusual fatigue.   Denies symptoms high BP:pounding headache, visual changes, palpitations, flushed face.   Denies medicine side effects: unusual fatigue, slow heartbeat, foot/leg swelling, cough.    2. Situational anxiety/Situational mixed anxiety and depressive disorder  Patient has very severe family issues.  Her daughter has very significant legal issues.  One of her sons is now more in her life which has been very helpful to her.  She takes care of of her granddaughters children live with her.  She feels fortunate that they are with her.  In general she is having some increased anxiety and depression.  She is having more difficulty with sleep.  She feels the alprazolam is helpful but there are times when it is not adequate.  Discussed using Tylenol with diphenhydramine.  Also discussed adding low-dose sertraline to the regimen.  Patient avoids all alcohol.  PDMP review shows no inconsistencies.  The alprazolam is renewed.  Sertraline is prescribed.  She will also try the Tylenol PM.    3. Systemic lupus erythematosus (SLE) with multisystem involvement (HCC)  Her situation has improved.  She is tolerating the Imuran and hydroxychloroquine that are prescribed by the rheumatologist.  She feels these have made a significant improvement in her general health.  Her pain is far more manageable on this regimen than it was in the past.  She will continue to see her rheumatologist every 6 months.  Recent lab  results that were ordered by rheumatology and obtained in June are reviewed and discussed with the patient.        Patient Active Problem List    Diagnosis Date Noted    Systemic lupus erythematosus (SLE) with multisystem involvement (HCC) 12/14/2023    Situational mixed anxiety and depressive disorder 12/14/2023    Arthritis of foot, right 06/14/2023    Earlobe lesion, left 06/14/2023    Posterior neck pain 12/16/2022    Itching 12/16/2022    Cirrhosis, non-alcoholic (HCC) 07/29/2022    Left ventricular hypokinesis 07/29/2022    Gómez's esophagus without dysplasia 07/29/2022    Cutaneous horn 07/29/2022    Chronic autoimmune hepatitis (HCC) 05/16/2022    Ds DNA antibody positive 05/16/2022    Elevated sedimentation rate 04/20/2022    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    Autoimmune disease (HCC) 04/20/2022    Epigastric abdominal pain 04/20/2022    Aortic atherosclerosis (HCC) 03/01/2022    Myofascial pain on left side 11/30/2021    Muscle tenderness 11/30/2021    History of 2019 novel coronavirus disease (COVID-19) 11/11/2021    Mild episode of recurrent major depressive disorder (HCC) 08/24/2021    Bilateral radicular pain 01/20/2021    Muscle spasm 01/20/2021    Mixed hyperlipidemia 01/20/2021    Vitamin D deficiency 01/20/2021    Intervertebral cervical disc disorder with myelopathy, cervical region 11/29/2017    Transaminitis 12/19/2016    Hyperglobulinemia 12/19/2016    Osteoporosis 12/18/2016    Weakness 07/31/2015    Stress-induced cardiomyopathy 09/30/2014    Situational anxiety 09/29/2014    Microscopic colitis 09/28/2014    Essential hypertension 09/26/2014    Insomnia 09/26/2014    Gastritis 09/25/2014       Current medicines (including changes today)  Current Outpatient Medications   Medication Sig Dispense Refill    ALPRAZolam (XANAX) 0.5 MG Tab Take 1 Tablet by mouth 3 times a day as needed for Sleep or Anxiety for up to 90 days. 90 tablets is a 30-day quantity. 90 Tablet 2    losartan (COZAAR) 100 MG Tab  "Take 1 Tablet by mouth every day. 100 Tablet 3    sertraline (ZOLOFT) 25 MG tablet Take 1 Tablet by mouth at bedtime. 90 Tablet 3    famotidine (PEPCID) 20 MG Tab TAKE 1 TABLET BY MOUTH TWICE A  Tablet 2    hydroxychloroquine (PLAQUENIL) 200 MG Tab       pantoprazole (PROTONIX) 40 MG Tablet Delayed Response Take 1 Tablet by mouth every day. 90 Tablet 3    amLODIPine (NORVASC) 5 MG Tab TAKE 1 TABLET BY MOUTH EVERY DAY 90 Tablet 3    atorvastatin (LIPITOR) 20 MG Tab TAKE 1 TABLET BY MOUTH EVERYDAY AT BEDTIME 100 Tablet 3    Nutritional Supplements (ENSURE HIGH PROTEIN) Liquid Take 1 Can by mouth 2 times a day. 98088 mL 11    azaTHIOprine (IMURAN) 50 MG Tab Take 50 mg by mouth 2 times a day.      Vitamin E 268 MG (400 UNIT) Cap Take  by mouth every day.      Ascorbic Acid (VITAMIN C) 1000 MG Tab Take 2,000 mg by mouth every day.      Multiple Vitamin Tab Take  by mouth every day.      aluminum chloride (DRYSOL) 20 % external solution Apply to axillary regions nightly (Patient taking differently: at bedtime as needed. Apply to axillary regions nightly) 60 mL 4    Cholecalciferol (VITAMIN D3) 2000 UNIT Cap Take  by mouth every day. Patient takes 1,000 units a day       No current facility-administered medications for this visit.       Allergies   Allergen Reactions    Codeine Vomiting     [id number]    Cefdinir      Rash    Oxycodone Vomiting    Tramadol Vomiting     itching       ROS: As per HPI       Objective:     BP (!) 140/70   Pulse 88   Temp 36.7 °C (98 °F)   Resp 18   Ht 1.575 m (5' 2\")   Wt 58.1 kg (128 lb)   SpO2 97%  Body mass index is 23.41 kg/m².    Physical Exam:  Constitutional: Well-developed and well-nourished. Not diaphoretic. No distress. Lucid and fluent.  Skin: Skin is warm and dry. No rash noted.  Head: Atraumatic without lesions.  Eyes: Conjunctivae and extraocular motions are normal. Pupils are equal, round, and reactive to light. No scleral icterus.   Ears:  External ears unremarkable. "   Neck: Supple, trachea midline. No thyromegaly present. No cervical or supraclavicular lymphadenopathy. No JVD or carotid bruits appreciated  Cardiovascular: Regular rate and rhythm.  Normal S1, S2 without murmur appreciated.  Chest: Effort normal. Clear to auscultation throughout. No adventitious sounds.   Abdomen: Soft, non tender, and without distention. Active bowel sounds in all four quadrants. No rebound, guarding, masses or hepatosplenomegaly.  Extremities: No cyanosis, clubbing, erythema, nor edema.   Neurological: Alert and oriented x 3.  No tremor appreciated today.  Psychiatric:  Behavior, mood, and affect are appropriate.       Assessment and Plan:     73 y.o. female with the following issues:    1. Essential hypertension  losartan (COZAAR) 100 MG Tab      2. Situational anxiety  ALPRAZolam (XANAX) 0.5 MG Tab      3. Situational mixed anxiety and depressive disorder  sertraline (ZOLOFT) 25 MG tablet      4. Systemic lupus erythematosus (SLE) with multisystem involvement (HCC)              Followup: Return in about 6 months (around 6/14/2024), or if symptoms worsen or fail to improve.

## 2024-01-02 ENCOUNTER — PATIENT OUTREACH (OUTPATIENT)
Dept: HEALTH INFORMATION MANAGEMENT | Facility: OTHER | Age: 74
End: 2024-01-02
Payer: MEDICARE

## 2024-01-02 DIAGNOSIS — I10 ESSENTIAL HYPERTENSION: ICD-10-CM

## 2024-01-02 DIAGNOSIS — K75.4 CHRONIC AUTOIMMUNE HEPATITIS (HCC): ICD-10-CM

## 2024-01-02 DIAGNOSIS — M32.19 SYSTEMIC LUPUS ERYTHEMATOSUS (SLE) WITH MULTISYSTEM INVOLVEMENT (HCC): ICD-10-CM

## 2024-01-02 DIAGNOSIS — F33.0 MILD EPISODE OF RECURRENT MAJOR DEPRESSIVE DISORDER (HCC): ICD-10-CM

## 2024-01-02 DIAGNOSIS — K74.60 CIRRHOSIS, NON-ALCOHOLIC (HCC): ICD-10-CM

## 2024-01-02 DIAGNOSIS — R76.8 ANA POSITIVE: ICD-10-CM

## 2024-01-02 PROCEDURE — 99490 CHRNC CARE MGMT STAFF 1ST 20: CPT | Performed by: FAMILY MEDICINE

## 2024-01-03 NOTE — PROGRESS NOTES
Called Madelyn for monthly outreach follow up. Madelyn reports she has had a sore throat and bad cough for 2 weeks. She was evaluated by the paramedics and was told her lungs were fine. She reports that her lungs keep filling up. Offer to assist with making an appointment at the clinic. She states she was a the mall and couldn't;t look at her schedule. Asked to call back.

## 2024-01-05 NOTE — PROGRESS NOTES
Called Madelyn for follow up on Xanax prescription. Let Madelyn know her prescription will be available at [facility] later today.

## 2024-01-05 NOTE — PROGRESS NOTES
Assessment  Spoke with Madelyn for monthly outreach follow up. Reported to Madelyn that Dr. De Jesus does not have an appointment available until 1/15. Madelyn stated she would wait and she if she felt better. She denied any shortness of breath or fever. She feels like it is just taking a long time to recover. Encouraged Mdaelyn to report to urgent care is her symptoms do not improve or worsen. She verbalized understanding. She reported that she was unable to  Xanax prescription and asked if a new one could be sent. Will follow up with CSV for details. Madelyn denied any other needs. Encouraged to call with any questions or concerns.     Education  Discussed follow up at urgent care for cold symptoms     Plan of Care and Goals  Continue pain management    Barriers:  Age  Progression of disease process  Knowledge of healthcare   Family stressors     Progress:  Progressing    Next outreach:  1 month

## 2024-01-23 ENCOUNTER — HOSPITAL ENCOUNTER (OUTPATIENT)
Dept: LAB | Facility: MEDICAL CENTER | Age: 74
End: 2024-01-23
Attending: INTERNAL MEDICINE
Payer: MEDICARE

## 2024-01-23 LAB
ALBUMIN SERPL BCP-MCNC: 4.8 G/DL (ref 3.2–4.9)
ALBUMIN/GLOB SERPL: 1.4 G/DL
ALP SERPL-CCNC: 86 U/L (ref 30–99)
ALT SERPL-CCNC: 50 U/L (ref 2–50)
ANION GAP SERPL CALC-SCNC: 12 MMOL/L (ref 7–16)
APPEARANCE UR: CLEAR
AST SERPL-CCNC: 39 U/L (ref 12–45)
BACTERIA #/AREA URNS HPF: NEGATIVE /HPF
BASOPHILS # BLD AUTO: 1.1 % (ref 0–1.8)
BASOPHILS # BLD: 0.05 K/UL (ref 0–0.12)
BILIRUB SERPL-MCNC: 0.7 MG/DL (ref 0.1–1.5)
BILIRUB UR QL STRIP.AUTO: NEGATIVE
BUN SERPL-MCNC: 7 MG/DL (ref 8–22)
C3 SERPL-MCNC: 109 MG/DL (ref 87–200)
C4 SERPL-MCNC: 19.3 MG/DL (ref 19–52)
CALCIUM ALBUM COR SERPL-MCNC: 9.1 MG/DL (ref 8.5–10.5)
CALCIUM SERPL-MCNC: 9.7 MG/DL (ref 8.5–10.5)
CHLORIDE SERPL-SCNC: 101 MMOL/L (ref 96–112)
CO2 SERPL-SCNC: 23 MMOL/L (ref 20–33)
COLOR UR: YELLOW
CREAT SERPL-MCNC: 0.64 MG/DL (ref 0.5–1.4)
EOSINOPHIL # BLD AUTO: 0.17 K/UL (ref 0–0.51)
EOSINOPHIL NFR BLD: 3.8 % (ref 0–6.9)
EPI CELLS #/AREA URNS HPF: NEGATIVE /HPF
ERYTHROCYTE [DISTWIDTH] IN BLOOD BY AUTOMATED COUNT: 50.4 FL (ref 35.9–50)
GFR SERPLBLD CREATININE-BSD FMLA CKD-EPI: 94 ML/MIN/1.73 M 2
GLOBULIN SER CALC-MCNC: 3.5 G/DL (ref 1.9–3.5)
GLUCOSE SERPL-MCNC: 115 MG/DL (ref 65–99)
GLUCOSE UR STRIP.AUTO-MCNC: NEGATIVE MG/DL
HCT VFR BLD AUTO: 47.4 % (ref 37–47)
HGB BLD-MCNC: 16.6 G/DL (ref 12–16)
HYALINE CASTS #/AREA URNS LPF: NORMAL /LPF
IMM GRANULOCYTES # BLD AUTO: 0.01 K/UL (ref 0–0.11)
IMM GRANULOCYTES NFR BLD AUTO: 0.2 % (ref 0–0.9)
KETONES UR STRIP.AUTO-MCNC: NEGATIVE MG/DL
LEUKOCYTE ESTERASE UR QL STRIP.AUTO: NEGATIVE
LYMPHOCYTES # BLD AUTO: 1.45 K/UL (ref 1–4.8)
LYMPHOCYTES NFR BLD: 32.2 % (ref 22–41)
MCH RBC QN AUTO: 34.2 PG (ref 27–33)
MCHC RBC AUTO-ENTMCNC: 35 G/DL (ref 32.2–35.5)
MCV RBC AUTO: 97.7 FL (ref 81.4–97.8)
MICRO URNS: NORMAL
MONOCYTES # BLD AUTO: 0.36 K/UL (ref 0–0.85)
MONOCYTES NFR BLD AUTO: 8 % (ref 0–13.4)
NEUTROPHILS # BLD AUTO: 2.46 K/UL (ref 1.82–7.42)
NEUTROPHILS NFR BLD: 54.7 % (ref 44–72)
NITRITE UR QL STRIP.AUTO: NEGATIVE
NRBC # BLD AUTO: 0 K/UL
NRBC BLD-RTO: 0 /100 WBC (ref 0–0.2)
PH UR STRIP.AUTO: 7 [PH] (ref 5–8)
PLATELET # BLD AUTO: 257 K/UL (ref 164–446)
PMV BLD AUTO: 10.4 FL (ref 9–12.9)
POTASSIUM SERPL-SCNC: 3.8 MMOL/L (ref 3.6–5.5)
PROT SERPL-MCNC: 8.3 G/DL (ref 6–8.2)
PROT UR QL STRIP: NEGATIVE MG/DL
RBC # BLD AUTO: 4.85 M/UL (ref 4.2–5.4)
RBC # URNS HPF: NORMAL /HPF
RBC UR QL AUTO: NEGATIVE
SODIUM SERPL-SCNC: 136 MMOL/L (ref 135–145)
SP GR UR STRIP.AUTO: 1.01
UROBILINOGEN UR STRIP.AUTO-MCNC: 0.2 MG/DL
WBC # BLD AUTO: 4.5 K/UL (ref 4.8–10.8)
WBC #/AREA URNS HPF: NORMAL /HPF

## 2024-01-23 PROCEDURE — 84156 ASSAY OF PROTEIN URINE: CPT

## 2024-01-23 PROCEDURE — 81001 URINALYSIS AUTO W/SCOPE: CPT

## 2024-01-23 PROCEDURE — 85025 COMPLETE CBC W/AUTO DIFF WBC: CPT

## 2024-01-23 PROCEDURE — 36415 COLL VENOUS BLD VENIPUNCTURE: CPT

## 2024-01-23 PROCEDURE — 86256 FLUORESCENT ANTIBODY TITER: CPT

## 2024-01-23 PROCEDURE — 80053 COMPREHEN METABOLIC PANEL: CPT

## 2024-01-23 PROCEDURE — 86160 COMPLEMENT ANTIGEN: CPT | Mod: 91

## 2024-01-23 PROCEDURE — 86225 DNA ANTIBODY NATIVE: CPT

## 2024-01-24 LAB — PROT UR-MCNC: <4 MG/DL (ref 0–15)

## 2024-01-25 LAB — DSDNA AB TITR SER CLIF: NORMAL {TITER}

## 2024-01-27 LAB — DSDNA IGG TITR SER CLIF: ABNORMAL {TITER}

## 2024-02-06 ENCOUNTER — PATIENT OUTREACH (OUTPATIENT)
Dept: HEALTH INFORMATION MANAGEMENT | Facility: OTHER | Age: 74
End: 2024-02-06
Payer: MEDICARE

## 2024-02-06 DIAGNOSIS — F33.0 MILD EPISODE OF RECURRENT MAJOR DEPRESSIVE DISORDER (HCC): ICD-10-CM

## 2024-02-06 DIAGNOSIS — K75.4 CHRONIC AUTOIMMUNE HEPATITIS (HCC): ICD-10-CM

## 2024-02-06 DIAGNOSIS — I10 ESSENTIAL HYPERTENSION: ICD-10-CM

## 2024-02-06 DIAGNOSIS — M32.19 SYSTEMIC LUPUS ERYTHEMATOSUS (SLE) WITH MULTISYSTEM INVOLVEMENT (HCC): ICD-10-CM

## 2024-02-06 DIAGNOSIS — K74.60 CIRRHOSIS, NON-ALCOHOLIC (HCC): ICD-10-CM

## 2024-02-06 DIAGNOSIS — R76.8 ANA POSITIVE: ICD-10-CM

## 2024-02-06 PROCEDURE — 99999 PR NO CHARGE: CPT | Performed by: FAMILY MEDICINE

## 2024-02-08 ENCOUNTER — PATIENT OUTREACH (OUTPATIENT)
Dept: HEALTH INFORMATION MANAGEMENT | Facility: OTHER | Age: 74
End: 2024-02-08
Payer: MEDICARE

## 2024-02-08 DIAGNOSIS — I10 ESSENTIAL HYPERTENSION: ICD-10-CM

## 2024-02-08 NOTE — PROGRESS NOTES
Community Health Worker Follow-Up    Reason for outreach: CHW called the pt as requested by the CCM nurse to assist with food resources.    CHW Interventions: CHW and the pt discussed the resources that were being sent.    Specific Resources Provided:  Housing/Shelter: n/a  Transportation: n/a  Food: Food bank resources for all Food pantries in the area, Prescription Food pantries, Local place to assist food delivery such as [employer] and others   Financial: n/a  Social Supports: n/a  Other: n/a    Plan: CHW will follow up in 2 weeks. Resources sent out in mail.

## 2024-02-08 NOTE — PROGRESS NOTES
Assessment  Madelyn returned call for monthly outreach follow-up.  Madelyn reports she is feeling pretty well.  She states that eating is still difficult and a struggle for her.  She reports that she tries to have at least 2 boost shakes a day.  Discussed having small frequent snacks instead of large meals.  She discussed financial concerns and difficulty buying food.  Placed a CHW referral for food pantry information.  Madelyn recently had a rheumatology follow-up and completed lab work.  Reviewed her lab work.  She reports she is scheduled for a GI consult and EGD.  Discussed preventative care schedule.  Discussed [facility] .  Encouraged her to call to review benefits if she has any questions. Madelyn denies any other needs at this time.  Encouraged to call with any questions or concerns    Education  Discussed last Rheumatology follow and recent lab work  Discussed poor appetite, small frequent snacks  Discussed financial difficulty/food insecurity     Plan of Care and Goals  Continue pain management  Continue to assess for the need of community resources  -CHW referral for food insecurity     Barriers:  Age  Progression of disease process  Knowledge of healthcare   Family stressors     Progress:  Progressing     Next outreach:  1 month     Chart reviewed for Quarterly Assessment, patient continues to qualify and benefit from PCM program.

## 2024-02-21 ENCOUNTER — PATIENT OUTREACH (OUTPATIENT)
Dept: HEALTH INFORMATION MANAGEMENT | Facility: OTHER | Age: 74
End: 2024-02-21
Payer: MEDICARE

## 2024-02-21 DIAGNOSIS — I10 ESSENTIAL HYPERTENSION: ICD-10-CM

## 2024-02-21 NOTE — PROGRESS NOTES
CHW tried calling the pt to follow up on resources sent. Pt did not answer and this CHW left a VM requesting a return call. 2/21  Community Health Worker Follow-Up    Reason for outreach: CHW called the pt to follow up on resources that were sent.    CHW Interventions: Pt stated she has the stomach flu right now, but, yes did get all resources in the mail and was able to utilize.    Specific Resources Provided:  Housing/Shelter: n/a  Transportation: n/a  Food: n/a  Financial: n/a  Social Supports: n/a  Other: n/a    Plan: CHW will no longer follow at this time.

## 2024-03-05 ENCOUNTER — PATIENT OUTREACH (OUTPATIENT)
Dept: HEALTH INFORMATION MANAGEMENT | Facility: OTHER | Age: 74
End: 2024-03-05
Payer: MEDICARE

## 2024-03-05 DIAGNOSIS — K75.4 CHRONIC AUTOIMMUNE HEPATITIS (HCC): ICD-10-CM

## 2024-03-05 DIAGNOSIS — K74.60 CIRRHOSIS, NON-ALCOHOLIC (HCC): ICD-10-CM

## 2024-03-05 DIAGNOSIS — M32.19 SYSTEMIC LUPUS ERYTHEMATOSUS (SLE) WITH MULTISYSTEM INVOLVEMENT (HCC): ICD-10-CM

## 2024-03-05 DIAGNOSIS — F33.0 MILD EPISODE OF RECURRENT MAJOR DEPRESSIVE DISORDER (HCC): ICD-10-CM

## 2024-03-05 DIAGNOSIS — R76.8 ANA POSITIVE: ICD-10-CM

## 2024-03-05 DIAGNOSIS — I10 ESSENTIAL HYPERTENSION: ICD-10-CM

## 2024-03-07 NOTE — PROGRESS NOTES
Assessment  Spoke with Madelyn for monthly outreach follow up. Madelyn states she her an grandsons are having stomach issues. She feels they have a virus. Discussed the importance of fluid intake to prevent dehydration. Madelyn states she is taking her grandson to the Doctor tomorrow. She reports she is drinking well and has two cans of Boast per day. She reports she no longer has medicaid benefits. Discussed the food bank resources sent out by CHW. Madelyn also has information on meals on wheels and will call.  She denies any other needs a this time. Encouraged to call with any questions or concerns.     Education  Discussed food back resources previously sent by CHW  Discussed Meals on wheels     Plan of Care and Goals  Continue pain management  Continue to assess for the need of community resources     Barriers:  Age  Progression of disease process-SLE  Knowledge of healthcare  Habits     Progress:  Progressing     Next outreach:   Monthly

## 2024-04-04 ENCOUNTER — PATIENT OUTREACH (OUTPATIENT)
Dept: HEALTH INFORMATION MANAGEMENT | Facility: OTHER | Age: 74
End: 2024-04-04
Payer: MEDICARE

## 2024-04-04 DIAGNOSIS — K74.60 CIRRHOSIS, NON-ALCOHOLIC (HCC): ICD-10-CM

## 2024-04-04 DIAGNOSIS — R76.8 ANA POSITIVE: ICD-10-CM

## 2024-04-04 DIAGNOSIS — I10 ESSENTIAL HYPERTENSION: ICD-10-CM

## 2024-04-04 DIAGNOSIS — M32.19 SYSTEMIC LUPUS ERYTHEMATOSUS (SLE) WITH MULTISYSTEM INVOLVEMENT (HCC): ICD-10-CM

## 2024-04-04 DIAGNOSIS — F33.0 MILD EPISODE OF RECURRENT MAJOR DEPRESSIVE DISORDER (HCC): ICD-10-CM

## 2024-04-04 DIAGNOSIS — K75.4 CHRONIC AUTOIMMUNE HEPATITIS (HCC): ICD-10-CM

## 2024-04-04 NOTE — PROGRESS NOTES
"Assessment  Spoke with Madelyn for monthly outreach reach follow up.  He reports \" I am not good at all.\"  She states that she is really weak and shaky. She states that sometimes she feels like her \"heart is pounding out of her chest\" and at times she is dizzy.  She is unsure if the symptoms correlate with one another.  Encouraged Madelyn to call 911 for symptoms of her heart pounding out her chest. She declined. Encouraged Madelyn to make an appointment with her PCP. She declined this as well. She reports she is scheduled to she her rheumatologist this month. Reinforced with madelyn that these symptoms are new and she should seek medical care/evaluation. She states she will call 911 for any increase in her symptoms. Madelyn also discussed financial difficulties. Encouraged her to follow up with the local food barker and reach out to meals on wheels.     Education  Discussed calling 911 for her current symptoms of \"heart pounding out of her chest\"   Discussed follow up with PCP for current symptoms     Plan of Care and Goals  Continue pain management   Reviewed food bank and meals on wheels     Barriers:  Age  Progression of disease process  Knowledge of healthcare  Habits     Progress:  Progressing     Next outreach:  1 month      Chart reviewed for Annual Case Review, patient continues to qualify and would benefit from PCM program.   "

## 2024-04-14 DIAGNOSIS — I10 ESSENTIAL HYPERTENSION: ICD-10-CM

## 2024-04-15 RX ORDER — AMLODIPINE BESYLATE 5 MG/1
5 TABLET ORAL DAILY
Qty: 90 TABLET | Refills: 3 | Status: SHIPPED | OUTPATIENT
Start: 2024-04-15

## 2024-04-19 ENCOUNTER — TELEPHONE (OUTPATIENT)
Dept: HEALTH INFORMATION MANAGEMENT | Facility: OTHER | Age: 74
End: 2024-04-19

## 2024-05-09 ENCOUNTER — TELEPHONE (OUTPATIENT)
Dept: HEALTH INFORMATION MANAGEMENT | Facility: OTHER | Age: 74
End: 2024-05-09
Payer: MEDICARE

## 2024-05-09 NOTE — TELEPHONE ENCOUNTER
"This RN received a phone call from patient stating that she is having visual changes x 2 weeks. She reports seeing vertical colored lines which mainly affects her right eye. Patient denies headaches, or dizziness. Patient reports that she has seen the eye doctor and stated that they couldn't find anything \"wrong\". This RN scheduled appointment with PCP for evaluation and instructed patient to go to ER or UC if symptoms worsen. Patient verbalizes understanding.   "

## 2024-05-13 ENCOUNTER — PATIENT OUTREACH (OUTPATIENT)
Dept: HEALTH INFORMATION MANAGEMENT | Facility: OTHER | Age: 74
End: 2024-05-13
Payer: MEDICARE

## 2024-05-13 DIAGNOSIS — I10 ESSENTIAL HYPERTENSION: ICD-10-CM

## 2024-05-13 DIAGNOSIS — M32.19 SYSTEMIC LUPUS ERYTHEMATOSUS (SLE) WITH MULTISYSTEM INVOLVEMENT (HCC): ICD-10-CM

## 2024-05-13 DIAGNOSIS — K75.4 CHRONIC AUTOIMMUNE HEPATITIS (HCC): ICD-10-CM

## 2024-05-13 DIAGNOSIS — R76.8 ANA POSITIVE: ICD-10-CM

## 2024-05-13 DIAGNOSIS — K74.60 CIRRHOSIS, NON-ALCOHOLIC (HCC): ICD-10-CM

## 2024-05-13 DIAGNOSIS — F33.0 MILD EPISODE OF RECURRENT MAJOR DEPRESSIVE DISORDER (HCC): ICD-10-CM

## 2024-05-14 NOTE — PROGRESS NOTES
Assessment  Spoke with Madelyn for monthly outreach follow up.  Madelyn reports she is having vision changes.  She states that she was seen by her eye doctor, who feels she may be having silent migraines.  Madelyn has a PCP appointment scheduled for the 23rd.  Offered to attempt to make this appointment sooner.  Madelyn declined.  She reports he is lost some weight, about 4 pounds.  Encouraged Madelyn to have frequent small meals or snacks.  Reviewed high calorie, high nutrition snacks.  Madelyn denied any other needs at this time.  Encouraged to call with any questions or concerns.     Education  Discussed follow up with PCP  Discussed follow up with Rheumatology     Plan of Care and Goals  Continue pain management  Continue to assess for the need of community resources     Barriers:  Age  Progression of disease process-  Knowledge of healthcare  Habits     Progress:  Progressing     Next outreach:  1 month     Chart reviewed for Quarterly Assessment, patient continues to qualify and would benefit from PCM program.

## 2024-05-18 DIAGNOSIS — E78.2 MIXED HYPERLIPIDEMIA: ICD-10-CM

## 2024-05-20 RX ORDER — ATORVASTATIN CALCIUM 20 MG/1
TABLET, FILM COATED ORAL
Qty: 100 TABLET | Refills: 3 | Status: SHIPPED | OUTPATIENT
Start: 2024-05-20

## 2024-05-20 NOTE — TELEPHONE ENCOUNTER
Received request via: Pharmacy    Was the patient seen in the last year in this department? Yes    Does the patient have an active prescription (recently filled or refills available) for medication(s) requested? No    Pharmacy Name:  [facility]      Does the patient have senior living Plus and need 100 day supply (blood pressure, diabetes and cholesterol meds only)? Yes, quantity updated to 100 days

## 2024-05-23 ENCOUNTER — OFFICE VISIT (OUTPATIENT)
Dept: MEDICAL GROUP | Facility: MEDICAL CENTER | Age: 74
End: 2024-05-23
Payer: MEDICARE

## 2024-05-23 VITALS
WEIGHT: 120 LBS | HEART RATE: 71 BPM | TEMPERATURE: 98 F | HEIGHT: 62 IN | DIASTOLIC BLOOD PRESSURE: 74 MMHG | RESPIRATION RATE: 18 BRPM | BODY MASS INDEX: 22.08 KG/M2 | OXYGEN SATURATION: 96 % | SYSTOLIC BLOOD PRESSURE: 122 MMHG

## 2024-05-23 DIAGNOSIS — I70.0 AORTIC ATHEROSCLEROSIS (HCC): ICD-10-CM

## 2024-05-23 DIAGNOSIS — I10 ESSENTIAL HYPERTENSION: ICD-10-CM

## 2024-05-23 DIAGNOSIS — K75.4 CHRONIC AUTOIMMUNE HEPATITIS (HCC): ICD-10-CM

## 2024-05-23 DIAGNOSIS — M35.9 AUTOIMMUNE DISEASE (HCC): ICD-10-CM

## 2024-05-23 DIAGNOSIS — M32.19 SYSTEMIC LUPUS ERYTHEMATOSUS (SLE) WITH MULTISYSTEM INVOLVEMENT (HCC): ICD-10-CM

## 2024-05-23 DIAGNOSIS — R10.10 PAIN OF UPPER ABDOMEN: ICD-10-CM

## 2024-05-23 DIAGNOSIS — M79.10 MUSCLE TENDERNESS: ICD-10-CM

## 2024-05-23 DIAGNOSIS — G43.109 OCULAR MIGRAINE: ICD-10-CM

## 2024-05-23 DIAGNOSIS — K22.70 BARRETT'S ESOPHAGUS WITHOUT DYSPLASIA: ICD-10-CM

## 2024-05-23 DIAGNOSIS — F41.8 SITUATIONAL ANXIETY: ICD-10-CM

## 2024-05-23 PROCEDURE — 3074F SYST BP LT 130 MM HG: CPT | Performed by: FAMILY MEDICINE

## 2024-05-23 PROCEDURE — 99214 OFFICE O/P EST MOD 30 MIN: CPT | Performed by: FAMILY MEDICINE

## 2024-05-23 PROCEDURE — 3078F DIAST BP <80 MM HG: CPT | Performed by: FAMILY MEDICINE

## 2024-05-23 RX ORDER — FAMOTIDINE 20 MG/1
20 TABLET, FILM COATED ORAL 2 TIMES DAILY
Qty: 180 TABLET | Refills: 2 | Status: SHIPPED | OUTPATIENT
Start: 2024-05-23

## 2024-05-23 RX ORDER — ALPRAZOLAM 0.5 MG/1
0.5 TABLET ORAL 3 TIMES DAILY PRN
Qty: 90 TABLET | Refills: 2 | Status: SHIPPED | OUTPATIENT
Start: 2024-05-23 | End: 2024-08-21

## 2024-05-23 ASSESSMENT — FIBROSIS 4 INDEX: FIB4 SCORE: 1.52

## 2024-05-23 ASSESSMENT — PATIENT HEALTH QUESTIONNAIRE - PHQ9: CLINICAL INTERPRETATION OF PHQ2 SCORE: 0

## 2024-05-23 NOTE — PROGRESS NOTES
Chief Complaint   Patient presents with    Follow-Up    Migraine    Hypertension       Subjective:     HPI:   Karen Dimas presents today with the followin. Ocular migraine  Patient saw her optometrist due to flashes of light in the periphery of her eyes and sometimes even horizontal lines.  They did a very thorough evaluation at [facility] including multiple retinal images.  They told her specifically that her retina looks fine and they feel these are ocular migraine.  Since she does have history of common migraine which she gets episodically this makes sense.  Lately she has been having very little sleep and greatly increased stress, particularly financial stress.  This may be triggering this.  She has been on multiple migraine medications in the past which were not effective or gave her significant side effects.  She states the ocular migraine symptoms are not painful.  She will just deal with this by trying to rest more.  She will let me know if she wishes to see neurology.    2. Autoimmune disease (HCC)/Systemic lupus erythematosus (SLE) with multisystem involvement (HCC)/Muscle tenderness  Patient has longstanding systemic lupus with multi system involvement including autoimmune hepatitis.  She has persistent muscle tenderness despite treatment with hydroxychloroquine and Imuran.  She continues to follow carefully with rheumatology.    3. Chronic autoimmune hepatitis (HCC)  Patient has chronic autoimmune hepatitis currently treated by rheumatology.  Most recent chemistry panel in January showed normal transaminases.  Patient avoids all liver toxins.    4. Essential hypertension  HTN - Chronic condition stable. Currently taking all meds as directed.   She is not taking baby aspirin daily.   She  is occasionally  monitoring BP at home.  Blood pressure here today very good.  Tolerates her losartan and amlodipine well.  Denies symptoms low BP: light-headed, tunnel-vision, unusual fatigue.    Denies symptoms high BP:pounding headache, visual changes, palpitations, flushed face.   Denies medicine side effects: unusual fatigue, slow heartbeat, foot/leg swelling, cough.    5. Aortic atherosclerosis (HCC)  Patient has incidentally noted aortic atherosclerosis without aneurysm.  Patient still smokes occasionally.  Finds it difficult to quit with severe stress.    6. Gómez's esophagus without dysplasia/Pain of upper abdomen  Patient has Gómez's esophagitis.  She has been followed by GI and biopsied, no dysplasia found.  She is having some upper abdominal pain.  Her regimen is supposed to be famotidine twice daily and pantoprazole.  She will double check at home that she is doing that.  Famotidine renewed.  Patient has trouble getting enough to eat, medical food bank form signed.    7. Situational anxiety  Patient has chronic severe situational anxiety with multiple stressors.  She is raising her great grandchildren.  Daughter with significant legal troubles.  Significant financial stress.  She has difficulty getting enough to eat.  Medical food bank form signed.        Patient Active Problem List    Diagnosis Date Noted    Ocular migraine 05/23/2024    Systemic lupus erythematosus (SLE) with multisystem involvement (HCC) 12/14/2023    Situational mixed anxiety and depressive disorder 12/14/2023    Arthritis of foot, right 06/14/2023    Earlobe lesion, left 06/14/2023    Posterior neck pain 12/16/2022    Itching 12/16/2022    Cirrhosis, non-alcoholic (HCC) 07/29/2022    Left ventricular hypokinesis 07/29/2022    Gómez's esophagus without dysplasia 07/29/2022    Cutaneous horn 07/29/2022    Chronic autoimmune hepatitis (HCC) 05/16/2022    Ds DNA antibody positive 05/16/2022    Elevated sedimentation rate 04/20/2022    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    Autoimmune disease (HCC) 04/20/2022    Epigastric abdominal pain 04/20/2022    Aortic atherosclerosis (HCC) 03/01/2022    Myofascial pain on left side 11/30/2021     Muscle tenderness 11/30/2021    History of 2019 novel coronavirus disease (COVID-19) 11/11/2021    Mild episode of recurrent major depressive disorder (HCC) 08/24/2021    Bilateral radicular pain 01/20/2021    Muscle spasm 01/20/2021    Mixed hyperlipidemia 01/20/2021    Vitamin D deficiency 01/20/2021    Intervertebral cervical disc disorder with myelopathy, cervical region 11/29/2017    Transaminitis 12/19/2016    Hyperglobulinemia 12/19/2016    Osteoporosis 12/18/2016    Weakness 07/31/2015    Stress-induced cardiomyopathy 09/30/2014    Situational anxiety 09/29/2014    Microscopic colitis 09/28/2014    Essential hypertension 09/26/2014    Insomnia 09/26/2014    Gastritis 09/25/2014       Current medicines (including changes today)  Current Outpatient Medications   Medication Sig Dispense Refill    famotidine (PEPCID) 20 MG Tab Take 1 Tablet by mouth 2 times a day. 180 Tablet 2    ALPRAZolam (XANAX) 0.5 MG Tab Take 1 Tablet by mouth 3 times a day as needed for Sleep or Anxiety for up to 90 days. 90 tablets is a 30-day quantity. 90 Tablet 2    atorvastatin (LIPITOR) 20 MG Tab TAKE 1 TABLET BY MOUTH EVERYDAY AT BEDTIME 100 Tablet 3    amLODIPine (NORVASC) 5 MG Tab TAKE 1 TABLET BY MOUTH EVERY DAY 90 Tablet 3    losartan (COZAAR) 100 MG Tab Take 1 Tablet by mouth every day. 100 Tablet 3    sertraline (ZOLOFT) 25 MG tablet Take 1 Tablet by mouth at bedtime. 90 Tablet 3    hydroxychloroquine (PLAQUENIL) 200 MG Tab       pantoprazole (PROTONIX) 40 MG Tablet Delayed Response Take 1 Tablet by mouth every day. 90 Tablet 3    Nutritional Supplements (ENSURE HIGH PROTEIN) Liquid Take 1 Can by mouth 2 times a day. 81265 mL 11    azaTHIOprine (IMURAN) 50 MG Tab Take 50 mg by mouth 2 times a day.      Vitamin E 268 MG (400 UNIT) Cap Take  by mouth every day.      Ascorbic Acid (VITAMIN C) 1000 MG Tab Take 2,000 mg by mouth every day.      Multiple Vitamin Tab Take  by mouth every day.      aluminum chloride (DRYSOL) 20 %  "external solution Apply to axillary regions nightly (Patient taking differently: at bedtime as needed. Apply to axillary regions nightly) 60 mL 4    Cholecalciferol (VITAMIN D3) 2000 UNIT Cap Take  by mouth every day. Patient takes 1,000 units a day       No current facility-administered medications for this visit.       Allergies   Allergen Reactions    Codeine Vomiting     [id number]    Cefdinir      Rash    Oxycodone Vomiting    Tramadol Vomiting     itching       ROS: As per HPI       Objective:     /74   Pulse 71   Temp 36.7 °C (98 °F)   Resp 18   Ht 1.575 m (5' 2\")   Wt 54.4 kg (120 lb)   SpO2 96%  Body mass index is 21.95 kg/m².    Physical Exam:  Constitutional: Well-developed and well-nourished. Not diaphoretic. No distress. Lucid and fluent.  Skin: Skin is warm and dry. No rash noted.  Head: Atraumatic without lesions.  Eyes: Conjunctivae and extraocular motions are normal. Pupils are equal, round, and reactive to light. No scleral icterus.   Ears:  External ears unremarkable.   Neck: Supple, trachea midline. No thyromegaly present. No cervical or supraclavicular lymphadenopathy. No JVD or carotid bruits appreciated  Cardiovascular: Regular rate and rhythm.  Normal S1, S2 without murmur appreciated.  Chest: Effort normal. Clear to auscultation throughout. No adventitious sounds.   Abdomen: Soft, non tender, and without distention. Active bowel sounds in all four quadrants. No rebound, guarding, masses or hepatosplenomegaly.  Extremities: No cyanosis, clubbing, erythema, nor edema.   Neurological: Alert and oriented x 3. No tremor appreciated.  Psychiatric:  Behavior, mood, and affect are appropriate.    Lab results from January reviewed.  No transaminase elevation.  No anemia.  She will be seeing rheumatology in a few weeks and will be completing further lab work.     Assessment and Plan:     73 y.o. female with the following issues:    1. Ocular migraine        2. Autoimmune disease (HCC)      "   3. Systemic lupus erythematosus (SLE) with multisystem involvement (HCC)        4. Muscle tenderness        5. Chronic autoimmune hepatitis (HCC)        6. Essential hypertension        7. Aortic atherosclerosis (HCC)        8. Gómez's esophagus without dysplasia  famotidine (PEPCID) 20 MG Tab      9. Pain of upper abdomen  famotidine (PEPCID) 20 MG Tab      10. Situational anxiety  ALPRAZolam (XANAX) 0.5 MG Tab            Followup: Return in about 6 months (around 11/23/2024), or if symptoms worsen or fail to improve.

## 2024-06-11 ENCOUNTER — PATIENT OUTREACH (OUTPATIENT)
Dept: HEALTH INFORMATION MANAGEMENT | Facility: OTHER | Age: 74
End: 2024-06-11
Payer: MEDICARE

## 2024-06-11 DIAGNOSIS — I10 ESSENTIAL HYPERTENSION: ICD-10-CM

## 2024-06-11 DIAGNOSIS — R76.8 ANA POSITIVE: ICD-10-CM

## 2024-06-11 DIAGNOSIS — M32.19 SYSTEMIC LUPUS ERYTHEMATOSUS (SLE) WITH MULTISYSTEM INVOLVEMENT (HCC): ICD-10-CM

## 2024-06-11 DIAGNOSIS — F33.0 MILD EPISODE OF RECURRENT MAJOR DEPRESSIVE DISORDER (HCC): ICD-10-CM

## 2024-06-11 DIAGNOSIS — K75.4 CHRONIC AUTOIMMUNE HEPATITIS (HCC): ICD-10-CM

## 2024-06-11 DIAGNOSIS — K74.60 CIRRHOSIS, NON-ALCOHOLIC (HCC): ICD-10-CM

## 2024-06-11 NOTE — PROGRESS NOTES
Assessment  Spoke with Madelyn for monthly outreach follow up. Madelyn reports she is feeling better. She states she had a ruff few days with pain to her ribs, ankles, and knees. Reviewed next Rheumatology. Madelyn report she has pending lab orders, paper orders from Dr. Rothman. She states she has not had an ocular migraine in about a month. Discussed her appetite. Continued to encourage frequent snacks vs 3 larger meals. Discussed family stressor and the care of her great-grandchildren. Madelyn denied any needs at this time. Encouraged to call with any questions or concerns.     Education  Discussed next Rheumatology appointment  Dicussed lab order-has paper orders from Dr. Rothman    Plan of Care and Goals  Continue pain management  Continue to assess for the need of community resources     Barriers:  Age  Progression of disease process  Knowledge of healthcare  Habits     Progress:  Progressing     Next outreach:  1 month

## 2024-07-06 ENCOUNTER — HOSPITAL ENCOUNTER (OUTPATIENT)
Dept: LAB | Facility: MEDICAL CENTER | Age: 74
End: 2024-07-06
Attending: INTERNAL MEDICINE
Payer: MEDICARE

## 2024-07-06 LAB
ALBUMIN SERPL BCP-MCNC: 4.7 G/DL (ref 3.2–4.9)
ALBUMIN/GLOB SERPL: 1.6 G/DL
ALP SERPL-CCNC: 80 U/L (ref 30–99)
ALT SERPL-CCNC: 15 U/L (ref 2–50)
ANION GAP SERPL CALC-SCNC: 15 MMOL/L (ref 7–16)
APPEARANCE UR: CLEAR
AST SERPL-CCNC: 28 U/L (ref 12–45)
BASOPHILS # BLD AUTO: 1 % (ref 0–1.8)
BASOPHILS # BLD: 0.05 K/UL (ref 0–0.12)
BILIRUB SERPL-MCNC: 0.9 MG/DL (ref 0.1–1.5)
BILIRUB UR QL STRIP.AUTO: NEGATIVE
BUN SERPL-MCNC: 10 MG/DL (ref 8–22)
C3 SERPL-MCNC: 100.8 MG/DL (ref 87–200)
C4 SERPL-MCNC: 19.4 MG/DL (ref 19–52)
CALCIUM ALBUM COR SERPL-MCNC: 9.9 MG/DL (ref 8.5–10.5)
CALCIUM SERPL-MCNC: 10.5 MG/DL (ref 8.5–10.5)
CHLORIDE SERPL-SCNC: 102 MMOL/L (ref 96–112)
CK SERPL-CCNC: 91 U/L (ref 0–154)
CO2 SERPL-SCNC: 20 MMOL/L (ref 20–33)
COLOR UR: YELLOW
CREAT SERPL-MCNC: 0.69 MG/DL (ref 0.5–1.4)
CREAT UR-MCNC: 45.07 MG/DL
EOSINOPHIL # BLD AUTO: 0.13 K/UL (ref 0–0.51)
EOSINOPHIL NFR BLD: 2.7 % (ref 0–6.9)
ERYTHROCYTE [DISTWIDTH] IN BLOOD BY AUTOMATED COUNT: 51.3 FL (ref 35.9–50)
ERYTHROCYTE [SEDIMENTATION RATE] IN BLOOD BY WESTERGREN METHOD: 11 MM/HOUR (ref 0–25)
GFR SERPLBLD CREATININE-BSD FMLA CKD-EPI: 92 ML/MIN/1.73 M 2
GLOBULIN SER CALC-MCNC: 2.9 G/DL (ref 1.9–3.5)
GLUCOSE SERPL-MCNC: 108 MG/DL (ref 65–99)
GLUCOSE UR STRIP.AUTO-MCNC: NEGATIVE MG/DL
HCT VFR BLD AUTO: 44.7 % (ref 37–47)
HGB BLD-MCNC: 15.9 G/DL (ref 12–16)
IMM GRANULOCYTES # BLD AUTO: 0.03 K/UL (ref 0–0.11)
IMM GRANULOCYTES NFR BLD AUTO: 0.6 % (ref 0–0.9)
KETONES UR STRIP.AUTO-MCNC: NEGATIVE MG/DL
LEUKOCYTE ESTERASE UR QL STRIP.AUTO: NEGATIVE
LYMPHOCYTES # BLD AUTO: 1.06 K/UL (ref 1–4.8)
LYMPHOCYTES NFR BLD: 21.9 % (ref 22–41)
MCH RBC QN AUTO: 35.8 PG (ref 27–33)
MCHC RBC AUTO-ENTMCNC: 35.6 G/DL (ref 32.2–35.5)
MCV RBC AUTO: 100.7 FL (ref 81.4–97.8)
MICRO URNS: NORMAL
MONOCYTES # BLD AUTO: 0.45 K/UL (ref 0–0.85)
MONOCYTES NFR BLD AUTO: 9.3 % (ref 0–13.4)
NEUTROPHILS # BLD AUTO: 3.13 K/UL (ref 1.82–7.42)
NEUTROPHILS NFR BLD: 64.5 % (ref 44–72)
NITRITE UR QL STRIP.AUTO: NEGATIVE
NRBC # BLD AUTO: 0 K/UL
NRBC BLD-RTO: 0 /100 WBC (ref 0–0.2)
PH UR STRIP.AUTO: 7 [PH] (ref 5–8)
PLATELET # BLD AUTO: 286 K/UL (ref 164–446)
PMV BLD AUTO: 10.1 FL (ref 9–12.9)
POTASSIUM SERPL-SCNC: 4.5 MMOL/L (ref 3.6–5.5)
PROT SERPL-MCNC: 7.6 G/DL (ref 6–8.2)
PROT UR QL STRIP: NEGATIVE MG/DL
PROT UR-MCNC: 6 MG/DL (ref 0–15)
PROT/CREAT UR: 133 MG/G (ref 10–107)
RBC # BLD AUTO: 4.44 M/UL (ref 4.2–5.4)
RBC UR QL AUTO: NEGATIVE
SODIUM SERPL-SCNC: 137 MMOL/L (ref 135–145)
SP GR UR STRIP.AUTO: 1.01
UROBILINOGEN UR STRIP.AUTO-MCNC: 1 MG/DL
WBC # BLD AUTO: 4.9 K/UL (ref 4.8–10.8)

## 2024-07-06 PROCEDURE — 85025 COMPLETE CBC W/AUTO DIFF WBC: CPT

## 2024-07-06 PROCEDURE — 86160 COMPLEMENT ANTIGEN: CPT

## 2024-07-06 PROCEDURE — 84156 ASSAY OF PROTEIN URINE: CPT

## 2024-07-06 PROCEDURE — 81003 URINALYSIS AUTO W/O SCOPE: CPT

## 2024-07-06 PROCEDURE — 82085 ASSAY OF ALDOLASE: CPT

## 2024-07-06 PROCEDURE — 85652 RBC SED RATE AUTOMATED: CPT

## 2024-07-06 PROCEDURE — 82550 ASSAY OF CK (CPK): CPT

## 2024-07-06 PROCEDURE — 86256 FLUORESCENT ANTIBODY TITER: CPT

## 2024-07-06 PROCEDURE — 36415 COLL VENOUS BLD VENIPUNCTURE: CPT

## 2024-07-06 PROCEDURE — 80053 COMPREHEN METABOLIC PANEL: CPT

## 2024-07-06 PROCEDURE — 86225 DNA ANTIBODY NATIVE: CPT

## 2024-07-06 PROCEDURE — 82570 ASSAY OF URINE CREATININE: CPT

## 2024-07-08 LAB
ALDOLASE SERPL-CCNC: 2.4 U/L (ref 1.2–7.6)
DSDNA AB TITR SER CLIF: NORMAL {TITER}

## 2024-07-11 ENCOUNTER — PATIENT OUTREACH (OUTPATIENT)
Dept: HEALTH INFORMATION MANAGEMENT | Facility: OTHER | Age: 74
End: 2024-07-11
Payer: MEDICARE

## 2024-07-11 DIAGNOSIS — K75.4 CHRONIC AUTOIMMUNE HEPATITIS (HCC): ICD-10-CM

## 2024-07-11 DIAGNOSIS — R10.13 EPIGASTRIC ABDOMINAL PAIN: ICD-10-CM

## 2024-07-11 DIAGNOSIS — R76.8 ANA POSITIVE: ICD-10-CM

## 2024-07-11 DIAGNOSIS — K29.30 CHRONIC SUPERFICIAL GASTRITIS WITHOUT BLEEDING: ICD-10-CM

## 2024-07-11 DIAGNOSIS — K22.70 BARRETT'S ESOPHAGUS WITHOUT DYSPLASIA: ICD-10-CM

## 2024-07-11 DIAGNOSIS — K74.60 CIRRHOSIS, NON-ALCOHOLIC (HCC): ICD-10-CM

## 2024-07-11 DIAGNOSIS — F33.0 MILD EPISODE OF RECURRENT MAJOR DEPRESSIVE DISORDER (HCC): ICD-10-CM

## 2024-07-11 DIAGNOSIS — I10 ESSENTIAL HYPERTENSION: ICD-10-CM

## 2024-07-11 DIAGNOSIS — M32.19 SYSTEMIC LUPUS ERYTHEMATOSUS (SLE) WITH MULTISYSTEM INVOLVEMENT (HCC): ICD-10-CM

## 2024-07-11 LAB — DSDNA IGG TITR SER CLIF: ABNORMAL {TITER}

## 2024-07-11 RX ORDER — PANTOPRAZOLE SODIUM 40 MG/1
TABLET, DELAYED RELEASE ORAL
Qty: 90 TABLET | Refills: 3 | Status: SHIPPED | OUTPATIENT
Start: 2024-07-11

## 2024-07-12 DIAGNOSIS — G44.52 NEW PERSISTENT DAILY HEADACHE: ICD-10-CM

## 2024-07-12 DIAGNOSIS — R11.0 NAUSEA: ICD-10-CM

## 2024-07-12 DIAGNOSIS — R42 DIZZINESS: ICD-10-CM

## 2024-07-12 DIAGNOSIS — R53.1 WEAKNESS: ICD-10-CM

## 2024-07-22 ENCOUNTER — OFFICE VISIT (OUTPATIENT)
Dept: MEDICAL GROUP | Facility: MEDICAL CENTER | Age: 74
End: 2024-07-22
Payer: MEDICARE

## 2024-07-22 ENCOUNTER — TELEPHONE (OUTPATIENT)
Dept: HEALTH INFORMATION MANAGEMENT | Facility: OTHER | Age: 74
End: 2024-07-22

## 2024-07-22 VITALS
DIASTOLIC BLOOD PRESSURE: 60 MMHG | HEIGHT: 62 IN | TEMPERATURE: 98 F | BODY MASS INDEX: 21.9 KG/M2 | RESPIRATION RATE: 18 BRPM | WEIGHT: 119 LBS | SYSTOLIC BLOOD PRESSURE: 102 MMHG | OXYGEN SATURATION: 96 % | HEART RATE: 85 BPM

## 2024-07-22 DIAGNOSIS — K75.4 CHRONIC AUTOIMMUNE HEPATITIS (HCC): ICD-10-CM

## 2024-07-22 DIAGNOSIS — Z23 NEED FOR VACCINATION AGAINST HEPATITIS A: ICD-10-CM

## 2024-07-22 DIAGNOSIS — M35.9 AUTOIMMUNE DISEASE (HCC): ICD-10-CM

## 2024-07-22 DIAGNOSIS — K74.60 CIRRHOSIS, NON-ALCOHOLIC (HCC): ICD-10-CM

## 2024-07-22 DIAGNOSIS — M32.19 SYSTEMIC LUPUS ERYTHEMATOSUS (SLE) WITH MULTISYSTEM INVOLVEMENT (HCC): ICD-10-CM

## 2024-07-22 DIAGNOSIS — R11.2 NAUSEA AND VOMITING IN ADULT: ICD-10-CM

## 2024-07-22 DIAGNOSIS — F41.8 SITUATIONAL ANXIETY: ICD-10-CM

## 2024-07-22 PROCEDURE — 90632 HEPA VACCINE ADULT IM: CPT | Performed by: FAMILY MEDICINE

## 2024-07-22 PROCEDURE — 99214 OFFICE O/P EST MOD 30 MIN: CPT | Mod: 25 | Performed by: FAMILY MEDICINE

## 2024-07-22 PROCEDURE — 90471 IMMUNIZATION ADMIN: CPT | Performed by: FAMILY MEDICINE

## 2024-07-22 PROCEDURE — 3078F DIAST BP <80 MM HG: CPT | Performed by: FAMILY MEDICINE

## 2024-07-22 PROCEDURE — 3074F SYST BP LT 130 MM HG: CPT | Performed by: FAMILY MEDICINE

## 2024-07-22 RX ORDER — PROMETHAZINE HYDROCHLORIDE 25 MG/1
25 TABLET ORAL EVERY 6 HOURS PRN
Qty: 60 TABLET | Refills: 4 | Status: SHIPPED | OUTPATIENT
Start: 2024-07-22

## 2024-07-22 RX ORDER — AZATHIOPRINE 50 MG/1
75 TABLET ORAL 2 TIMES DAILY
Qty: 270 TABLET | Refills: 3 | Status: SHIPPED | OUTPATIENT
Start: 2024-07-22

## 2024-07-22 RX ORDER — ALPRAZOLAM 0.5 MG/1
0.5 TABLET ORAL 3 TIMES DAILY PRN
Qty: 90 TABLET | Refills: 2 | Status: SHIPPED | OUTPATIENT
Start: 2024-07-22 | End: 2024-07-23

## 2024-07-22 RX ORDER — HYDROXYCHLOROQUINE SULFATE 200 MG/1
200 TABLET, FILM COATED ORAL DAILY
Qty: 90 TABLET | Refills: 3 | Status: SHIPPED | OUTPATIENT
Start: 2024-07-22

## 2024-07-22 ASSESSMENT — FIBROSIS 4 INDEX: FIB4 SCORE: 1.79

## 2024-07-23 RX ORDER — ALPRAZOLAM 0.5 MG/1
TABLET ORAL
Qty: 90 TABLET | Refills: 2 | Status: SHIPPED | OUTPATIENT
Start: 2024-07-23 | End: 2024-10-21

## 2024-08-21 ENCOUNTER — PATIENT OUTREACH (OUTPATIENT)
Dept: HEALTH INFORMATION MANAGEMENT | Facility: OTHER | Age: 74
End: 2024-08-21
Payer: MEDICARE

## 2024-08-21 DIAGNOSIS — R76.8 ANA POSITIVE: ICD-10-CM

## 2024-08-21 DIAGNOSIS — I10 ESSENTIAL HYPERTENSION: ICD-10-CM

## 2024-08-21 DIAGNOSIS — F33.0 MILD EPISODE OF RECURRENT MAJOR DEPRESSIVE DISORDER (HCC): ICD-10-CM

## 2024-08-21 DIAGNOSIS — K75.4 CHRONIC AUTOIMMUNE HEPATITIS (HCC): ICD-10-CM

## 2024-08-21 DIAGNOSIS — K74.60 CIRRHOSIS, NON-ALCOHOLIC (HCC): ICD-10-CM

## 2024-08-21 DIAGNOSIS — M32.19 SYSTEMIC LUPUS ERYTHEMATOSUS (SLE) WITH MULTISYSTEM INVOLVEMENT (HCC): ICD-10-CM

## 2024-08-26 NOTE — PROGRESS NOTES
Three attempts at monthly outreach follow up. No answer, left messages. Will follow up at next monthly outreach.     Chart reviewed for Quarterly Assessment, patient continues to qualify and would benefit from PCM program.

## 2024-09-10 DIAGNOSIS — F43.23 SITUATIONAL MIXED ANXIETY AND DEPRESSIVE DISORDER: ICD-10-CM

## 2024-09-10 RX ORDER — SERTRALINE HYDROCHLORIDE 25 MG/1
25 TABLET, FILM COATED ORAL
Qty: 90 TABLET | Refills: 3 | Status: SHIPPED | OUTPATIENT
Start: 2024-09-10

## 2024-09-27 ENCOUNTER — PATIENT OUTREACH (OUTPATIENT)
Dept: HEALTH INFORMATION MANAGEMENT | Facility: OTHER | Age: 74
End: 2024-09-27
Payer: MEDICARE

## 2024-09-27 DIAGNOSIS — K74.60 CIRRHOSIS, NON-ALCOHOLIC (HCC): ICD-10-CM

## 2024-09-27 DIAGNOSIS — R76.8 ANA POSITIVE: ICD-10-CM

## 2024-09-27 DIAGNOSIS — I10 ESSENTIAL HYPERTENSION: ICD-10-CM

## 2024-09-27 DIAGNOSIS — M32.19 SYSTEMIC LUPUS ERYTHEMATOSUS (SLE) WITH MULTISYSTEM INVOLVEMENT (HCC): ICD-10-CM

## 2024-09-27 DIAGNOSIS — K75.4 CHRONIC AUTOIMMUNE HEPATITIS (HCC): ICD-10-CM

## 2024-09-27 DIAGNOSIS — F33.0 MILD EPISODE OF RECURRENT MAJOR DEPRESSIVE DISORDER (HCC): ICD-10-CM

## 2024-09-27 NOTE — PROGRESS NOTES
"Assessment  Spoke with Madelyn for monthly outreach. Madelyn states that she is \"not doing good.\" She reports she is not sleeping well and is very fatigued. She declined to make a PCP appointment. Discussed sleep hygiene strategies. She reports her nutritional and fluid intake has been \"good.\" She reports her weight has been stable and denies any weight loss. She is under much financial strain and has started working for [employer]. Discussed food pantry information. Will mail out a list of local food pantries. Madelyn stated that she was getting another call and asked to end call. Encouraged to call with any questions or concerns.     Education and Self Management of Care  Discussed pain management  Discussed nutrition and fluid intake   Mail out food pantry information     Plan of Care and Goals  Continue pain management  Continue to assess for the need of community resources      Barriers:  Age  Progression of disease process  Knowledge of healthcare  Habits      Progress:  Progressing    Next outreach:  1 month    "

## 2024-10-30 ENCOUNTER — PATIENT OUTREACH (OUTPATIENT)
Dept: HEALTH INFORMATION MANAGEMENT | Facility: OTHER | Age: 74
End: 2024-10-30
Payer: MEDICARE

## 2024-10-30 DIAGNOSIS — I10 ESSENTIAL HYPERTENSION: ICD-10-CM

## 2024-10-30 DIAGNOSIS — R76.8 ANA POSITIVE: ICD-10-CM

## 2024-10-30 DIAGNOSIS — K74.60 CIRRHOSIS, NON-ALCOHOLIC (HCC): ICD-10-CM

## 2024-10-30 DIAGNOSIS — F33.0 MILD EPISODE OF RECURRENT MAJOR DEPRESSIVE DISORDER (HCC): ICD-10-CM

## 2024-10-30 DIAGNOSIS — K75.4 CHRONIC AUTOIMMUNE HEPATITIS (HCC): ICD-10-CM

## 2024-10-30 DIAGNOSIS — M32.19 SYSTEMIC LUPUS ERYTHEMATOSUS (SLE) WITH MULTISYSTEM INVOLVEMENT (HCC): ICD-10-CM

## 2024-10-31 ENCOUNTER — OFFICE VISIT (OUTPATIENT)
Dept: MEDICAL GROUP | Facility: MEDICAL CENTER | Age: 74
End: 2024-10-31
Payer: MEDICARE

## 2024-10-31 VITALS
TEMPERATURE: 97.8 F | OXYGEN SATURATION: 98 % | WEIGHT: 122 LBS | DIASTOLIC BLOOD PRESSURE: 80 MMHG | HEIGHT: 62 IN | RESPIRATION RATE: 16 BRPM | SYSTOLIC BLOOD PRESSURE: 132 MMHG | HEART RATE: 87 BPM | BODY MASS INDEX: 22.45 KG/M2

## 2024-10-31 DIAGNOSIS — F43.23 SITUATIONAL MIXED ANXIETY AND DEPRESSIVE DISORDER: ICD-10-CM

## 2024-10-31 DIAGNOSIS — K75.4 CHRONIC AUTOIMMUNE HEPATITIS (HCC): ICD-10-CM

## 2024-10-31 DIAGNOSIS — Z12.31 ENCOUNTER FOR SCREENING MAMMOGRAM FOR MALIGNANT NEOPLASM OF BREAST: ICD-10-CM

## 2024-10-31 DIAGNOSIS — F51.01 PRIMARY INSOMNIA: ICD-10-CM

## 2024-10-31 DIAGNOSIS — Z02.89 ENCOUNTER FOR COMPLETION OF FORM WITH PATIENT: ICD-10-CM

## 2024-10-31 DIAGNOSIS — F41.8 SITUATIONAL ANXIETY: ICD-10-CM

## 2024-10-31 PROBLEM — Z86.0100 HISTORY OF COLONIC POLYPS: Status: RESOLVED | Noted: 2020-10-19 | Resolved: 2024-10-31

## 2024-10-31 PROBLEM — Z86.0100 HISTORY OF COLONIC POLYPS: Status: RESOLVED | Noted: 2024-10-31 | Resolved: 2024-10-31

## 2024-10-31 RX ORDER — ALPRAZOLAM 0.5 MG
0.5 TABLET ORAL 3 TIMES DAILY PRN
Qty: 90 TABLET | Refills: 0 | Status: SHIPPED | OUTPATIENT
Start: 2024-12-30 | End: 2025-01-29

## 2024-10-31 RX ORDER — ALPRAZOLAM 0.5 MG
0.5 TABLET ORAL 3 TIMES DAILY PRN
Qty: 90 TABLET | Refills: 0 | Status: SHIPPED | OUTPATIENT
Start: 2024-10-31 | End: 2024-11-30

## 2024-10-31 RX ORDER — ALPRAZOLAM 0.5 MG
0.5 TABLET ORAL 3 TIMES DAILY PRN
Qty: 90 TABLET | Refills: 0 | Status: SHIPPED | OUTPATIENT
Start: 2024-11-30 | End: 2024-12-30

## 2024-10-31 ASSESSMENT — FIBROSIS 4 INDEX: FIB4 SCORE: 1.79

## 2024-11-06 ENCOUNTER — APPOINTMENT (OUTPATIENT)
Dept: RADIOLOGY | Facility: MEDICAL CENTER | Age: 74
End: 2024-11-06
Attending: FAMILY MEDICINE
Payer: MEDICARE

## 2024-11-12 ENCOUNTER — TELEPHONE (OUTPATIENT)
Dept: MEDICAL GROUP | Facility: MEDICAL CENTER | Age: 74
End: 2024-11-12
Payer: MEDICARE

## 2024-11-12 DIAGNOSIS — L01.00 IMPETIGO: ICD-10-CM

## 2024-11-12 NOTE — TELEPHONE ENCOUNTER
Caller Name: Karen Dimas      Call Back Number: [phone] (home)       How would the patient prefer to be contacted with a response: Phone call OK to leave a detailed message    2. What are the patient's symptoms (location & severity)? Infantigo on her nose     3. Is this a new symptom Yes    4. When did it start? Unknown

## 2024-11-13 RX ORDER — MUPIROCIN 20 MG/G
1 OINTMENT TOPICAL 2 TIMES DAILY
Qty: 22 G | Refills: 0 | Status: SHIPPED | OUTPATIENT
Start: 2024-11-13

## 2024-11-13 NOTE — TELEPHONE ENCOUNTER
Okay, I have sent mupirocin ointment to her [facility] pharmacy.  She should use this twice daily.

## 2024-11-19 ENCOUNTER — PATIENT OUTREACH (OUTPATIENT)
Dept: HEALTH INFORMATION MANAGEMENT | Facility: OTHER | Age: 74
End: 2024-11-19
Payer: MEDICARE

## 2024-11-19 DIAGNOSIS — M32.19 SYSTEMIC LUPUS ERYTHEMATOSUS (SLE) WITH MULTISYSTEM INVOLVEMENT (HCC): ICD-10-CM

## 2024-11-19 DIAGNOSIS — K74.60 CIRRHOSIS, NON-ALCOHOLIC (HCC): ICD-10-CM

## 2024-11-19 DIAGNOSIS — R76.8 ANA POSITIVE: ICD-10-CM

## 2024-11-19 DIAGNOSIS — F33.0 MILD EPISODE OF RECURRENT MAJOR DEPRESSIVE DISORDER (HCC): ICD-10-CM

## 2024-11-19 DIAGNOSIS — I10 ESSENTIAL HYPERTENSION: ICD-10-CM

## 2024-11-19 DIAGNOSIS — K75.4 CHRONIC AUTOIMMUNE HEPATITIS (HCC): ICD-10-CM

## 2024-11-20 NOTE — PROGRESS NOTES
"Assessment  Spoke with Madelyn for monthly outreach follow-up.  She reported that she is \"starting to get a little relief.\"  She feels some improvement in her pain management.  Wished her happy belated birthday.  She celebrated with her great grandsons.  Reviewed her PCP appointment from 10/31.  Her blood pressure was 132/80.  Reviewed her current medications.  Discussed her next PCP follow-up appointment.  Madelyn discussed her multiple family and financial stressors.  Discussed self-care and stress management.  Madelyn denied any other needs at this time.  Encouraged to call with any questions or concerns.     Education and Self-Management of Care  Discussed     Plan of Care and Goals  Update plan of care format  Added hypertension education  Added management of Systemic lupus erythematous/chronic autoimmune hepatitis  Continue to assess for the need of community resources      Barriers:  Age  Progression of disease process  Knowledge of healthcare  Habits   Multiple family and financial stressors     Progress:  Progressing    Next outreach:   December 2024    "

## 2024-11-26 NOTE — CARE PLAN
Problem: Adherence to prescribed medications  Goal: Improve medication adherence/short term   Outcome: Progressing  Intervention: Educate patient about the importance of blood pressure medications  Intervention: In conjunction with patient, determine why adherence is a challenge  Intervention: Develop a plan with patient to improve adherence     Problem: Patient Stated Problem: Health concerns related to systemic lupus erythematous/chronic autoimmune hepatitis  Goal: Patient Stated Goal: Manage Systemic lupus erythematous/chronic autoimmune hepatitis/long term   Outcome: Progressing  Intervention: First Patient Stated Intervention: Medication adherence   Intervention: Second Patient Stated Intervention: Follow up with Rheumatology and PCP  Intervention: Third Patient Stated Intervention: Monitor regularly scheduled lab orders

## 2024-12-30 ENCOUNTER — PATIENT OUTREACH (OUTPATIENT)
Dept: HEALTH INFORMATION MANAGEMENT | Facility: OTHER | Age: 74
End: 2024-12-30
Payer: MEDICARE

## 2024-12-30 DIAGNOSIS — F33.0 MILD EPISODE OF RECURRENT MAJOR DEPRESSIVE DISORDER (HCC): ICD-10-CM

## 2024-12-30 DIAGNOSIS — K74.60 CIRRHOSIS, NON-ALCOHOLIC (HCC): ICD-10-CM

## 2024-12-30 DIAGNOSIS — I10 ESSENTIAL HYPERTENSION: ICD-10-CM

## 2024-12-30 DIAGNOSIS — K75.4 CHRONIC AUTOIMMUNE HEPATITIS (HCC): ICD-10-CM

## 2024-12-30 DIAGNOSIS — R76.8 ANA POSITIVE: ICD-10-CM

## 2024-12-30 DIAGNOSIS — M32.19 SYSTEMIC LUPUS ERYTHEMATOSUS (SLE) WITH MULTISYSTEM INVOLVEMENT (HCC): ICD-10-CM

## 2024-12-30 PROCEDURE — 99999 PR NO CHARGE: CPT | Performed by: FAMILY MEDICINE

## 2025-01-03 DIAGNOSIS — K29.30 CHRONIC SUPERFICIAL GASTRITIS WITHOUT BLEEDING: ICD-10-CM

## 2025-01-03 DIAGNOSIS — E78.2 MIXED HYPERLIPIDEMIA: ICD-10-CM

## 2025-01-03 DIAGNOSIS — I10 ESSENTIAL HYPERTENSION: ICD-10-CM

## 2025-01-03 DIAGNOSIS — K74.60 CIRRHOSIS, NON-ALCOHOLIC (HCC): ICD-10-CM

## 2025-01-03 DIAGNOSIS — E55.9 VITAMIN D DEFICIENCY: ICD-10-CM

## 2025-01-03 NOTE — PROGRESS NOTES
Assessment  Spoke with Madelyn for monthly outreach follow up. Madelyn reports she is not doing well at the moment. She reports a flare of her lupus related joint pain. She states her ankle is painful to the touch and she is unable to walk. She is denies any redness or swelling, no fevers. She states she is using her walker to ambulate. Offered to assist in making a PCP appointment. She declined, stating this has happened before and she will see Dr. De Jesus at her regular scheduled appointment on 1/22. She asked if she need labs prior to appointment, will follow up with PCP. Madelyn is not interested in Behavioral Health referral and/or therapy but might be interested in an online support group. Discussed meeting at her next PCP appointment to provide resources. Madelyn ask to end call, she did not want to discuss her plan of care any further. She denied any other needs at this time. Encouraged to call with any questions or concerns.     Education and Self-Management of Care  Discussed symptoms of lupus, joint paint-declined new PCP appointment-will continue to f/u on 1/22  Discussed stress management-possible online support groups.     Plan of Care and Goals  Continue hypertension education  Continue management of systemic lupus erythematous/chronic autoimmune hepatitis  Continue to assess for the need of community resources      Barriers:  Age  Progression of disease process  Knowledge of healthcare  Habits   Multiple family and financial stressors     Next outreach:   January 2025

## 2025-01-19 DIAGNOSIS — I10 ESSENTIAL HYPERTENSION: ICD-10-CM

## 2025-01-20 ENCOUNTER — TELEPHONE (OUTPATIENT)
Dept: HEALTH INFORMATION MANAGEMENT | Facility: OTHER | Age: 75
End: 2025-01-20
Payer: MEDICARE

## 2025-01-20 RX ORDER — LOSARTAN POTASSIUM 100 MG/1
100 TABLET ORAL DAILY
Qty: 100 TABLET | Refills: 3 | Status: SHIPPED | OUTPATIENT
Start: 2025-01-20

## 2025-01-20 NOTE — TELEPHONE ENCOUNTER
Called Madelyn to notify her of pending lab orders placed by Dr. De Jesus. Madelyn verbalized understanding and states she will make a lab appointment. She denied any needs at this time. Encouraged to call with any questions or concerns.

## 2025-01-20 NOTE — TELEPHONE ENCOUNTER
Received request via: Pharmacy    Was the patient seen in the last year in this department? Yes    Does the patient have an active prescription (recently filled or refills available) for medication(s) requested? No    Pharmacy Name:   To be filled at: [facility]/pharmacy #[id number] - [place], [place] - [address]              Does the patient have long-term Plus and need 100-day supply? (This applies to ALL medications) Yes, quantity updated to 100 days

## 2025-01-22 ENCOUNTER — OFFICE VISIT (OUTPATIENT)
Dept: MEDICAL GROUP | Facility: MEDICAL CENTER | Age: 75
End: 2025-01-22
Payer: MEDICARE

## 2025-01-22 ENCOUNTER — PATIENT OUTREACH (OUTPATIENT)
Dept: HEALTH INFORMATION MANAGEMENT | Facility: OTHER | Age: 75
End: 2025-01-22

## 2025-01-22 VITALS
HEART RATE: 74 BPM | BODY MASS INDEX: 21.66 KG/M2 | DIASTOLIC BLOOD PRESSURE: 70 MMHG | SYSTOLIC BLOOD PRESSURE: 126 MMHG | HEIGHT: 62 IN | TEMPERATURE: 97.5 F | OXYGEN SATURATION: 98 % | WEIGHT: 117.73 LBS

## 2025-01-22 DIAGNOSIS — K74.60 CIRRHOSIS, NON-ALCOHOLIC (HCC): ICD-10-CM

## 2025-01-22 DIAGNOSIS — K75.4 CHRONIC AUTOIMMUNE HEPATITIS (HCC): ICD-10-CM

## 2025-01-22 DIAGNOSIS — K22.70 BARRETT'S ESOPHAGUS WITHOUT DYSPLASIA: ICD-10-CM

## 2025-01-22 DIAGNOSIS — F43.23 SITUATIONAL MIXED ANXIETY AND DEPRESSIVE DISORDER: ICD-10-CM

## 2025-01-22 DIAGNOSIS — F51.01 PRIMARY INSOMNIA: ICD-10-CM

## 2025-01-22 DIAGNOSIS — I10 ESSENTIAL HYPERTENSION: ICD-10-CM

## 2025-01-22 DIAGNOSIS — M35.9 AUTOIMMUNE DISEASE (HCC): ICD-10-CM

## 2025-01-22 DIAGNOSIS — R76.8 ANA POSITIVE: ICD-10-CM

## 2025-01-22 DIAGNOSIS — M32.19 SYSTEMIC LUPUS ERYTHEMATOSUS (SLE) WITH MULTISYSTEM INVOLVEMENT (HCC): ICD-10-CM

## 2025-01-22 DIAGNOSIS — G43.109 OCULAR MIGRAINE: ICD-10-CM

## 2025-01-22 DIAGNOSIS — R10.10 PAIN OF UPPER ABDOMEN: ICD-10-CM

## 2025-01-22 DIAGNOSIS — F41.8 SITUATIONAL ANXIETY: ICD-10-CM

## 2025-01-22 DIAGNOSIS — F33.0 MILD EPISODE OF RECURRENT MAJOR DEPRESSIVE DISORDER (HCC): ICD-10-CM

## 2025-01-22 DIAGNOSIS — I70.0 AORTIC ATHEROSCLEROSIS (HCC): ICD-10-CM

## 2025-01-22 DIAGNOSIS — F33.1 MODERATE EPISODE OF RECURRENT MAJOR DEPRESSIVE DISORDER (HCC): ICD-10-CM

## 2025-01-22 PROCEDURE — 3074F SYST BP LT 130 MM HG: CPT | Performed by: FAMILY MEDICINE

## 2025-01-22 PROCEDURE — 99214 OFFICE O/P EST MOD 30 MIN: CPT | Performed by: FAMILY MEDICINE

## 2025-01-22 PROCEDURE — 3078F DIAST BP <80 MM HG: CPT | Performed by: FAMILY MEDICINE

## 2025-01-22 RX ORDER — ALPRAZOLAM 0.5 MG
0.5 TABLET ORAL 3 TIMES DAILY PRN
Qty: 90 TABLET | Refills: 5 | Status: SHIPPED | OUTPATIENT
Start: 2025-01-22 | End: 2025-07-21

## 2025-01-22 RX ORDER — FAMOTIDINE 20 MG/1
20 TABLET, FILM COATED ORAL 2 TIMES DAILY
Qty: 180 TABLET | Refills: 2 | Status: SHIPPED | OUTPATIENT
Start: 2025-01-22

## 2025-01-22 ASSESSMENT — PATIENT HEALTH QUESTIONNAIRE - PHQ9
9. THOUGHTS THAT YOU WOULD BE BETTER OFF DEAD, OR OF HURTING YOURSELF: NOT AT ALL
3. TROUBLE FALLING OR STAYING ASLEEP OR SLEEPING TOO MUCH: NEARLY EVERY DAY
8. MOVING OR SPEAKING SO SLOWLY THAT OTHER PEOPLE COULD HAVE NOTICED. OR THE OPPOSITE, BEING SO FIGETY OR RESTLESS THAT YOU HAVE BEEN MOVING AROUND A LOT MORE THAN USUAL: NOT AT ALL
SUM OF ALL RESPONSES TO PHQ9 QUESTIONS 1 AND 2: 2
4. FEELING TIRED OR HAVING LITTLE ENERGY: NEARLY EVERY DAY
5. POOR APPETITE OR OVEREATING: NEARLY EVERY DAY
6. FEELING BAD ABOUT YOURSELF - OR THAT YOU ARE A FAILURE OR HAVE LET YOURSELF OR YOUR FAMILY DOWN: NOT AL ALL
SUM OF ALL RESPONSES TO PHQ QUESTIONS 1-9: 11
1. LITTLE INTEREST OR PLEASURE IN DOING THINGS: MORE THAN HALF THE DAYS
2. FEELING DOWN, DEPRESSED, IRRITABLE, OR HOPELESS: NOT AT ALL
7. TROUBLE CONCENTRATING ON THINGS, SUCH AS READING THE NEWSPAPER OR WATCHING TELEVISION: NOT AT ALL

## 2025-01-22 ASSESSMENT — FIBROSIS 4 INDEX: FIB4 SCORE: 1.82

## 2025-01-22 NOTE — PROGRESS NOTES
Chief Complaint   Patient presents with    Follow-Up     Lupus       Subjective:     HPI:   Karen Dimas presents today with the followin. Cirrhosis, non-alcoholic (HCC)/Chronic autoimmune hepatitis (HCC)/Autoimmune disease (HCC)  Patient has nonalcoholic cirrhosis.  Has chronic autoimmune hepatitis biopsy-proven and consistent with her positive lab tests.  This has been primarily diagnosed as lupus.  Patient continues her treatment with hydroxychloroquine and azathioprine.  She tolerates well.  Patient does have follow-up lab orders which I have asked her to complete.  Emphasized she really needs to do these labs at least every 6 months on her current regimen.    2. Aortic atherosclerosis (HCC)  Patient does have history of aortic atherosclerosis and is tolerating the atorvastatin.  Patient is a former smoker quit over 5 years ago.  Has been smoking episodically when very stressed.  She has multiple severe family stressors.  However, she has quit completely as of today.  She is congratulated.    3. Systemic lupus erythematosus (SLE) with multisystem involvement (HCC)  She continues to have joint pain and stiffness despite the hydroxychloroquine and azathioprine though she states those medications have definitely been helpful and continue to be helpful.  She is due for lab orders, these are active.  She will get them completed within the next 6 weeks.    4. Situational mixed anxiety and depressive disorder/Situational anxiety/Primary insomnia  Patient has severe anxiety and mild depression.  She has major situational stressors.  This affects both daytime anxiety and anxiety associated insomnia.  Patient uses alprazolam 0.5 mg 2 to 3/day.  Patient tries to restrict herself to just nighttime.  However, her family situation is often very challenging.  The anxiety becomes overwhelming.  PDMP review shows no inconsistencies.  Patient states the medication continues to be quite helpful.  She avoids all  alcohol.  No adverse reactions reported or observed.  Medication renewed.    5. Moderate episode of recurrent major depressive disorder (HCC)  The low-dose sertraline continues to be helpful.  Higher doses give her a hangover effect the next day.  Renewals are in place.  Her grandchild depends on her and lives with her.    6. Ocular migraine  Patient has episodic ocular migraine.  This was recently diagnosed by her eye specialist.  No significant headache associated.  No permanent change or lasting change in vision.      7. Gómez's esophagus without dysplasia/Pain of upper abdomen  Patient has history of Gómez's esophagus without precancerous change.  She has had good results from the famotidine twice daily.  She continues to tolerate this well.  This has reduced though not always eliminated her upper abdominal pain.  Denies dysphagia or hematemesis.  Famotidine renewed.  She will get her lab orders completed which include a CBC and CMP.  - famotidine (PEPCID) 20 MG Tab; Take 1 Tablet by mouth 2 times a day.  Dispense: 180 Tablet; Refill: 2      Patient Active Problem List    Diagnosis Date Noted    Moderate episode of recurrent major depressive disorder (HCC) 01/22/2025    Ocular migraine 05/23/2024    Systemic lupus erythematosus (SLE) with multisystem involvement (HCC) 12/14/2023    Situational mixed anxiety and depressive disorder 12/14/2023    Arthritis of foot, right 06/14/2023    Earlobe lesion, left 06/14/2023    Posterior neck pain 12/16/2022    Itching 12/16/2022    Cirrhosis, non-alcoholic (HCC) 07/29/2022    Left ventricular hypokinesis 07/29/2022    Gómez's esophagus without dysplasia 07/29/2022    Cutaneous horn 07/29/2022    Chronic autoimmune hepatitis (HCC) 05/16/2022    Ds DNA antibody positive 05/16/2022    Elevated sedimentation rate 04/20/2022    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    Autoimmune disease (HCC) 04/20/2022    Epigastric abdominal pain 04/20/2022    Aortic atherosclerosis (HCC) 03/01/2022     Myofascial pain on left side 11/30/2021    Muscle tenderness 11/30/2021    History of 2019 novel coronavirus disease (COVID-19) 11/11/2021    Bilateral radicular pain 01/20/2021    Muscle spasm 01/20/2021    Mixed hyperlipidemia 01/20/2021    Vitamin D deficiency 01/20/2021    Intervertebral cervical disc disorder with myelopathy, cervical region 11/29/2017    Hyperglobulinemia 12/19/2016    Osteoporosis 12/18/2016    Weakness 07/31/2015    Stress-induced cardiomyopathy 09/30/2014    Situational anxiety 09/29/2014    Microscopic colitis 09/28/2014    Essential hypertension 09/26/2014    Insomnia 09/26/2014    Gastritis 09/25/2014       Current medicines (including changes today)  Current Outpatient Medications   Medication Sig Dispense Refill    ALPRAZolam (XANAX) 0.5 MG Tab Take 1 Tablet by mouth 3 times a day as needed for Sleep or Anxiety for up to 180 days. 90 tablets is a 30 day quantity  Indications: Feeling Anxious 90 Tablet 5    famotidine (PEPCID) 20 MG Tab Take 1 Tablet by mouth 2 times a day. 180 Tablet 2    losartan (COZAAR) 100 MG Tab TAKE 1 TABLET BY MOUTH EVERY  Tablet 3    sertraline (ZOLOFT) 25 MG tablet TAKE 1 TABLET BY MOUTH EVERYDAY AT BEDTIME 90 Tablet 3    promethazine (PHENERGAN) 25 MG Tab Take 1 Tablet by mouth every 6 hours as needed for Nausea/Vomiting. 60 Tablet 4    hydroxychloroquine (PLAQUENIL) 200 MG Tab Take 1 Tablet by mouth every day. 90 Tablet 3    azaTHIOprine (IMURAN) 50 MG Tab Take 1.5 Tablets by mouth 2 times a day. 270 Tablet 3    pantoprazole (PROTONIX) 40 MG Tablet Delayed Response TAKE 1 TABLET BY MOUTH EVERY DAY. CONTINUE WITH FAMOTIDINE AS WELL. 90 Tablet 3    atorvastatin (LIPITOR) 20 MG Tab TAKE 1 TABLET BY MOUTH EVERYDAY AT BEDTIME 100 Tablet 3    amLODIPine (NORVASC) 5 MG Tab TAKE 1 TABLET BY MOUTH EVERY DAY 90 Tablet 3     No current facility-administered medications for this visit.       Allergies   Allergen Reactions    Codeine Vomiting     [id number]     "Cefdinir      Rash    Oxycodone Vomiting    Tramadol Vomiting     itching       ROS: As per HPI       Objective:     /70   Pulse 74   Temp 36.4 °C (97.5 °F) (Temporal)   Ht 1.575 m (5' 2\")   Wt 53.4 kg (117 lb 11.6 oz)   SpO2 98%  Body mass index is 21.53 kg/m².    Physical Exam:  Constitutional: Well-developed and well-nourished. Not diaphoretic. No distress. Lucid and fluent.  Skin: Skin is warm and dry. No rash noted.  Head: Atraumatic without lesions.  Eyes: Conjunctivae and extraocular motions are normal. Pupils are equal, round, and reactive to light. No scleral icterus.   Ears:  External ears unremarkable.   Neck: Supple, trachea midline. No thyromegaly present. No cervical or supraclavicular lymphadenopathy. No JVD or carotid bruits appreciated  Cardiovascular: Regular rate and rhythm.  Normal S1, S2 without murmur appreciated.  Chest: Effort normal. Clear to auscultation throughout. No adventitious sounds.   Extremities: No cyanosis, clubbing, erythema, nor edema.   Neurological: Alert and oriented x 3. No tremor appreciated.  Psychiatric:  Behavior, mood, and affect are appropriate.       Assessment and Plan:     74 y.o. female with the following issues:    1. Cirrhosis, non-alcoholic (HCC)        2. Chronic autoimmune hepatitis (HCC)        3. Autoimmune disease (HCC)        4. Aortic atherosclerosis (HCC)        5. Systemic lupus erythematosus (SLE) with multisystem involvement (HCC)        6. Situational mixed anxiety and depressive disorder  ALPRAZolam (XANAX) 0.5 MG Tab      7. Moderate episode of recurrent major depressive disorder (HCC)        8. Situational anxiety  ALPRAZolam (XANAX) 0.5 MG Tab      9. Primary insomnia  ALPRAZolam (XANAX) 0.5 MG Tab      10. Ocular migraine        11. Gómez's esophagus without dysplasia  famotidine (PEPCID) 20 MG Tab      12. Pain of upper abdomen  famotidine (PEPCID) 20 MG Tab            Followup: Return in about 3 months (around 4/22/2025), or if " symptoms worsen or fail to improve.

## 2025-01-22 NOTE — CARE PLAN
Problem: Patient Stated Problem: Health concerns related to systemic lupus erythematous/chronic autoimmune hepatitis  Goal: Patient Stated Goal: Manage Systemic lupus erythematous/chronic autoimmune hepatitis/long term   Outcome: Progressing  Intervention: Fifth Patient Stated Intervention: Stress management   Note: Provided resources for online Lupus support groups via the [employer] [place]      Problem: Patient Stated Problem: Financial insecurity  Goal: Patient Stated Goal: Follow with need for community resources/long term 11/26/24-5/31/25  Outcome: Progressing  Note: Provided resources on home food deliveries

## 2025-01-22 NOTE — PROGRESS NOTES
Reason for Follow-Up:  Monthly outreach follow     Current Health Status:  Stable hypertension, Lupus, autoimmune hepatitis    Medical Updates:    No recent hospital admissions    Medication Updates:    No recent medication changes     Symptoms:    Madelyn reports she is having a good day regarding her lupus/pain symptoms.       Plan of Care Goals and Progress:    Goal 1. Education on management of Lupus-stress management      Progress:  Progressing, Patient receptive to online support groups       Barriers:  Age, progression of disease process, knowledge of healthcare, habits , multiple family and financial stressors     Interventions:  Resources from the [employer] website regarding online support group information     Education:  Showed Madelyn different aspects of the [employer] website    Goal 2. Hypertension education      Progress:  Progressing, discussed medication adherence      Barriers:  Age, progression of disease process, knowledge of healthcare, habits, multiple family and financial stressors     Interventions:  Provided written education on hypertension     Education:  Discussed medication adherence      Patient's Concerns and Feedback (Self Management of Care):   Met with Madelyn in the clinic prior to her PCP appointment.  Madelyn reports that she is having a good day today.  She discussed the ups and downs/good and bad days with her diagnosis of lupus.  Provided Madelyn with resources from the [employer] website regarding online support group information.  Discussed with Madelyn that her smart phone can be used for these support groups and the need for the [employer] platform to be downloaded.  She discussed that her grandson's can likely help her with this.  Showed Madelyn different aspects of the [employer] website.  Also provided Madelyn with written health education on hypertension and managing her blood pressure.  Madelyn discussed her blood pressure  medication.  She reports she takes it faithfully and feels her blood pressure is under control.  Provided Madelyn with resources on community to live food delivery agencies.  She denied any other needs at this time.  Encouraged to call with any questions or concerns.    Next Steps:     Follow-Up Plan:  February 2025      Appointments:  1/30 outpatient lab, 4/25 PCP      Contact Information:  Call [phone] with any questions or concerns.

## 2025-01-30 ENCOUNTER — HOSPITAL ENCOUNTER (OUTPATIENT)
Dept: LAB | Facility: MEDICAL CENTER | Age: 75
End: 2025-01-30
Attending: FAMILY MEDICINE
Payer: MEDICARE

## 2025-01-30 ENCOUNTER — RESEARCH ENCOUNTER (OUTPATIENT)
Facility: MEDICAL CENTER | Age: 75
End: 2025-01-30

## 2025-01-30 DIAGNOSIS — E55.9 VITAMIN D DEFICIENCY: ICD-10-CM

## 2025-01-30 DIAGNOSIS — I10 ESSENTIAL HYPERTENSION: ICD-10-CM

## 2025-01-30 DIAGNOSIS — K29.30 CHRONIC SUPERFICIAL GASTRITIS WITHOUT BLEEDING: ICD-10-CM

## 2025-01-30 DIAGNOSIS — E78.2 MIXED HYPERLIPIDEMIA: ICD-10-CM

## 2025-01-30 DIAGNOSIS — K74.60 CIRRHOSIS, NON-ALCOHOLIC (HCC): ICD-10-CM

## 2025-01-30 LAB
25(OH)D3 SERPL-MCNC: 28 NG/ML (ref 30–100)
ALBUMIN SERPL BCP-MCNC: 4.6 G/DL (ref 3.2–4.9)
ALBUMIN/GLOB SERPL: 1.3 G/DL
ALP SERPL-CCNC: 65 U/L (ref 30–99)
ALT SERPL-CCNC: 11 U/L (ref 2–50)
AMMONIA PLAS-SCNC: 26 UMOL/L (ref 11–45)
ANION GAP SERPL CALC-SCNC: 11 MMOL/L (ref 7–16)
AST SERPL-CCNC: 24 U/L (ref 12–45)
BILIRUB SERPL-MCNC: 1 MG/DL (ref 0.1–1.5)
BUN SERPL-MCNC: 8 MG/DL (ref 8–22)
CALCIUM ALBUM COR SERPL-MCNC: 9.4 MG/DL (ref 8.5–10.5)
CALCIUM SERPL-MCNC: 9.9 MG/DL (ref 8.5–10.5)
CHLORIDE SERPL-SCNC: 102 MMOL/L (ref 96–112)
CHOLEST SERPL-MCNC: 181 MG/DL (ref 100–199)
CO2 SERPL-SCNC: 22 MMOL/L (ref 20–33)
CREAT SERPL-MCNC: 0.79 MG/DL (ref 0.5–1.4)
ERYTHROCYTE [DISTWIDTH] IN BLOOD BY AUTOMATED COUNT: 50.4 FL (ref 35.9–50)
GFR SERPLBLD CREATININE-BSD FMLA CKD-EPI: 79 ML/MIN/1.73 M 2
GLOBULIN SER CALC-MCNC: 3.5 G/DL (ref 1.9–3.5)
GLUCOSE SERPL-MCNC: 87 MG/DL (ref 65–99)
HCT VFR BLD AUTO: 45.5 % (ref 37–47)
HDLC SERPL-MCNC: 89 MG/DL
HGB BLD-MCNC: 15.8 G/DL (ref 12–16)
LDLC SERPL CALC-MCNC: 67 MG/DL
MCH RBC QN AUTO: 34.1 PG (ref 27–33)
MCHC RBC AUTO-ENTMCNC: 34.7 G/DL (ref 32.2–35.5)
MCV RBC AUTO: 98.3 FL (ref 81.4–97.8)
PLATELET # BLD AUTO: 287 K/UL (ref 164–446)
PMV BLD AUTO: 9.5 FL (ref 9–12.9)
POTASSIUM SERPL-SCNC: 4.3 MMOL/L (ref 3.6–5.5)
PROT SERPL-MCNC: 8.1 G/DL (ref 6–8.2)
RBC # BLD AUTO: 4.63 M/UL (ref 4.2–5.4)
SODIUM SERPL-SCNC: 135 MMOL/L (ref 135–145)
TRIGL SERPL-MCNC: 126 MG/DL (ref 0–149)
TSH SERPL-ACNC: 1.37 UIU/ML (ref 0.35–5.5)
WBC # BLD AUTO: 5.2 K/UL (ref 4.8–10.8)

## 2025-01-30 PROCEDURE — 36415 COLL VENOUS BLD VENIPUNCTURE: CPT

## 2025-01-30 PROCEDURE — 80053 COMPREHEN METABOLIC PANEL: CPT

## 2025-01-30 PROCEDURE — 82105 ALPHA-FETOPROTEIN SERUM: CPT

## 2025-01-30 PROCEDURE — 82140 ASSAY OF AMMONIA: CPT

## 2025-01-30 PROCEDURE — 80061 LIPID PANEL: CPT

## 2025-01-30 PROCEDURE — 85027 COMPLETE CBC AUTOMATED: CPT

## 2025-01-30 PROCEDURE — 82306 VITAMIN D 25 HYDROXY: CPT

## 2025-01-30 PROCEDURE — 84443 ASSAY THYROID STIM HORMONE: CPT

## 2025-01-30 NOTE — RESEARCH NOTE
Subject ID: JULISA-  Site Number: 02  Cohort: Control    Participation in the [id number] Prospective Study of oncRNA Stratification of Cancer by Size and Stage clinical trial was discussed with patient today. All aspects of the study purpose and procedures were explained.  Subject was given ample time to review the consent and all questions were answered to their satisfaction. Patient aware that the clinical trial is voluntary and they may withdraw consent at any time without affecting the level of care they receive. Subject signed consent without coercion and undue influence and was given a copy of the signed consent. No study-related procedures took place prior to consenting and all assessments were conducted per protocol.    INCLUSION/EXCLUSION  Does the patient have any of the following exclusionary conditions? no  Age <= 18  Prior cancer diagnosis (with or without treatment) with the exception of non-melanoma skin cancer that has NOT been treated  Any surgery within one month of collection  Received non-cancer systemic immune modulation therapy within 60 days of collection (ex. Monoclonal antibodies)  History of organ transplantation  Infusion of any blood products within within 30 days of collection  Current active COVID-19 infection  Any prior history of cancer therapy (e.g., surgical, radiation, or medical including neoadjuvant treatment)  Current or prior pregnancy within 12 months of collection  Unable or unwilling to provide informed consent  CONTROL ONLY: History (past or present) of HIV, Hepatitis A, D, E (either viral or elevated liver function tests not due to cancer), TB, any kind of prion disorder (e.g. YAMILET) or other infectious pathogens are NOT eligible    SAMPLE COLLECTION & TYPE  Lab Collection SOP Version Used: 1.0  Date of collection: 30 JAN 2025  Time of collection: 0938  Date patient signed ICF: 30 JAN 2025  Date of last meal: 29 JAN 2025  Time of Last meal: 2100  Sample Storme: Fresh  Blood    HEIGHT & WEIGHT  Height: 157 cm  Weight: 53.4 kg    ETHNICITY & RACE  Ethnicity: Not   Race: /White    LABORATORY VALUES  ALP: 65 U/L  ALT: 11 U/L  AST: 24 U/L  GGT: unknown   Creatinine: 0.79 mg/dL  HbA1c: unknown    INFECTIOUS DISEASE HAZARD ALERT  None known    SMOKING HISTORY  Former Smoker   Start: 24 Oct 1997 - End: 22 Jan 2025  27 years x 0.5 ppd = 14 pack years    ALCOHOL HISTORY  Does the patient drink beer, wine, or liquor? No    FAMILY MEDICAL HISTORY  Has anyone in your immediate biological family (grandparents, parents, and/or siblings) ever been diagnosed with cancer of any kind? YES  Grandfather - colon - unknown outcome     Date of last physical exam: 22 Jan 2025    Have you received the vaccine for COVID-19? Yes  If yes: Pfizer  Number of doses received: 2  Date of last dose: 04/16/2021     Have you tested postive for COVID-19? Yes  unknown    Have you done a pulmonary function test? Yes  Date of last test: 24 Feb 2016  FEV1 Value: 99%  FVC Value: 92    Have you received any Vaccinations in the past 12 months? Yes  Vaccine:Hepatitis A Vaccine, Adult  Number of Doses received: 1  Number of doses required: 1  Date of most recent dose: 22 Jul 2024    CANCER SCREENINGS  Colonoscopy: YES - Reason: Medically Indicated/Follow-up Abdominal Pain  Date of most recent: 28 Sep 2014  Clinically Significant? No     Sigmoidoscopy: YES - Reason: Medically Indicated/Follow-up Abdominal Pain  Date of most recent: 28 Sep 2014  Clinically Significant? No     Mammography:YES - Reason: Routine  Date of most recent: 26 Oct 2022  Clinically Significant? No   Breast Composition:  a - Almost entirely fatty  BIRADS Assessment: R2 - CATEGORY 2: BENIGN FINDING(S)     Chest CT: YES - Reason: Medically Indicated/Follow-up Status post liver biopsy now with severe pain, shortness of breath, concerning for possible pneumothorax, intra-abdominal hemorrhage  Date of most recent: 10 May 2022   "Clinically Significant? No     Additional Screenings: Yes  DEXA - YES - Reason: Medically Indicated/Follow-up: Postmenopausal, hysterectomy, tobacco use, rheumatoid arthritis, osteopenic L1, L2 and L4 levels, osteoporotic proximal left femur  Date of most recent: 18 May 2023  Clinically Significant? YES - osteoporosis      MENOPAUSAL STATUS   Menopausal Status: Postmenopausal  Date of last menstrual cycle (CD1) (mm/dd/yyyy): unknown (total hysterectomy in \"early 80's\")    MEDICAL CONDITIONS  Depression - Start Date: 26 Sep 2014  Ongoing? Yes  Medication Prescribed? Yes  Diagnosed by a professional? Yes     High Blood Pressure - Start Date: 25 Sep 2014  Ongoing? Yes  Medication Prescribed? Yes  Diagnosed by a professional? Yes     High Cholesterol - Start Date: 31 Jul 2015  Ongoing? Yes  Medication Prescribed? Yes  Diagnosed by a professional? Yes     Inflammatory Bowel Disease - Start Date: 25 Sep 2014  Ongoing? Yes  Medication Prescribed? Yes  Diagnosed by a professional? Yes   NOTE: Colitis, chronic gastritis, Gómez's Esophagitis, GERD    Others not listed:  Systemic lupus erythematosus with multisystem involvement - Start Date: 16 May 2022  Ongoing? Yes  Medication Prescribed? Yes  Diagnosed by a professional? Yes     Chronic autoimmune hepatitis - Start Date: 13 May 2022  Ongoing? Yes  Medication Prescribed? Yes  Diagnosed by a professional? Yes       CURRENT MEDICATIONS  Currently taking or have stopped taking within the last 30 days   (Generic drug, directions, indication, start date, stop date)  Alprazolam 0.5 mg tab - 1 tab by mouth 3 times a day as needed for Sleep or Anxiety - Start date: 14 Jun 2021, ongoing    Famotidine 20 mg tab - 1 tab by mouth 2 times a day for Gómez's esophagus without dysplasia, upper abdominal pain - Start date: 20 Apr 2022, ongoing    Losartan 100 mg tab - 1 tab by mouth every day for hypertension - Start date: 01 Mar 2022, ongoing    Sertraline 25 mg tab " - 1 tab by mouth every day for anxiety/depression - Start date: 14 Dec 2023, ongoing    Promethazine 25 mg tab - 1 tab by mouth every 6 hours as needed for nausea/vomiting - Start date: 22 July 2024, ongoing    Hydroxychloroquine 200 mg tab - 1 tab by mouth every day for systemic lupus erythematosus with multisystem involvement, and chronic autoimmune hepatitis - Start date: 18 May 2022, ongoing    Azathioprine (Imuran) 50 mg tab - 1.5 tab by mouth 2 times a day for systemic lupus erythematosus with multisystem involvement, and chronic autoimmune hepatitis - Start date: 31 Aug 2022, ongoing    Pantoprazole 40 mg tab - 1 tab by mouth every day for Gómez's esophagus without dysplasia, chronic superficial gastritis without bleeding, epigastric abdominal pain - Start date: 06 Apr 2021, ongoing    Atorvastatin 20 mg tab - 1 tab by mouth every day for hyperlipidemia - Start date: 30 Sep 2014, ongoing    Amlodipine 5 mg tab - 1 tab by mouth every day for hypertension - Start date: 04 Apr 2022, ongoing

## 2025-01-31 LAB — AFP-TM SERPL-MCNC: 3 NG/ML (ref 0–9)

## 2025-02-12 ENCOUNTER — RESULTS FOLLOW-UP (OUTPATIENT)
Dept: MEDICAL GROUP | Facility: MEDICAL CENTER | Age: 75
End: 2025-02-12

## 2025-02-19 ENCOUNTER — TELEPHONE (OUTPATIENT)
Dept: HEALTH INFORMATION MANAGEMENT | Facility: OTHER | Age: 75
End: 2025-02-19
Payer: MEDICARE

## 2025-02-19 NOTE — TELEPHONE ENCOUNTER
Madelyn left a message asking for a return call. Madelyn reports she has been vomiting since midnight, she has been unable to tolerate fluids, she feels weak and has chills. She feels that she is very dehydrated. Unsure if she has a fever. Encouraged Madelyn to continue take small sips of fluids slowly. Discussed the risks of dehydration. If she continued to be unable to tolerate fluids and nausea is unmanageable, she can report the emergency room. There are no available appointments at the clinic

## 2025-02-20 ENCOUNTER — PATIENT OUTREACH (OUTPATIENT)
Dept: HEALTH INFORMATION MANAGEMENT | Facility: OTHER | Age: 75
End: 2025-02-20
Payer: MEDICARE

## 2025-02-20 DIAGNOSIS — M32.19 SYSTEMIC LUPUS ERYTHEMATOSUS (SLE) WITH MULTISYSTEM INVOLVEMENT (HCC): ICD-10-CM

## 2025-02-20 DIAGNOSIS — I10 ESSENTIAL HYPERTENSION: ICD-10-CM

## 2025-02-20 DIAGNOSIS — K74.60 CIRRHOSIS, NON-ALCOHOLIC (HCC): ICD-10-CM

## 2025-02-20 DIAGNOSIS — F33.0 MILD EPISODE OF RECURRENT MAJOR DEPRESSIVE DISORDER (HCC): ICD-10-CM

## 2025-02-20 DIAGNOSIS — K75.4 CHRONIC AUTOIMMUNE HEPATITIS (HCC): ICD-10-CM

## 2025-02-21 NOTE — PROGRESS NOTES
Reason for Follow-Up:  Monthly outreach follow up    Current Health Status:  Stable hypertension, Lupus, autoimmune hepatitis    Medical Updates:    ER Visit 2/19 to [facility]-acute nausea/vomiting    Medication Updates:    Reports PRN antiemetic     Symptoms:    Reports her acute nausea/vomiting is much improved    Plan of Care Goals and Progress:    Goal 1. Education on management of Lupus     Progress:  Declined conversation regarding lupus, as she is tired and recovering from her acute illness/nauseas/vomiting.       Barriers:  Age, progression of disease process, knowledge of healthcare, habits, multiple family and financial stressors     Interventions:  Reviewed next PCP follow up     Education:  Continue Lupus education at next monthly outreach follow up.      Goal 2. Hypertension education      Progress:  Declined conversation regarding hypertension, as she is tired and recovering from her acute illness/nauseas/vomiting.       Barriers:  Age, progression of disease process, knowledge of healthcare, habits , multiple family and financial stressors     Interventions:  Reviewed next PCP follow up     Education:  Continue hypertension education at next monthly outreach follow up.        Patient's Concerns and Feedback (Self Management of Care):   Spoke with Madelyn for monthly outreach follow-up.  Madelyn was admitted to [facility] ER on February 19 for acute nausea and vomiting with dehydration.  Madelyn reports she is much improved since her ER visit.  She is tolerating liquids well she reports it is still a little difficult to eat and she does continue to feel weak.  Reviewed encouraging fluid intake and bland soft foods to start.  Encouraged frequent small meals and snacks versus a large meal.  Overall Madelyn feels as though she is recovering.  She declined conversation today regarding lupus and hypertension education as she is tired and continues to recover from her acute illness.  She denies any needs at  this time, encouraged to call with any questions or concerns. Chart reviewed for Quarterly Assessment, patient continues to qualify and would benefit from PCM program.     Next Steps:     Follow-Up Plan:  March 2025      Appointments:  4/25 PCP      Contact Information:  Call [phone] with any questions or concerns.

## 2025-03-21 ENCOUNTER — PATIENT OUTREACH (OUTPATIENT)
Dept: MEDICAL GROUP | Facility: MEDICAL CENTER | Age: 75
End: 2025-03-21
Payer: MEDICARE

## 2025-03-21 ENCOUNTER — PATIENT OUTREACH (OUTPATIENT)
Dept: HEALTH INFORMATION MANAGEMENT | Facility: OTHER | Age: 75
End: 2025-03-21
Payer: MEDICARE

## 2025-03-21 DIAGNOSIS — M32.19 SYSTEMIC LUPUS ERYTHEMATOSUS (SLE) WITH MULTISYSTEM INVOLVEMENT (HCC): ICD-10-CM

## 2025-03-21 DIAGNOSIS — K75.4 CHRONIC AUTOIMMUNE HEPATITIS (HCC): ICD-10-CM

## 2025-03-21 DIAGNOSIS — R76.8 ANA POSITIVE: ICD-10-CM

## 2025-03-21 DIAGNOSIS — F33.0 MILD EPISODE OF RECURRENT MAJOR DEPRESSIVE DISORDER (HCC): ICD-10-CM

## 2025-03-21 DIAGNOSIS — K74.60 CIRRHOSIS, NON-ALCOHOLIC (HCC): ICD-10-CM

## 2025-03-21 DIAGNOSIS — I10 ESSENTIAL HYPERTENSION: ICD-10-CM

## 2025-03-21 NOTE — PROGRESS NOTES
"Reason for Follow-Up:  Monthly outreach follow up    Current Health Status:  Stable hypertension, systemic lupus erythematosus, autoimmune hepatitis    Medical Updates:    Recent scheduled EDG with GI     Medication Updates:    No Medication changes    Symptoms:    Report feeling fatigued,     Plan of Care Goals and Progress:    Goal 1. Education on management of Lupus     Progress:  Declined conversation regarding lupus, her focus today is on GI follow up and family stressors       Barriers:  Age, progression of disease process, knowledge of healthcare, habits, multiple family and financial stressors     Interventions:  Reviewed next PCP follow up     Education:  Continue Lupus education at next monthly outreach follow up.      Goal 2. Hypertension education      Progress:  Declined conversation regarding hypertension, her focus today is on GI follow up and family stressors       Barriers:  Age, progression of disease process, knowledge of healthcare, habits, multiple family and financial stressors     Interventions:  Reviewed next PCP follow up     Education:  Continue hypertension education at next monthly outreach follow up.       Patient's Concerns and Feedback (Self Management of Care):   Spoke with Madelyn for monthly outreach follow-up.  Madelyn reports that she is doing \"all right.\"  She is concerned over a letter she received from GI regarding the results of her EGD.  Letter noted to be on file in her chart.  Reviewed the letter with Madelyn and encouraged her to follow-up with GI for all of her questions.  She states she has an appointment scheduled for June 24.  Assured Madelyn that her follow-up was not emergent in June 24 is a reasonable time for her follow-up appointment.  Discussed her next PCP follow-up on 4/25.  Encouraged her to discuss any questions or concerns with Dr. De Jesus as well.  Madelyn discussed multiple family stressors at the moment.  Allowed Madelyn to verbalize her feelings.  Discussed the 988 crisis " hotline.  Encouraged Madelyn to reach out for support as well as resources from 988.  She declined discussion regarding her current lupus symptoms and stable hypertension.  Continue education at her next months follow-up.  She denied any needs at this time.  Encouraged to call with any questions or concerns    Next Steps:     Follow-Up Plan:  April 2025      Appointments:  4/25 PCP, reports GI follow up 6/24     Contact Information:  Call [phone] with any questions or concerns.

## 2025-04-08 DIAGNOSIS — I10 ESSENTIAL HYPERTENSION: ICD-10-CM

## 2025-04-08 RX ORDER — AMLODIPINE BESYLATE 5 MG/1
5 TABLET ORAL DAILY
Qty: 100 TABLET | Refills: 3 | Status: SHIPPED | OUTPATIENT
Start: 2025-04-08

## 2025-04-29 ENCOUNTER — PATIENT OUTREACH (OUTPATIENT)
Dept: HEALTH INFORMATION MANAGEMENT | Facility: OTHER | Age: 75
End: 2025-04-29
Payer: MEDICARE

## 2025-04-29 DIAGNOSIS — K74.60 CIRRHOSIS, NON-ALCOHOLIC (HCC): ICD-10-CM

## 2025-04-29 DIAGNOSIS — K75.4 CHRONIC AUTOIMMUNE HEPATITIS (HCC): ICD-10-CM

## 2025-04-29 DIAGNOSIS — F33.0 MILD EPISODE OF RECURRENT MAJOR DEPRESSIVE DISORDER (HCC): ICD-10-CM

## 2025-04-29 DIAGNOSIS — M32.19 SYSTEMIC LUPUS ERYTHEMATOSUS (SLE) WITH MULTISYSTEM INVOLVEMENT (HCC): ICD-10-CM

## 2025-04-29 DIAGNOSIS — I10 ESSENTIAL HYPERTENSION: ICD-10-CM

## 2025-04-29 NOTE — PROGRESS NOTES
Reason for Follow-Up:  Monthly outreach follow up    Current Health Status:  Stable hypertension, systemic lupus erythematosus, autoimmune hepatitis    Medical Updates:    [facility] ER on February 19 for acute nausea and vomiting with dehydration.     Medication Updates:    No medication changes since last monthly outreach     Symptoms:    Madelyn reported recurrence of acute nausea and vomiting     Plan of Care Goals and Progress:    Goal 1. Education on management of Lupus     Progress:  Declined conversation regarding lupus, her focus today is on recurrence of acute nausea and vomiting       Barriers:  Age, progression of disease process, knowledge of healthcare, habits, multiple family and financial stressors     Interventions: Encouraged follow-up at urgent care or the ED     Education:  Continue Lupus education at next monthly outreach follow up.      Goal 2. Hypertension education      Progress:  Declined conversation regarding hypertension, her focus today is on recurrence of acute nausea and vomiting       Barriers:  Age, progression of disease process, knowledge of healthcare, habits, multiple family and financial stressors     Interventions:   Encouraged follow-up at urgent care or the ED     Education:  Continue hypertension education at next monthly outreach follow up.        Patient's Concerns and Feedback (Self Management of Care):   Called Madelyn for monthly outreach follow-up.  She states she is not doing well, she reports recurrence of acute nausea and vomiting.  She states she was nauseated last night and started vomiting this morning around 700am. .  She has asked if Dr. Ontiveros will give her a prescription for nausea medication.  Discussed with Madelyn that Dr. Ontiveros is not in the office this afternoon. Offer to assist in making an appointment at the clinic. She declined.  Encouraged her to follow-up in urgent care or ER.  She states it is not severe enough for either of those. Encouraged clear  liquids, small sips, and encouraged her to stay hydrated.  She stated she had to go because she was picking up her great grandson from school and asked to end call.  She denied any other needs at this time encouraged to call with any questions or concerns.  Again encouraged her to follow-up at urgent care or ER.     Next Steps:     Follow-Up Plan:  May 2025      Appointments:  7/23 PCP      Contact Information:  Call [phone] with any questions or concerns.

## 2025-04-30 DIAGNOSIS — R11.2 NAUSEA AND VOMITING IN ADULT: ICD-10-CM

## 2025-04-30 RX ORDER — PROMETHAZINE HYDROCHLORIDE 25 MG/1
25 TABLET ORAL EVERY 6 HOURS PRN
Qty: 60 TABLET | Refills: 4 | Status: SHIPPED | OUTPATIENT
Start: 2025-04-30

## 2025-04-30 NOTE — PROGRESS NOTES
Return call placed to Madelyn, reviewed phenergan prescription sent to her pharmacy by Dr. De Jesus. Encouraged to make an appointment at the clinic if symptoms do no improve. Reviewed tips for hydration and nutrition. She denied any other needs at this time. Encouraged to call with any questions or concerns.

## 2025-05-21 ENCOUNTER — TELEPHONE (OUTPATIENT)
Dept: HEALTH INFORMATION MANAGEMENT | Facility: OTHER | Age: 75
End: 2025-05-21

## 2025-05-29 ENCOUNTER — PATIENT OUTREACH (OUTPATIENT)
Dept: HEALTH INFORMATION MANAGEMENT | Facility: OTHER | Age: 75
End: 2025-05-29
Payer: MEDICARE

## 2025-05-29 DIAGNOSIS — I10 ESSENTIAL HYPERTENSION: Primary | ICD-10-CM

## 2025-05-29 DIAGNOSIS — K75.4 CHRONIC AUTOIMMUNE HEPATITIS (HCC): ICD-10-CM

## 2025-05-29 DIAGNOSIS — K74.60 CIRRHOSIS, NON-ALCOHOLIC (HCC): ICD-10-CM

## 2025-05-29 DIAGNOSIS — M32.19 SYSTEMIC LUPUS ERYTHEMATOSUS (SLE) WITH MULTISYSTEM INVOLVEMENT (HCC): ICD-10-CM

## 2025-05-29 PROCEDURE — 99490 CHRNC CARE MGMT STAFF 1ST 20: CPT | Performed by: FAMILY MEDICINE

## 2025-05-29 NOTE — PROGRESS NOTES
Reason for Follow-Up:  Monthly outreach follow up    Current Health Status:  Stable hypertension, systemic lupus erythematosus, autoimmune hepatitis    Medical Updates:    No ER visits or hospitalizations since last monthly outreach     Medication Updates:    No orders to change medications  since last monthly outreach     Symptoms:    Reports she has stopped taking azathioprine due to concerns for side effects     Plan of Care Goals and Progress:    Goal 1. Education on management of Lupus     Progress:  Reports she has stopped taking azathioprine due to concerns for side effects. She is concerned azathioprine will cause further liver damage.       Barriers:  Age, progression of disease process, knowledge of healthcare, habits, multiple family and financial stressors     Interventions:  Reviewed next PCP follow up on 7/23     Education:  Encouraged Madelyn to talk with her Provider before stopping any mediation.  Reviewed indications for her medications. Reviewed with Madelyn that her liver function tests are monitored every six months and have been stable. Will follow up with PCP     Goal 2. HTN Education      Progress:  Declined conversation regarding hypertension      Barriers:  Age, progression of disease process, knowledge of healthcare, habits, multiple family and financial stressors     Interventions:  Reviewed next PCP follow up on 7/23     Education:  Continue hypertension education at next monthly outreach follow up      Patient's Concerns and Feedback (Self Management of Care):   Spoke with Madelyn for monthly outreach follow-up.  Reviewed lupus education as above, will follow-up with PCP regarding further medication education. Chart reviewed for Quarterly Assessment, patient continues to qualify and would benefit from PCM program.  Madelyn is due for an annual case review, she continues to qualify for the PCM program but her focus today is on medication management will follow-up for her annual case review at next  monthly outreach.    Next Steps:     Follow-Up Plan:  June 2024      Appointments:  7/23 PCP      Contact Information:  Call [phone] with any questions or concerns.

## 2025-05-30 NOTE — PROGRESS NOTES
Three attempts to call Madelyn and review medication recommendations from Dr. De Jesus to continue azathioprine. No answer, left message

## 2025-06-26 ENCOUNTER — PATIENT OUTREACH (OUTPATIENT)
Dept: HEALTH INFORMATION MANAGEMENT | Facility: OTHER | Age: 75
End: 2025-06-26
Payer: MEDICARE

## 2025-06-26 DIAGNOSIS — K75.4 CHRONIC AUTOIMMUNE HEPATITIS (HCC): ICD-10-CM

## 2025-06-26 DIAGNOSIS — K74.60 CIRRHOSIS, NON-ALCOHOLIC (HCC): ICD-10-CM

## 2025-06-26 DIAGNOSIS — I10 ESSENTIAL HYPERTENSION: Primary | ICD-10-CM

## 2025-06-26 DIAGNOSIS — M32.19 SYSTEMIC LUPUS ERYTHEMATOSUS (SLE) WITH MULTISYSTEM INVOLVEMENT (HCC): ICD-10-CM

## 2025-06-26 SDOH — ECONOMIC STABILITY: FOOD INSECURITY: WITHIN THE PAST 12 MONTHS, THE FOOD YOU BOUGHT JUST DIDN'T LAST AND YOU DIDN'T HAVE MONEY TO GET MORE.: OFTEN TRUE

## 2025-06-26 SDOH — ECONOMIC STABILITY: INCOME INSECURITY: IN THE LAST 12 MONTHS, WAS THERE A TIME WHEN YOU WERE NOT ABLE TO PAY THE MORTGAGE OR RENT ON TIME?: NO

## 2025-06-26 SDOH — ECONOMIC STABILITY: FOOD INSECURITY: WITHIN THE PAST 12 MONTHS, YOU WORRIED THAT YOUR FOOD WOULD RUN OUT BEFORE YOU GOT MONEY TO BUY MORE.: OFTEN TRUE

## 2025-06-26 ASSESSMENT — SOCIAL DETERMINANTS OF HEALTH (SDOH)
WITHIN THE LAST YEAR, HAVE YOU BEEN KICKED, HIT, SLAPPED, OR OTHERWISE PHYSICALLY HURT BY YOUR PARTNER OR EX-PARTNER?: NO
HOW OFTEN DO YOU ATTEND CHURCH OR RELIGIOUS SERVICES?: NEVER
WITHIN THE LAST YEAR, HAVE YOU BEEN AFRAID OF YOUR PARTNER OR EX-PARTNER?: NO
HOW OFTEN DO YOU ATTENT MEETINGS OF THE CLUB OR ORGANIZATION YOU BELONG TO?: NEVER
IN THE PAST 12 MONTHS, HAS THE ELECTRIC, GAS, OIL, OR WATER COMPANY THREATENED TO SHUT OFF SERVICE IN YOUR HOME?: NO
HOW OFTEN DO YOU GET TOGETHER WITH FRIENDS OR RELATIVES?: MORE THAN THREE TIMES A WEEK
WITHIN THE LAST YEAR, HAVE YOU BEEN HUMILIATED OR EMOTIONALLY ABUSED IN OTHER WAYS BY YOUR PARTNER OR EX-PARTNER?: NO
DO YOU BELONG TO ANY CLUBS OR ORGANIZATIONS SUCH AS CHURCH GROUPS UNIONS, FRATERNAL OR ATHLETIC GROUPS, OR SCHOOL GROUPS?: NO
IN A TYPICAL WEEK, HOW MANY TIMES DO YOU TALK ON THE PHONE WITH FAMILY, FRIENDS, OR NEIGHBORS?: MORE THAN THREE TIMES A WEEK
HOW HARD IS IT FOR YOU TO PAY FOR THE VERY BASICS LIKE FOOD, HOUSING, MEDICAL CARE, AND HEATING?: VERY HARD
WITHIN THE LAST YEAR, HAVE TO BEEN RAPED OR FORCED TO HAVE ANY KIND OF SEXUAL ACTIVITY BY YOUR PARTNER OR EX-PARTNER?: NO

## 2025-06-26 ASSESSMENT — LIFESTYLE VARIABLES
AUDIT-C TOTAL SCORE: 0
HOW OFTEN DO YOU HAVE A DRINK CONTAINING ALCOHOL: NEVER
HOW MANY STANDARD DRINKS CONTAINING ALCOHOL DO YOU HAVE ON A TYPICAL DAY: PATIENT DOES NOT DRINK
SKIP TO QUESTIONS 9-10: 1
HOW OFTEN DO YOU HAVE SIX OR MORE DRINKS ON ONE OCCASION: NEVER

## 2025-06-26 ASSESSMENT — PATIENT HEALTH QUESTIONNAIRE - PHQ9: CLINICAL INTERPRETATION OF PHQ2 SCORE: 0

## 2025-06-26 NOTE — PROGRESS NOTES
"Reason for Follow-Up:  Monthly outreach follow up    Current Health Status:  Stable hypertension, systemic lupus erythematosus, autoimmune hepatitis    Medical Updates:    No ER visits or hospitalizations since last monthly outreach     Medication Updates:    No orders to change medications since last monthly outreach     Symptoms:  Madelyn reports she is feeling \"pretty good.\"     Plan of Care Goals and Progress:    Goal 1. Education on management of Lupus     Progress:  Madelyn reports she is feeling \"pretty good.\" She states she has been feeling quite well. She has been able to do some house work      Barriers:  Age, progression of disease process, knowledge of healthcare, habits, multiple family and financial stressors     Interventions:  Reviewed next PCP follow up on 7/23     Education:  Discussed medications: Madelyn is currently not taking azathioprine as prescribed. She stopped it for a few days and resumed it at a lower dose. She is taking azathioprine 50mg one tablet twice a day (ordered as azathioprine 50mg 1.5 tablets twice a day)    Goal 2.  HTN Education      Progress: Madelyn does not check her blood pressure at home.       Barriers:  Age, progression of disease process, knowledge of healthcare, habits, multiple family and financial stressors     Interventions:  Reviewed next PCP follow up on 7/23     Education:  Declined conversation regarding hypertension.  However, did review her medication list and next PCP follow-up appointment      Patient's Concerns and Feedback (Self Management of Care):   Spoke with Madelyn for monthly outreach follow-up. She reports she is feeling \"pretty good.\" She states she has been feeling quite well. She has been able to do some house work.  Reviewed education on management of lupus and hypertension as above.  She denies any needs at this time.  Encouraged to call with any questions or concerns. Annual Case Review completed, patient continues to qualify and would benefit from PCM " program. Updated Social Determinates of Health, reviewed medication list, and reviewed healthcare goals.     Next Steps:     Follow-Up Plan:  July 2025      Appointments:  7/23 PCP      Contact Information:  Call [phone] with any questions or concerns.

## 2025-06-27 DIAGNOSIS — E78.2 MIXED HYPERLIPIDEMIA: ICD-10-CM

## 2025-06-27 RX ORDER — ATORVASTATIN CALCIUM 20 MG/1
20 TABLET, FILM COATED ORAL
Qty: 100 TABLET | Refills: 3 | Status: SHIPPED | OUTPATIENT
Start: 2025-06-27

## 2025-07-05 DIAGNOSIS — K29.30 CHRONIC SUPERFICIAL GASTRITIS WITHOUT BLEEDING: ICD-10-CM

## 2025-07-05 DIAGNOSIS — K22.70 BARRETT'S ESOPHAGUS WITHOUT DYSPLASIA: ICD-10-CM

## 2025-07-05 DIAGNOSIS — R10.13 EPIGASTRIC ABDOMINAL PAIN: ICD-10-CM

## 2025-07-07 RX ORDER — PANTOPRAZOLE SODIUM 40 MG/1
TABLET, DELAYED RELEASE ORAL
Qty: 90 TABLET | Refills: 1 | Status: SHIPPED | OUTPATIENT
Start: 2025-07-07

## 2025-07-16 ENCOUNTER — OFFICE VISIT (OUTPATIENT)
Dept: MEDICAL GROUP | Facility: MEDICAL CENTER | Age: 75
End: 2025-07-16
Payer: MEDICARE

## 2025-07-16 VITALS
HEART RATE: 80 BPM | RESPIRATION RATE: 15 BRPM | BODY MASS INDEX: 22.11 KG/M2 | OXYGEN SATURATION: 99 % | HEIGHT: 62 IN | SYSTOLIC BLOOD PRESSURE: 142 MMHG | DIASTOLIC BLOOD PRESSURE: 68 MMHG | WEIGHT: 120.15 LBS | TEMPERATURE: 98.8 F

## 2025-07-16 DIAGNOSIS — M32.19 SYSTEMIC LUPUS ERYTHEMATOSUS (SLE) WITH MULTISYSTEM INVOLVEMENT (HCC): ICD-10-CM

## 2025-07-16 DIAGNOSIS — E55.9 VITAMIN D DEFICIENCY: ICD-10-CM

## 2025-07-16 DIAGNOSIS — F43.23 SITUATIONAL MIXED ANXIETY AND DEPRESSIVE DISORDER: ICD-10-CM

## 2025-07-16 DIAGNOSIS — R10.13 EPIGASTRIC ABDOMINAL PAIN: ICD-10-CM

## 2025-07-16 DIAGNOSIS — K22.70 BARRETT'S ESOPHAGUS WITHOUT DYSPLASIA: ICD-10-CM

## 2025-07-16 DIAGNOSIS — M35.9 AUTOIMMUNE DISEASE (HCC): ICD-10-CM

## 2025-07-16 DIAGNOSIS — Z78.0 POSTMENOPAUSAL STATUS: ICD-10-CM

## 2025-07-16 DIAGNOSIS — K75.4 CHRONIC AUTOIMMUNE HEPATITIS (HCC): ICD-10-CM

## 2025-07-16 DIAGNOSIS — F41.8 SITUATIONAL ANXIETY: ICD-10-CM

## 2025-07-16 DIAGNOSIS — F51.01 PRIMARY INSOMNIA: ICD-10-CM

## 2025-07-16 DIAGNOSIS — K74.60 CIRRHOSIS, NON-ALCOHOLIC (HCC): ICD-10-CM

## 2025-07-16 DIAGNOSIS — Z12.31 ENCOUNTER FOR SCREENING MAMMOGRAM FOR BREAST CANCER: ICD-10-CM

## 2025-07-16 PROCEDURE — 3077F SYST BP >= 140 MM HG: CPT | Performed by: FAMILY MEDICINE

## 2025-07-16 PROCEDURE — 99214 OFFICE O/P EST MOD 30 MIN: CPT | Performed by: FAMILY MEDICINE

## 2025-07-16 PROCEDURE — 3078F DIAST BP <80 MM HG: CPT | Performed by: FAMILY MEDICINE

## 2025-07-16 RX ORDER — AZATHIOPRINE 50 MG/1
50 TABLET ORAL 2 TIMES DAILY
Qty: 200 TABLET | Refills: 3 | Status: SHIPPED | OUTPATIENT
Start: 2025-07-16

## 2025-07-16 RX ORDER — HYDROXYCHLOROQUINE SULFATE 200 MG/1
200 TABLET, FILM COATED ORAL DAILY
Qty: 100 TABLET | Refills: 3 | Status: SHIPPED | OUTPATIENT
Start: 2025-07-16

## 2025-07-16 RX ORDER — ALPRAZOLAM 0.5 MG
0.5 TABLET ORAL 3 TIMES DAILY PRN
Qty: 90 TABLET | Refills: 5 | Status: SHIPPED | OUTPATIENT
Start: 2025-07-16 | End: 2026-01-12

## 2025-07-16 RX ORDER — SUCRALFATE 1 G/1
1 TABLET ORAL
Qty: 100 TABLET | Refills: 3 | Status: SHIPPED | OUTPATIENT
Start: 2025-07-16

## 2025-07-16 ASSESSMENT — FIBROSIS 4 INDEX: FIB4 SCORE: 1.82

## 2025-07-16 NOTE — PROGRESS NOTES
Chief Complaint   Patient presents with    Hepatic Disease    Abdominal Pain       Subjective:     HPI:   Karen Dimas presents today with the followin. Systemic lupus erythematosus (SLE) with multisystem involvement (HCC)/Autoimmune disease (HCC)  Patient was diagnosed by rheumatology with lupus.  She has significantly high JARRETT.  She is on hydroxychloroquine and Imuran for this.  Liver enzymes are controlled.  Patient still has fatigue but has not had further overt lupus manifestations since beginning that regimen from rheumatology.  It is very difficult for her to get to the rheumatologist who is a significant distance from her.  She takes care of her great-grandchildren and travel to the rheumatologist is very difficult.  Since she has been stable from my point of view and also GI I am continuing her medication regimen and I am continuing to check blood tests every 6 months.  Orders placed again and discussed today.    2. Chronic autoimmune hepatitis (HCC)  Patient has chronic autoimmune hepatitis for which she is on the above immunosuppressive regimen.  Her liver transaminases have been in the normal range now for quite a while.  Follow-up orders discussed and placed.    3. Epigastric abdominal pain  Patient has persistent epigastric discomfort despite famotidine twice daily and pantoprazole once daily.  She had an EGD in February which showed continued esophagitis but no ulceration, stricture.  No gastric or duodenal ulcers appreciated.  We will try Carafate 3 times daily.  She has increased discomfort after she eats so she will take the Carafate with meals.  Follow-up orders discussed and placed.    4. Vitamin D deficiency  Most recent labs in January did show mild vitamin D deficiency.  I have asked her to take vitamin D 2000 units once a day with a meal.  Follow-up order discussed and placed.    6. Gómez's esophagus without dysplasia/Cirrhosis, non-alcoholic (HCC)  Patient does have  cirrhosis but there were no varices on endoscopy in February.  Patient did have an episode of dark black stool a few months ago and occasionally some bright red blood.  Follow-up lab orders discussed and placed.    7. Primary insomnia  Patient does have primary insomnia.  She states the alprazolam helps her to get to sleep.  She has about 4 to 5 hours of sleep on this regimen.  That is the most she could possibly get.  When she wakes up early she gets up and does gardening and household chores.  Without the alprazolam she does not sleep at all.  She has tried numerous over-the-counter sleep treatments with no benefit.  PDMP review shows no inconsistencies.  Renewal discussed and placed.  Controlled substance treatment agreement reviewed and signed.    8. Situational mixed anxiety and depressive disorder/;Situational anxiety  Patient's daughter is now finally starting her 10-year incarceration.  Patient's son is being very helpful.  Madelyn does have custody and responsibility for her great-grandchildren.  The eldest just became able to drive.  She will no longer have to take him to and from school in her car.  Apparently the younger ones go to school nearby.  She still has a lot of anxiety worrying about them and worrying what would happen to them if she were to die soon.  There have been no adverse events reported or observed for the alprazolam medication.  She is still on sertraline for depression which helps a little bit.  She cannot tolerate high-dose sertraline.  Alprazolam renewed.    9. Encounter for screening mammogram for breast cancer  Mammogram order discussed and placed    10. Postmenopausal status  Bone density order discussed and placed        Patient Active Problem List    Diagnosis Date Noted    Moderate episode of recurrent major depressive disorder (HCC) 01/22/2025    Ocular migraine 05/23/2024    Systemic lupus erythematosus (SLE) with multisystem involvement (HCC) 12/14/2023    Situational mixed  "anxiety and depressive disorder 12/14/2023    Arthritis of foot, right 06/14/2023    Earlobe lesion, left 06/14/2023    Posterior neck pain 12/16/2022    Itching 12/16/2022    Cirrhosis, non-alcoholic (HCC) 07/29/2022    Left ventricular hypokinesis 07/29/2022    Gómez's esophagus without dysplasia 07/29/2022    Cutaneous horn 07/29/2022    Chronic autoimmune hepatitis (HCC) 05/16/2022    Ds DNA antibody positive 05/16/2022    Elevated sedimentation rate 04/20/2022    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    Autoimmune disease (HCC) 04/20/2022    Epigastric abdominal pain 04/20/2022    Aortic atherosclerosis (HCC) 03/01/2022    Myofascial pain on left side 11/30/2021    Muscle tenderness 11/30/2021    History of 2019 novel coronavirus disease (COVID-19) 11/11/2021    Bilateral radicular pain 01/20/2021    Muscle spasm 01/20/2021    Mixed hyperlipidemia 01/20/2021    Vitamin D deficiency 01/20/2021    Intervertebral cervical disc disorder with myelopathy, cervical region 11/29/2017    Hyperglobulinemia 12/19/2016    Osteoporosis 12/18/2016    Weakness 07/31/2015    Stress-induced cardiomyopathy 09/30/2014    Situational anxiety 09/29/2014    Microscopic colitis 09/28/2014    Essential hypertension 09/26/2014    Insomnia 09/26/2014    Gastritis 09/25/2014       Current medicines (including changes today)  Current Medications[1]    Allergies[2]    ROS: As per HPI denies chest pain or shortness of breath.       Objective:     BP (!) 142/68   Pulse 80   Temp 37.1 °C (98.8 °F) (Temporal)   Resp 15   Ht 1.575 m (5' 2\")   Wt 54.5 kg (120 lb 2.4 oz)   SpO2 99%  Body mass index is 21.98 kg/m².    Physical Exam:  Constitutional: Well-developed and well-nourished. Not diaphoretic. No distress. Lucid and fluent.  Skin: Skin is warm and dry. No rash noted.  Head: Atraumatic without lesions.  Eyes: Conjunctivae and extraocular motions are normal. Pupils are equal, round, and reactive to light. No scleral icterus.   Ears:  External ears " unremarkable.   Neck: Supple, trachea midline. No thyromegaly present. No cervical or supraclavicular lymphadenopathy. No JVD or carotid bruits appreciated  Cardiovascular: Regular rate and rhythm.  Normal S1, S2 without murmur appreciated.  Chest: Effort normal. Clear to auscultation throughout. No adventitious sounds.   Abdomen: Soft, non tender, and without distention. Active bowel sounds in all four quadrants. No rebound, guarding, masses or hepatosplenomegaly.  Extremities: No cyanosis, clubbing, erythema, nor edema.   Neurological: Alert and oriented x 3. No tremor appreciated.  Psychiatric:  Behavior, mood, and affect are appropriate.       Assessment and Plan:     74 y.o. female with the following issues:    1. Systemic lupus erythematosus (SLE) with multisystem involvement (HCC)  hydroxychloroquine (PLAQUENIL) 200 MG Tab    azaTHIOprine (IMURAN) 50 MG Tab    Comp Metabolic Panel    CBC WITHOUT DIFFERENTIAL      2. Autoimmune disease (HCC)  hydroxychloroquine (PLAQUENIL) 200 MG Tab    azaTHIOprine (IMURAN) 50 MG Tab    Comp Metabolic Panel    CBC WITHOUT DIFFERENTIAL    TSH    VITAMIN D,25 HYDROXY (DEFICIENCY)      3. Chronic autoimmune hepatitis (HCC)  hydroxychloroquine (PLAQUENIL) 200 MG Tab    azaTHIOprine (IMURAN) 50 MG Tab      4. Epigastric abdominal pain  sucralfate (CARAFATE) 1 g Tab      5. Vitamin D deficiency        6. Gómez's esophagus without dysplasia  Comp Metabolic Panel    CBC WITHOUT DIFFERENTIAL      7. Cirrhosis, non-alcoholic (HCC)  Comp Metabolic Panel    CBC WITHOUT DIFFERENTIAL    TSH    VITAMIN D,25 HYDROXY (DEFICIENCY)      8. Primary insomnia  ALPRAZolam (XANAX) 0.5 MG Tab    Controlled Substance Treatment Agreement      9. Situational mixed anxiety and depressive disorder  ALPRAZolam (XANAX) 0.5 MG Tab    Controlled Substance Treatment Agreement      10. Situational anxiety  ALPRAZolam (XANAX) 0.5 MG Tab    Controlled Substance Treatment Agreement      11. Encounter for  screening mammogram for breast cancer  MA-SCREENING MAMMO BILAT W/TOMOSYNTHESIS W/CAD      12. Postmenopausal status  DS-BONE DENSITY STUDY (DEXA)            Followup: Return in about 6 months (around 1/16/2026), or if symptoms worsen or fail to improve.         [1]   Current Outpatient Medications   Medication Sig Dispense Refill    hydroxychloroquine (PLAQUENIL) 200 MG Tab Take 1 Tablet by mouth every day. 100 Tablet 3    sucralfate (CARAFATE) 1 g Tab Take 1 Tablet by mouth 3 times a day before meals. 100 Tablet 3    ALPRAZolam (XANAX) 0.5 MG Tab Take 1 Tablet by mouth 3 times a day as needed for Sleep or Anxiety for up to 180 days. 90 tablets is a 30 day quantity  Indications: Feeling Anxious 90 Tablet 5    azaTHIOprine (IMURAN) 50 MG Tab Take 1 Tablet by mouth 2 times a day. 200 Tablet 3    pantoprazole (PROTONIX) 40 MG Tablet Delayed Response TAKE 1 TABLET BY MOUTH EVERY DAY. CONTINUE WITH FAMOTIDINE AS WELL. 90 Tablet 1    atorvastatin (LIPITOR) 20 MG Tab TAKE 1 TABLET BY MOUTH EVERYDAY AT BEDTIME 100 Tablet 3    amLODIPine (NORVASC) 5 MG Tab TAKE 1 TABLET BY MOUTH EVERY  Tablet 3    famotidine (PEPCID) 20 MG Tab Take 1 Tablet by mouth 2 times a day. 180 Tablet 2    losartan (COZAAR) 100 MG Tab TAKE 1 TABLET BY MOUTH EVERY  Tablet 3    sertraline (ZOLOFT) 25 MG tablet TAKE 1 TABLET BY MOUTH EVERYDAY AT BEDTIME 90 Tablet 3     No current facility-administered medications for this visit.   [2]   Allergies  Allergen Reactions    Codeine Vomiting     [id number]    Cefdinir      Rash    Oxycodone Vomiting    Tramadol Vomiting     itching

## 2025-07-28 ENCOUNTER — HOSPITAL ENCOUNTER (OUTPATIENT)
Dept: LAB | Facility: MEDICAL CENTER | Age: 75
End: 2025-07-28
Attending: FAMILY MEDICINE
Payer: MEDICARE

## 2025-07-28 DIAGNOSIS — M35.9 AUTOIMMUNE DISEASE (HCC): ICD-10-CM

## 2025-07-28 DIAGNOSIS — M32.19 SYSTEMIC LUPUS ERYTHEMATOSUS (SLE) WITH MULTISYSTEM INVOLVEMENT (HCC): ICD-10-CM

## 2025-07-28 DIAGNOSIS — K74.60 CIRRHOSIS, NON-ALCOHOLIC (HCC): ICD-10-CM

## 2025-07-28 DIAGNOSIS — K22.70 BARRETT'S ESOPHAGUS WITHOUT DYSPLASIA: ICD-10-CM

## 2025-07-28 LAB
25(OH)D3 SERPL-MCNC: 31 NG/ML (ref 30–100)
ALBUMIN SERPL BCP-MCNC: 4.6 G/DL (ref 3.2–4.9)
ALBUMIN/GLOB SERPL: 1.3 G/DL
ALP SERPL-CCNC: 70 U/L (ref 30–99)
ALT SERPL-CCNC: 8 U/L (ref 2–50)
ANION GAP SERPL CALC-SCNC: 15 MMOL/L (ref 7–16)
AST SERPL-CCNC: 24 U/L (ref 12–45)
BILIRUB SERPL-MCNC: 0.8 MG/DL (ref 0.1–1.5)
BUN SERPL-MCNC: 8 MG/DL (ref 8–22)
CALCIUM ALBUM COR SERPL-MCNC: 9.5 MG/DL (ref 8.5–10.5)
CALCIUM SERPL-MCNC: 10 MG/DL (ref 8.5–10.5)
CHLORIDE SERPL-SCNC: 106 MMOL/L (ref 96–112)
CO2 SERPL-SCNC: 19 MMOL/L (ref 20–33)
CREAT SERPL-MCNC: 0.86 MG/DL (ref 0.5–1.4)
ERYTHROCYTE [DISTWIDTH] IN BLOOD BY AUTOMATED COUNT: 48.9 FL (ref 35.9–50)
GFR SERPLBLD CREATININE-BSD FMLA CKD-EPI: 71 ML/MIN/1.73 M 2
GLOBULIN SER CALC-MCNC: 3.5 G/DL (ref 1.9–3.5)
GLUCOSE SERPL-MCNC: 87 MG/DL (ref 65–99)
HCT VFR BLD AUTO: 45.2 % (ref 37–47)
HGB BLD-MCNC: 15.7 G/DL (ref 12–16)
MCH RBC QN AUTO: 33.5 PG (ref 27–33)
MCHC RBC AUTO-ENTMCNC: 34.7 G/DL (ref 32.2–35.5)
MCV RBC AUTO: 96.6 FL (ref 81.4–97.8)
PLATELET # BLD AUTO: 284 K/UL (ref 164–446)
PMV BLD AUTO: 9.9 FL (ref 9–12.9)
POTASSIUM SERPL-SCNC: 3.9 MMOL/L (ref 3.6–5.5)
PROT SERPL-MCNC: 8.1 G/DL (ref 6–8.2)
RBC # BLD AUTO: 4.68 M/UL (ref 4.2–5.4)
SODIUM SERPL-SCNC: 140 MMOL/L (ref 135–145)
TSH SERPL-ACNC: 2.24 UIU/ML (ref 0.38–5.33)
WBC # BLD AUTO: 6.4 K/UL (ref 4.8–10.8)

## 2025-07-28 PROCEDURE — 80053 COMPREHEN METABOLIC PANEL: CPT

## 2025-07-28 PROCEDURE — 84443 ASSAY THYROID STIM HORMONE: CPT

## 2025-07-28 PROCEDURE — 85027 COMPLETE CBC AUTOMATED: CPT

## 2025-07-28 PROCEDURE — 82306 VITAMIN D 25 HYDROXY: CPT

## 2025-07-28 PROCEDURE — 36415 COLL VENOUS BLD VENIPUNCTURE: CPT

## 2025-08-01 ENCOUNTER — RESULTS FOLLOW-UP (OUTPATIENT)
Dept: MEDICAL GROUP | Facility: MEDICAL CENTER | Age: 75
End: 2025-08-01
Payer: MEDICARE

## 2025-08-11 ENCOUNTER — PATIENT OUTREACH (OUTPATIENT)
Dept: HEALTH INFORMATION MANAGEMENT | Facility: OTHER | Age: 75
End: 2025-08-11
Payer: MEDICARE

## 2025-08-11 DIAGNOSIS — I10 ESSENTIAL HYPERTENSION: Primary | ICD-10-CM

## 2025-08-11 DIAGNOSIS — K75.4 CHRONIC AUTOIMMUNE HEPATITIS (HCC): ICD-10-CM

## 2025-08-11 DIAGNOSIS — K74.60 CIRRHOSIS, NON-ALCOHOLIC (HCC): ICD-10-CM

## 2025-08-11 DIAGNOSIS — M32.19 SYSTEMIC LUPUS ERYTHEMATOSUS (SLE) WITH MULTISYSTEM INVOLVEMENT (HCC): ICD-10-CM

## (undated) DEVICE — MASK ANESTHESIA ADULT  - (100/CA)

## (undated) DEVICE — CHLORAPREP 26 ML APPLICATOR - ORANGE TINT(25/CA)

## (undated) DEVICE — SYRINGE SAFETY 10 ML 18 GA X 1 1/2 BLUNT LL (100/BX 4BX/CA)

## (undated) DEVICE — GLOVE BIOGEL PI INDICATOR SZ 7.0 SURGICAL PF LF - (50/BX 4BX/CA)

## (undated) DEVICE — ELECTRODE 850 FOAM ADHESIVE - HYDROGEL RADIOTRNSPRNT (50/PK)

## (undated) DEVICE — TUBING CLEARLINK DUO-VENT - C-FLO (48EA/CA)

## (undated) DEVICE — DISSECT TOOL MIDAS REX

## (undated) DEVICE — TUBING C&T SET FLYING LEADS DRAIN TUBING (10EA/BX)

## (undated) DEVICE — SUTURE 4-0 VICRYL PLUS TF - 8 X 18 INCH (12/BX)

## (undated) DEVICE — SUTURE GENERAL

## (undated) DEVICE — TOOL DISSECT MATCH HEAD

## (undated) DEVICE — TUBE EMG NIM TRIVANTAGE 7MM (3EA/PK)

## (undated) DEVICE — GLOVE BIOGEL PI ORTHO SZ 7.5 PF LF (40PR/BX)

## (undated) DEVICE — SET LEADWIRE 5 LEAD BEDSIDE DISPOSABLE ECG (1SET OF 5/EA)

## (undated) DEVICE — CANISTER SUCTION 3000ML MECHANICAL FILTER AUTO SHUTOFF MEDI-VAC NONSTERILE LF DISP  (40EA/CA)

## (undated) DEVICE — NEEDLE SPINAL NON-SAFETY 18 GA X 3 IN (25EA/BX)

## (undated) DEVICE — HEAD HOLDER JUNIOR/ADULT

## (undated) DEVICE — SODIUM CHL IRRIGATION 0.9% 1000ML (12EA/CA)

## (undated) DEVICE — KIT ROOM DECONTAMINATION

## (undated) DEVICE — PROTECTOR ULNA NERVE - (36PR/CA)

## (undated) DEVICE — SUCTION INSTRUMENT YANKAUER BULBOUS TIP W/O VENT (50EA/CA)

## (undated) DEVICE — LACTATED RINGERS INJ 1000 ML - (14EA/CA 60CA/PF)

## (undated) DEVICE — KIT SURGIFLO W/OUT THROMBIN - (6EA/CA)

## (undated) DEVICE — ELECTRODE DUAL RETURN W/ CORD - (50/PK)

## (undated) DEVICE — SPONGE PEANUT - (5/PK 50PK/CA)

## (undated) DEVICE — NEPTUNE 4 PORT MANIFOLD - (20/PK)

## (undated) DEVICE — BIT DRILL 11MM

## (undated) DEVICE — GOWN SURGEONS LARGE - (32/CA)

## (undated) DEVICE — INTRAOP NEURO IN OR 1:1 PER 15 MIN

## (undated) DEVICE — NEEDLE NON SAFETY HYPO 22 GA X 1 1/2 IN (100/BX)

## (undated) DEVICE — SYRINGE SAFETY 3 ML 18 GA X 1 1/2 BLUNT LL (100/BX 8BX/CA)

## (undated) DEVICE — GOWN SURGICAL XX-LARGE - (28EA/CA) SIRUS NON REINFORCED

## (undated) DEVICE — CLOSURE SKIN STRIP 1/2 X 4 IN - (STERI STRIP) (50/BX 4BX/CA)

## (undated) DEVICE — PACK NEURO - (2EA/CA)

## (undated) DEVICE — MIDAS LUBRICATOR DIFFUSER PACK (4EA/CA)

## (undated) DEVICE — GOWN WARMING STANDARD FLEX - (30/CA)

## (undated) DEVICE — SCREW DISTRACTION 14MM YELLOW - STERILE (10EA/BX) (5TX4=20)

## (undated) DEVICE — GLOVE BIOGEL PI INDICATOR SZ 8.0 SURGICAL PF LF -(50/BX 4BX/CA)

## (undated) DEVICE — PLATE PIN GOLDIN (2TX3=6)

## (undated) DEVICE — LACTATED RINGERS INJ. 500 ML - (24EA/CA)

## (undated) DEVICE — HEMOSTAT SURG ABSORBABLE - 2 X 3 IN SURGICEL (24EA/CA)

## (undated) DEVICE — GLOVE BIOGEL SZ 8 SURGICAL PF LTX - (50PR/BX 4BX/CA)

## (undated) DEVICE — SHEET PEDIATRIC LAPAROTOMY - (10/CA)

## (undated) DEVICE — TRAY CATHETER FOLEY URINE METER W/STATLOCK 350ML (10EA/CA)

## (undated) DEVICE — GLOVE BIOGEL ECLIPSE  PF LATEX SIZE 6.5 (50PR/BX)

## (undated) DEVICE — CLIP SM INTNL HRZN TI ESCP LGT - (24EA/PK 25PK/BX)

## (undated) DEVICE — SET EXTENSION WITH 2 PORTS (48EA/CA) ***PART #2C8610 IS A SUBSTITUTE*****

## (undated) DEVICE — GLOVE SZ 6.5 BIOGEL PI MICRO - PF LF (50PR/BX)

## (undated) DEVICE — SUTURE 3-0 VICRYL PLUS RB-1 - 8 X 18 INCH (12/BX)

## (undated) DEVICE — RESTRAINTS LIMB DISP. - (12/BX 4BX/CA)

## (undated) DEVICE — KIT ANESTHESIA W/CIRCUIT & 3/LT BAG W/FILTER (20EA/CA)

## (undated) DEVICE — SLEEVE, VASO, THIGH, MED

## (undated) DEVICE — Device

## (undated) DEVICE — BOVIE BLADE COATED &INSULATED - 25/PK

## (undated) DEVICE — CLIP MED INTNL HRZN TI ESCP - (25/BX)

## (undated) DEVICE — SENSOR SPO2 NEO LNCS ADHESIVE (20/BX) SEE USER NOTES